# Patient Record
Sex: FEMALE | Race: OTHER | Employment: UNEMPLOYED | URBAN - METROPOLITAN AREA
[De-identification: names, ages, dates, MRNs, and addresses within clinical notes are randomized per-mention and may not be internally consistent; named-entity substitution may affect disease eponyms.]

---

## 2019-05-10 ENCOUNTER — HOSPITAL ENCOUNTER (OUTPATIENT)
Age: 34
Setting detail: SPECIMEN
Discharge: HOME | End: 2019-05-10

## 2019-05-11 LAB
ABSOLUTE EOS #: 0.13 K/UL (ref 0–0.44)
ABSOLUTE IMMATURE GRANULOCYTE: <0.03 K/UL (ref 0–0.3)
ABSOLUTE LYMPH #: 3.38 K/UL (ref 1.1–3.7)
ABSOLUTE MONO #: 0.66 K/UL (ref 0.1–1.2)
ALBUMIN SERPL-MCNC: 4.2 G/DL (ref 3.5–5.2)
ALBUMIN/GLOBULIN RATIO: 1.1 (ref 1–2.5)
ALP BLD-CCNC: 61 U/L (ref 35–104)
ALT SERPL-CCNC: 21 U/L (ref 5–33)
ANION GAP SERPL CALCULATED.3IONS-SCNC: 12 MMOL/L (ref 9–17)
AST SERPL-CCNC: 24 U/L
BASOPHILS # BLD: 1 % (ref 0–2)
BASOPHILS ABSOLUTE: 0.06 K/UL (ref 0–0.2)
BILIRUB SERPL-MCNC: 0.2 MG/DL (ref 0.3–1.2)
BUN BLDV-MCNC: 15 MG/DL (ref 6–20)
BUN/CREAT BLD: ABNORMAL (ref 9–20)
CALCIUM SERPL-MCNC: 9.3 MG/DL (ref 8.6–10.4)
CHLORIDE BLD-SCNC: 100 MMOL/L (ref 98–107)
CHOLESTEROL/HDL RATIO: 4.7
CHOLESTEROL: 178 MG/DL
CO2: 25 MMOL/L (ref 20–31)
CREAT SERPL-MCNC: 0.63 MG/DL (ref 0.5–0.9)
DIFFERENTIAL TYPE: NORMAL
EOSINOPHILS RELATIVE PERCENT: 1 % (ref 1–4)
GFR AFRICAN AMERICAN: >60 ML/MIN
GFR NON-AFRICAN AMERICAN: >60 ML/MIN
GFR SERPL CREATININE-BSD FRML MDRD: ABNORMAL ML/MIN/{1.73_M2}
GFR SERPL CREATININE-BSD FRML MDRD: ABNORMAL ML/MIN/{1.73_M2}
GLUCOSE BLD-MCNC: 88 MG/DL (ref 70–99)
HCT VFR BLD CALC: 41.9 % (ref 36.3–47.1)
HDLC SERPL-MCNC: 38 MG/DL
HEMOGLOBIN: 13.1 G/DL (ref 11.9–15.1)
IMMATURE GRANULOCYTES: 0 %
LDL CHOLESTEROL: 118 MG/DL (ref 0–130)
LYMPHOCYTES # BLD: 31 % (ref 24–43)
MCH RBC QN AUTO: 28.7 PG (ref 25.2–33.5)
MCHC RBC AUTO-ENTMCNC: 31.3 G/DL (ref 28.4–34.8)
MCV RBC AUTO: 91.7 FL (ref 82.6–102.9)
MONOCYTES # BLD: 6 % (ref 3–12)
NRBC AUTOMATED: 0 PER 100 WBC
PDW BLD-RTO: 12.8 % (ref 11.8–14.4)
PLATELET # BLD: 343 K/UL (ref 138–453)
PLATELET ESTIMATE: NORMAL
PMV BLD AUTO: 10.6 FL (ref 8.1–13.5)
POTASSIUM SERPL-SCNC: 4.3 MMOL/L (ref 3.7–5.3)
RBC # BLD: 4.57 M/UL (ref 3.95–5.11)
RBC # BLD: NORMAL 10*6/UL
SEG NEUTROPHILS: 61 % (ref 36–65)
SEGMENTED NEUTROPHILS ABSOLUTE COUNT: 6.56 K/UL (ref 1.5–8.1)
SODIUM BLD-SCNC: 137 MMOL/L (ref 135–144)
TOTAL PROTEIN: 8 G/DL (ref 6.4–8.3)
TRIGL SERPL-MCNC: 111 MG/DL
TSH SERPL DL<=0.05 MIU/L-ACNC: 2.04 MIU/L (ref 0.3–5)
VLDLC SERPL CALC-MCNC: ABNORMAL MG/DL (ref 1–30)
WBC # BLD: 10.8 K/UL (ref 3.5–11.3)
WBC # BLD: NORMAL 10*3/UL

## 2019-05-17 ENCOUNTER — HOSPITAL ENCOUNTER (OUTPATIENT)
Age: 34
Setting detail: SPECIMEN
Discharge: HOME | End: 2019-05-17
Payer: COMMERCIAL

## 2019-05-21 LAB
HPV SAMPLE: NORMAL
HPV, GENOTYPE 16: NOT DETECTED
HPV, GENOTYPE 18: NOT DETECTED
HPV, HIGH RISK OTHER: NOT DETECTED
HPV, INTERPRETATION: NORMAL
SPECIMEN DESCRIPTION: NORMAL

## 2019-05-22 LAB — CYTOLOGY REPORT: NORMAL

## 2019-09-08 ENCOUNTER — HOSPITAL ENCOUNTER (EMERGENCY)
Age: 34
Discharge: HOME | End: 2019-09-08
Attending: EMERGENCY MEDICINE
Payer: COMMERCIAL

## 2019-09-08 VITALS
RESPIRATION RATE: 18 BRPM | OXYGEN SATURATION: 94 % | TEMPERATURE: 103.2 F | SYSTOLIC BLOOD PRESSURE: 119 MMHG | DIASTOLIC BLOOD PRESSURE: 65 MMHG | HEART RATE: 139 BPM

## 2019-09-08 DIAGNOSIS — A09 ACUTE INFECTIVE GASTROENTERITIS: Primary | ICD-10-CM

## 2019-09-08 PROCEDURE — 99203 OFFICE O/P NEW LOW 30 MIN: CPT | Performed by: EMERGENCY MEDICINE

## 2019-09-08 PROCEDURE — 6370000000 HC RX 637 (ALT 250 FOR IP): Performed by: NURSE PRACTITIONER

## 2019-09-08 PROCEDURE — 99204 OFFICE O/P NEW MOD 45 MIN: CPT

## 2019-09-08 RX ORDER — LISINOPRIL 20 MG/1
20 TABLET ORAL 2 TIMES DAILY
COMMUNITY
End: 2020-02-12

## 2019-09-08 RX ORDER — ONDANSETRON 4 MG/1
4 TABLET, ORALLY DISINTEGRATING ORAL 4 TIMES DAILY PRN
Qty: 12 TABLET | Refills: 0 | Status: SHIPPED | OUTPATIENT
Start: 2019-09-08 | End: 2020-02-12 | Stop reason: ALTCHOICE

## 2019-09-08 RX ORDER — LOPERAMIDE HYDROCHLORIDE 2 MG/1
4 CAPSULE ORAL PRN
COMMUNITY
End: 2020-02-12 | Stop reason: ALTCHOICE

## 2019-09-08 RX ORDER — ONDANSETRON 4 MG/1
4 TABLET, ORALLY DISINTEGRATING ORAL ONCE
Status: COMPLETED | OUTPATIENT
Start: 2019-09-08 | End: 2019-09-08

## 2019-09-08 RX ORDER — ACETAMINOPHEN 325 MG/1
650 TABLET ORAL ONCE
Status: COMPLETED | OUTPATIENT
Start: 2019-09-08 | End: 2019-09-08

## 2019-09-08 RX ADMIN — ONDANSETRON 4 MG: 4 TABLET, ORALLY DISINTEGRATING ORAL at 14:33

## 2019-09-08 RX ADMIN — ACETAMINOPHEN 650 MG: 325 TABLET ORAL at 14:48

## 2019-09-08 SDOH — HEALTH STABILITY: MENTAL HEALTH: HOW OFTEN DO YOU HAVE A DRINK CONTAINING ALCOHOL?: NEVER

## 2019-09-08 ASSESSMENT — ENCOUNTER SYMPTOMS
BACK PAIN: 0
ABDOMINAL DISTENTION: 0
ABDOMINAL PAIN: 1
EYE REDNESS: 0
SHORTNESS OF BREATH: 0
COUGH: 0
EYE DISCHARGE: 0
WHEEZING: 0
CONSTIPATION: 0
SINUS PRESSURE: 0
STRIDOR: 0
CHOKING: 0
TROUBLE SWALLOWING: 0
FACIAL SWELLING: 0
DIARRHEA: 1
VOICE CHANGE: 0
BLOOD IN STOOL: 0
SORE THROAT: 0
NAUSEA: 1
VOMITING: 1
EYE PAIN: 0

## 2019-09-08 ASSESSMENT — PAIN DESCRIPTION - ORIENTATION: ORIENTATION: UPPER;MID

## 2019-09-08 ASSESSMENT — PAIN DESCRIPTION - LOCATION: LOCATION: ABDOMEN

## 2019-09-08 ASSESSMENT — PAIN - FUNCTIONAL ASSESSMENT: PAIN_FUNCTIONAL_ASSESSMENT: PREVENTS OR INTERFERES WITH MANY ACTIVE NOT PASSIVE ACTIVITIES

## 2019-09-08 ASSESSMENT — PAIN DESCRIPTION - PAIN TYPE: TYPE: ACUTE PAIN

## 2019-09-08 ASSESSMENT — PAIN DESCRIPTION - PROGRESSION: CLINICAL_PROGRESSION: NOT CHANGED

## 2019-09-08 ASSESSMENT — PAIN SCALES - GENERAL
PAINLEVEL_OUTOF10: 2
PAINLEVEL_OUTOF10: 2

## 2019-09-08 ASSESSMENT — PAIN DESCRIPTION - ONSET: ONSET: SUDDEN

## 2019-09-08 ASSESSMENT — PAIN DESCRIPTION - FREQUENCY: FREQUENCY: INTERMITTENT

## 2019-09-08 ASSESSMENT — PAIN DESCRIPTION - DESCRIPTORS: DESCRIPTORS: DISCOMFORT

## 2019-09-08 NOTE — ED PROVIDER NOTES
erythema or tonsillar abscesses. Eyes: Pupils are equal, round, and reactive to light. Conjunctivae and EOM are normal. Right eye exhibits no discharge. Left eye exhibits no discharge. No scleral icterus. Conjunctiva clear   Neck: Normal range of motion. No JVD present. No thyromegaly present. No meningismus   Cardiovascular: Regular rhythm, S1 normal, S2 normal, normal heart sounds, intact distal pulses and normal pulses. Tachycardia present. Exam reveals no gallop and no friction rub. No murmur heard. Pulmonary/Chest: Effort normal and breath sounds normal. No stridor. No respiratory distress. She has no wheezes. She has no rales. She exhibits no tenderness. No cough, lungs clear   Abdominal: Soft. Bowel sounds are normal. She exhibits no distension and no mass. There is tenderness in the epigastric area. There is no rigidity, no rebound, no guarding and no CVA tenderness. Slight epigastric tenderness. No right lower quadrant tenderness. Bowel sounds present. Musculoskeletal: Normal range of motion. She exhibits no edema or tenderness. Right lower leg: Normal.        Left lower leg: Normal.   Joints normal   Lymphadenopathy:     She has cervical adenopathy. Right cervical: Superficial cervical adenopathy present. No deep cervical and no posterior cervical adenopathy present. Left cervical: Superficial cervical adenopathy present. No deep cervical and no posterior cervical adenopathy present. Neurological: She is alert and oriented to person, place, and time. She has normal reflexes. She displays normal reflexes. No cranial nerve deficit. She exhibits normal muscle tone. Coordination normal.   Appropriate, no focal findings   Skin: Skin is warm and dry. No rash noted. She is not diaphoretic. No erythema. No rash or bruising   Psychiatric: She has a normal mood and affect.  Her behavior is normal. Judgment and thought content normal.   Nursing note and vitals

## 2019-11-18 ENCOUNTER — HOSPITAL ENCOUNTER (EMERGENCY)
Age: 34
Discharge: HOME | End: 2019-11-18
Payer: COMMERCIAL

## 2019-11-18 VITALS
TEMPERATURE: 97.7 F | WEIGHT: 252 LBS | HEIGHT: 64 IN | BODY MASS INDEX: 43.02 KG/M2 | SYSTOLIC BLOOD PRESSURE: 139 MMHG | OXYGEN SATURATION: 100 % | RESPIRATION RATE: 16 BRPM | DIASTOLIC BLOOD PRESSURE: 86 MMHG | HEART RATE: 84 BPM

## 2019-11-18 DIAGNOSIS — G56.01 CARPAL TUNNEL SYNDROME OF RIGHT WRIST: Primary | ICD-10-CM

## 2019-11-18 PROCEDURE — 99213 OFFICE O/P EST LOW 20 MIN: CPT | Performed by: NURSE PRACTITIONER

## 2019-11-18 PROCEDURE — 99212 OFFICE O/P EST SF 10 MIN: CPT

## 2019-11-18 ASSESSMENT — PAIN DESCRIPTION - PROGRESSION: CLINICAL_PROGRESSION: GRADUALLY WORSENING

## 2019-11-18 ASSESSMENT — PAIN DESCRIPTION - LOCATION: LOCATION: HAND

## 2019-11-18 ASSESSMENT — PAIN DESCRIPTION - ONSET: ONSET: GRADUAL

## 2019-11-18 ASSESSMENT — PAIN DESCRIPTION - ORIENTATION: ORIENTATION: RIGHT

## 2019-11-18 ASSESSMENT — PAIN DESCRIPTION - PAIN TYPE: TYPE: ACUTE PAIN

## 2019-11-18 ASSESSMENT — PAIN SCALES - GENERAL: PAINLEVEL_OUTOF10: 5

## 2019-11-18 ASSESSMENT — ENCOUNTER SYMPTOMS
NAUSEA: 0
VOMITING: 0

## 2019-11-18 ASSESSMENT — PAIN - FUNCTIONAL ASSESSMENT: PAIN_FUNCTIONAL_ASSESSMENT: PREVENTS OR INTERFERES SOME ACTIVE ACTIVITIES AND ADLS

## 2019-11-18 ASSESSMENT — PAIN DESCRIPTION - DESCRIPTORS: DESCRIPTORS: ACHING;SORE

## 2019-11-18 ASSESSMENT — PAIN DESCRIPTION - FREQUENCY: FREQUENCY: CONTINUOUS

## 2020-01-29 ENCOUNTER — HOSPITAL ENCOUNTER (EMERGENCY)
Age: 35
Discharge: HOME | End: 2020-01-29
Attending: EMERGENCY MEDICINE
Payer: COMMERCIAL

## 2020-01-29 ENCOUNTER — APPOINTMENT (OUTPATIENT)
Dept: GENERAL RADIOLOGY | Age: 35
End: 2020-01-29
Payer: COMMERCIAL

## 2020-01-29 VITALS
DIASTOLIC BLOOD PRESSURE: 74 MMHG | OXYGEN SATURATION: 97 % | TEMPERATURE: 99.2 F | WEIGHT: 280 LBS | HEART RATE: 86 BPM | HEIGHT: 64 IN | SYSTOLIC BLOOD PRESSURE: 151 MMHG | RESPIRATION RATE: 16 BRPM | BODY MASS INDEX: 47.8 KG/M2

## 2020-01-29 LAB
ANION GAP SERPL CALCULATED.3IONS-SCNC: 12 MEQ/L (ref 8–16)
BASOPHILS # BLD: 0.4 %
BASOPHILS ABSOLUTE: 0 THOU/MM3 (ref 0–0.1)
BUN BLDV-MCNC: 16 MG/DL (ref 7–22)
CALCIUM SERPL-MCNC: 8.9 MG/DL (ref 8.5–10.5)
CHLORIDE BLD-SCNC: 100 MEQ/L (ref 98–111)
CO2: 26 MEQ/L (ref 23–33)
CREAT SERPL-MCNC: 0.8 MG/DL (ref 0.4–1.2)
EKG ATRIAL RATE: 95 BPM
EKG P AXIS: 51 DEGREES
EKG P-R INTERVAL: 136 MS
EKG Q-T INTERVAL: 342 MS
EKG QRS DURATION: 80 MS
EKG QTC CALCULATION (BAZETT): 429 MS
EKG R AXIS: 30 DEGREES
EKG T AXIS: 4 DEGREES
EKG VENTRICULAR RATE: 95 BPM
EOSINOPHIL # BLD: 1 %
EOSINOPHILS ABSOLUTE: 0.1 THOU/MM3 (ref 0–0.4)
ERYTHROCYTE [DISTWIDTH] IN BLOOD BY AUTOMATED COUNT: 12.9 % (ref 11.5–14.5)
ERYTHROCYTE [DISTWIDTH] IN BLOOD BY AUTOMATED COUNT: 41.9 FL (ref 35–45)
GFR SERPL CREATININE-BSD FRML MDRD: 81 ML/MIN/1.73M2
GLUCOSE BLD-MCNC: 149 MG/DL (ref 70–108)
HCT VFR BLD CALC: 41.2 % (ref 37–47)
HEMOGLOBIN: 13.2 GM/DL (ref 12–16)
IMMATURE GRANS (ABS): 0.04 THOU/MM3 (ref 0–0.07)
IMMATURE GRANULOCYTES: 0.3 %
LYMPHOCYTES # BLD: 21.5 %
LYMPHOCYTES ABSOLUTE: 2.6 THOU/MM3 (ref 1–4.8)
MCH RBC QN AUTO: 28.5 PG (ref 26–33)
MCHC RBC AUTO-ENTMCNC: 32 GM/DL (ref 32.2–35.5)
MCV RBC AUTO: 89 FL (ref 81–99)
MONOCYTES # BLD: 3.9 %
MONOCYTES ABSOLUTE: 0.5 THOU/MM3 (ref 0.4–1.3)
NUCLEATED RED BLOOD CELLS: 0 /100 WBC
OSMOLALITY CALCULATION: 279.7 MOSMOL/KG (ref 275–300)
PLATELET # BLD: 316 THOU/MM3 (ref 130–400)
PMV BLD AUTO: 9.7 FL (ref 9.4–12.4)
POTASSIUM REFLEX MAGNESIUM: 3.8 MEQ/L (ref 3.5–5.2)
PREGNANCY, SERUM: NEGATIVE
PRO-BNP: 195.8 PG/ML (ref 0–450)
RBC # BLD: 4.63 MILL/MM3 (ref 4.2–5.4)
SEG NEUTROPHILS: 72.9 %
SEGMENTED NEUTROPHILS ABSOLUTE COUNT: 8.7 THOU/MM3 (ref 1.8–7.7)
SODIUM BLD-SCNC: 138 MEQ/L (ref 135–145)
TROPONIN T: < 0.01 NG/ML
TROPONIN T: < 0.01 NG/ML
WBC # BLD: 11.9 THOU/MM3 (ref 4.8–10.8)

## 2020-01-29 PROCEDURE — 71046 X-RAY EXAM CHEST 2 VIEWS: CPT

## 2020-01-29 PROCEDURE — 6370000000 HC RX 637 (ALT 250 FOR IP): Performed by: EMERGENCY MEDICINE

## 2020-01-29 PROCEDURE — 85025 COMPLETE CBC W/AUTO DIFF WBC: CPT

## 2020-01-29 PROCEDURE — 84703 CHORIONIC GONADOTROPIN ASSAY: CPT

## 2020-01-29 PROCEDURE — 84484 ASSAY OF TROPONIN QUANT: CPT

## 2020-01-29 PROCEDURE — 83880 ASSAY OF NATRIURETIC PEPTIDE: CPT

## 2020-01-29 PROCEDURE — 80048 BASIC METABOLIC PNL TOTAL CA: CPT

## 2020-01-29 PROCEDURE — 93005 ELECTROCARDIOGRAM TRACING: CPT | Performed by: EMERGENCY MEDICINE

## 2020-01-29 PROCEDURE — 36415 COLL VENOUS BLD VENIPUNCTURE: CPT

## 2020-01-29 PROCEDURE — 93010 ELECTROCARDIOGRAM REPORT: CPT | Performed by: NUCLEAR MEDICINE

## 2020-01-29 PROCEDURE — 99285 EMERGENCY DEPT VISIT HI MDM: CPT

## 2020-01-29 RX ORDER — ASPIRIN 81 MG/1
324 TABLET, CHEWABLE ORAL ONCE
Status: DISCONTINUED | OUTPATIENT
Start: 2020-01-29 | End: 2020-01-29 | Stop reason: HOSPADM

## 2020-01-29 RX ORDER — MECLIZINE HYDROCHLORIDE CHEWABLE TABLETS 25 MG/1
25 TABLET, CHEWABLE ORAL ONCE
Status: COMPLETED | OUTPATIENT
Start: 2020-01-29 | End: 2020-01-29

## 2020-01-29 RX ADMIN — MECLIZINE HCL 25 MG: 25 TABLET, CHEWABLE ORAL at 16:40

## 2020-01-29 ASSESSMENT — ENCOUNTER SYMPTOMS
BLOOD IN STOOL: 0
NAUSEA: 0
COUGH: 0
ABDOMINAL PAIN: 0
SINUS PAIN: 0
DIARRHEA: 0
CHEST TIGHTNESS: 1
SINUS PRESSURE: 0
CONSTIPATION: 0
BACK PAIN: 0
SHORTNESS OF BREATH: 0
RECTAL PAIN: 0
EYE PAIN: 0

## 2020-01-29 NOTE — ED PROVIDER NOTES
[facility] [address]        CHIEF COMPLAINT       Chief Complaint   Patient presents with    Chest Pain     History obtained from chart review, the patient and patient's . HISTORY OF PRESENT ILLNESS    HPI  Tommy Begum is a 37 y.o. female who presents to the emergency department for evaluation of dizziness, chest pain, lightheadedness. Dizziness has been intermittent, with spinning sensation, for the last week and a half, not present at this moment. Patient states today while at work walking she started feeling lightheaded, had to sit down, became pale and diaphoretic, with palpitations described fast heartbeats. Symptoms recovered spontaneously and were followed by mild pressure type left-sided chest pain that improved before EMS arrival.  Upon EMS arrival she was given nitroglycerin and was brought into the hospital.  Patient has minimal chest pain at this moment. States that she is supposed to take lisinopril but has not been taking it. The patient has no other acute complaints at this time. REVIEW OF SYSTEMS   Review of Systems   Constitutional: Negative for chills, fever and unexpected weight change. HENT: Negative for congestion, ear pain, nosebleeds, sinus pressure and sinus pain. Eyes: Negative for pain. Respiratory: Positive for chest tightness. Negative for cough and shortness of breath. Cardiovascular: Negative for chest pain. Gastrointestinal: Negative for abdominal pain, blood in stool, constipation, diarrhea, nausea and rectal pain. Endocrine: Negative for polydipsia and polyuria. Genitourinary: Negative for dysuria and flank pain. Musculoskeletal: Negative for arthralgias, back pain, myalgias and neck pain. Skin: Negative for rash. Neurological: Positive for light-headedness (No longer present). Negative for tremors, seizures, weakness and headaches.    All other systems reviewed and are atraumatic. Right Ear: External ear normal.      Left Ear: External ear normal.      Nose: Nose normal.   Eyes:      Conjunctiva/sclera: Conjunctivae normal.      Pupils: Pupils are equal, round, and reactive to light. Neck:      Musculoskeletal: Neck supple. Vascular: No JVD. Cardiovascular:      Rate and Rhythm: Normal rate and regular rhythm. Heart sounds: Normal heart sounds. No murmur. No friction rub. No gallop. Pulmonary:      Effort: Pulmonary effort is normal.      Breath sounds: Normal breath sounds. No stridor. No wheezing or rales. Skin:     General: Skin is warm and dry. Neurological:      Mental Status: She is alert and oriented to person, place, and time. Psychiatric:         Behavior: Behavior normal.             MEDICAL DECISION MAKING   Initial Assessment: Uncontrolled hypertension, noncompliant with medications. Near syncopal episode with chest pain. Skilled on importance of taking her hypertension medication. Plan: IV line, labs, medication, EKG, chest x-ray, observation.         ED RESULTS     Labs Reviewed   CBC WITH AUTO DIFFERENTIAL - Abnormal; Notable for the following components:       Result Value    WBC 11.9 (*)     MCHC 32.0 (*)     Segs Absolute 8.7 (*)     All other components within normal limits   BASIC METABOLIC PANEL W/ REFLEX TO MG FOR LOW K - Abnormal; Notable for the following components:    Glucose 149 (*)     All other components within normal limits   GLOMERULAR FILTRATION RATE, ESTIMATED - Abnormal; Notable for the following components:    Est, Glom Filt Rate 81 (*)     All other components within normal limits   TROPONIN   BRAIN NATRIURETIC PEPTIDE   HCG, SERUM, QUALITATIVE   ANION GAP   OSMOLALITY   TROPONIN         Medications   aspirin chewable tablet 324 mg (324 mg Oral Not Given 1/29/20 1521)   meclizine (ANTIVERT) chewable tablet 25 mg (25 mg Oral Given 1/29/20 1640)         XR CHEST STANDARD (2 VW)   Final Result      No acute cardiopulmonary disease. **This report has been created using voice recognition software. It may contain minor errors which are inherent in voice recognition technology. **      Final report electronically signed by Dr. Robert Parsons on 1/29/2020 3:53 PM              MEDICATION CHANGES     New Prescriptions    No medications on file         FINAL DISPOSITION     Final diagnoses:   Near syncope   Chest pain, unspecified type     Condition: condition: good  Dispo: Transfer care to Dr. Caridad Barnett, pending repeat EKG and repeat troponin for possible discharge with referral to primary care. This transcription was electronically signed. It was dictated by use of voice recognition software and electronically transcribed. The transcription may contain errors not detected in proofreading.         Radha Styles MD  01/29/20 3083

## 2020-01-29 NOTE — ED NOTES
Upon first contact with patient this RN receives bedside shift report from [facility]. Pt resting in bed with family at bedside. Pt denies pain or other complaints at this time. Cardiac monitoring active at this time showing NSR. No signs of distress. Will continue to monitor.      Bryant Dunlap RN  01/29/20 7272

## 2020-01-29 NOTE — ED TRIAGE NOTES
Primarily [place] speaking pt presents to room 35 per [facility] from private residence. EMS was called to residence for palpitations. Pt began experiencing chest pain en route to this facility. Chest pain was relieved by administration of NTG x 2 and four baby ASA. At present time, pt is alert and oriented. Respirations even and unlabored; skin warm & dry. NAD noted.

## 2020-01-29 NOTE — ED PROVIDER NOTES
Pt Name: Caremlita Mcmanus  MRN: [id number]  [place] 1985  Date of evaluation: 1/29/20         Carmelita Mcmanus is a 37 y.o. female who presents with Chest Pain            The patient was seen by me in the emergency room She was seen and manage initially by Dr Leeann Camejo. Patient was signed out to me by Dr. Leeann Camejo as patient had the pending laboratory results. She has been asymptomatic since his been here in the emergency room. Vitals:    Vitals:    01/29/20 1458 01/29/20 1615 01/29/20 1727 01/29/20 1729   BP: (!) 170/81 (!) 155/84 (!) 151/74    Pulse: 91 82 86 86   Resp: 16 21 16    Temp: 99.2 °F (37.3 °C)      TempSrc: Oral      SpO2: 99% 98% 97%    Weight: 280 lb (127 kg)      Height: 5' 4\" (1.626 m)          EMERGENCY DEPARTMENT COURSE:    Medications   aspirin chewable tablet 324 mg (324 mg Oral Not Given 1/29/20 1521)   meclizine (ANTIVERT) chewable tablet 25 mg (25 mg Oral Given 1/29/20 1640)       FINAL IMPRESSION       1. Near syncope    2. Chest pain, unspecified type    3. Palpitations          DISPOSITION/PLAN  PATIENT REFERRED TO:  [address] [address],[address]  [facility]. [address] [facility] [address] [place] [address]  Schedule an appointment as soon as possible for a visit in 2 days      [facility] EMERGENCY DEPT  [address]    As needed    DISCHARGEMEDICATIONS:  New Prescriptions    No medications on file       (Please note that portions of this note were completed with a voice recognition program.  Efforts were [facility] edit the dictations but occasionally words are mis-transcribed.)       01/29/20 6:15 PM      Rose Mary Davis MD     Attending Emergency Medicine Physician       Margo Polk MD  01/29/20 2837

## 2020-01-29 NOTE — LETTER
[facility] [address] [address] EMERGENCY DEPT  [address] [place] [address]  Phone: [phone]             January 29, 2020    Patient: Ja Villa   YOB: 1985   Date of Visit: 1/29/2020       To Whom It May Concern:    Ja Villa was seen and treated in our emergency department on 1/29/2020. She may return to work on 1/31/2020.       Sincerely,             Signature:__________________________________

## 2020-02-12 ENCOUNTER — NURSE ONLY (OUTPATIENT)
Dept: LAB | Age: 35
End: 2020-02-12

## 2020-02-12 ENCOUNTER — OFFICE VISIT (OUTPATIENT)
Dept: FAMILY MEDICINE CLINIC | Age: 35
End: 2020-02-12
Payer: COMMERCIAL

## 2020-02-12 VITALS
HEART RATE: 76 BPM | BODY MASS INDEX: 50.68 KG/M2 | OXYGEN SATURATION: 100 % | WEIGHT: 286 LBS | SYSTOLIC BLOOD PRESSURE: 152 MMHG | DIASTOLIC BLOOD PRESSURE: 88 MMHG | HEIGHT: 63 IN | TEMPERATURE: 97.8 F

## 2020-02-12 PROBLEM — E66.813 CLASS 3 SEVERE OBESITY DUE TO EXCESS CALORIES WITH BODY MASS INDEX (BMI) OF 45.0 TO 49.9 IN ADULT: Status: ACTIVE | Noted: 2020-02-12

## 2020-02-12 PROBLEM — I10 ESSENTIAL HYPERTENSION: Status: ACTIVE | Noted: 2020-02-12

## 2020-02-12 PROBLEM — N63.0 BREAST LUMP: Status: ACTIVE | Noted: 2019-05-03

## 2020-02-12 PROBLEM — E66.01 CLASS 3 SEVERE OBESITY DUE TO EXCESS CALORIES WITH BODY MASS INDEX (BMI) OF 45.0 TO 49.9 IN ADULT (HCC): Status: ACTIVE | Noted: 2020-02-12

## 2020-02-12 PROBLEM — R73.03 PREDIABETES: Status: ACTIVE | Noted: 2020-02-12

## 2020-02-12 LAB
BILIRUBIN URINE: NEGATIVE
BLOOD URINE, POC: NEGATIVE
CHARACTER, URINE: CLEAR
CHOLESTEROL, TOTAL: 202 MG/DL (ref 100–199)
COLOR, URINE: YELLOW
ERYTHROCYTE [DISTWIDTH] IN BLOOD BY AUTOMATED COUNT: 13.1 % (ref 11.5–14.5)
ERYTHROCYTE [DISTWIDTH] IN BLOOD BY AUTOMATED COUNT: 43.2 FL (ref 35–45)
GLUCOSE URINE: NEGATIVE MG/DL
HBA1C MFR BLD: 6.1 % (ref 4.3–5.7)
HCT VFR BLD CALC: 44.7 % (ref 37–47)
HDLC SERPL-MCNC: 42 MG/DL
HEMOGLOBIN: 14.1 GM/DL (ref 12–16)
KETONES, URINE: NEGATIVE
LDL CHOLESTEROL CALCULATED: 127 MG/DL
LEUKOCYTE CLUMPS, URINE: NEGATIVE
MCH RBC QN AUTO: 28.8 PG (ref 26–33)
MCHC RBC AUTO-ENTMCNC: 31.5 GM/DL (ref 32.2–35.5)
MCV RBC AUTO: 91.2 FL (ref 81–99)
NITRITE, URINE: NEGATIVE
PH, URINE: 5 (ref 5–9)
PLATELET # BLD: 325 THOU/MM3 (ref 130–400)
PMV BLD AUTO: 10.2 FL (ref 9.4–12.4)
PROTEIN, URINE: NEGATIVE MG/DL
RBC # BLD: 4.9 MILL/MM3 (ref 4.2–5.4)
SPECIFIC GRAVITY, URINE: >= 1.03 (ref 1–1.03)
TRIGL SERPL-MCNC: 166 MG/DL (ref 0–199)
TSH SERPL DL<=0.05 MIU/L-ACNC: 1.32 UIU/ML (ref 0.4–4.2)
UROBILINOGEN, URINE: 0.2 EU/DL (ref 0–1)
WBC # BLD: 11.1 THOU/MM3 (ref 4.8–10.8)

## 2020-02-12 PROCEDURE — 99203 OFFICE O/P NEW LOW 30 MIN: CPT | Performed by: STUDENT IN AN ORGANIZED HEALTH CARE EDUCATION/TRAINING PROGRAM

## 2020-02-12 PROCEDURE — 83036 HEMOGLOBIN GLYCOSYLATED A1C: CPT | Performed by: STUDENT IN AN ORGANIZED HEALTH CARE EDUCATION/TRAINING PROGRAM

## 2020-02-12 RX ORDER — AMLODIPINE BESYLATE 5 MG/1
5 TABLET ORAL DAILY
Qty: 90 TABLET | Refills: 1 | Status: SHIPPED | OUTPATIENT
Start: 2020-02-12 | End: 2020-07-16

## 2020-02-12 SDOH — ECONOMIC STABILITY: TRANSPORTATION INSECURITY
IN THE PAST 12 MONTHS, HAS THE LACK OF TRANSPORTATION KEPT YOU FROM MEDICAL APPOINTMENTS OR FROM GETTING MEDICATIONS?: NO

## 2020-02-12 SDOH — ECONOMIC STABILITY: FOOD INSECURITY: WITHIN THE PAST 12 MONTHS, THE FOOD YOU BOUGHT JUST DIDN'T LAST AND YOU DIDN'T HAVE MONEY TO GET MORE.: NEVER TRUE

## 2020-02-12 SDOH — ECONOMIC STABILITY: TRANSPORTATION INSECURITY
IN THE PAST 12 MONTHS, HAS LACK OF TRANSPORTATION KEPT YOU FROM MEETINGS, WORK, OR FROM GETTING THINGS NEEDED FOR DAILY LIVING?: NO

## 2020-02-12 SDOH — ECONOMIC STABILITY: FOOD INSECURITY: WITHIN THE PAST 12 MONTHS, YOU WORRIED THAT YOUR FOOD WOULD RUN OUT BEFORE YOU GOT MONEY TO BUY MORE.: NEVER TRUE

## 2020-02-12 SDOH — ECONOMIC STABILITY: INCOME INSECURITY: HOW HARD IS IT FOR YOU TO PAY FOR THE VERY BASICS LIKE FOOD, HOUSING, MEDICAL CARE, AND HEATING?: NOT HARD AT ALL

## 2020-02-12 ASSESSMENT — ENCOUNTER SYMPTOMS
COUGH: 0
ABDOMINAL PAIN: 0
BLOOD IN STOOL: 0
EYE PAIN: 0
BACK PAIN: 1
SHORTNESS OF BREATH: 0
DIARRHEA: 0
TROUBLE SWALLOWING: 0
CONSTIPATION: 0

## 2020-02-12 ASSESSMENT — PATIENT HEALTH QUESTIONNAIRE - PHQ9
SUM OF ALL RESPONSES TO PHQ9 QUESTIONS 1 & 2: 0
2. FEELING DOWN, DEPRESSED OR HOPELESS: 0
SUM OF ALL RESPONSES TO PHQ QUESTIONS 1-9: 0
1. LITTLE INTEREST OR PLEASURE IN DOING THINGS: 0
SUM OF ALL RESPONSES TO PHQ QUESTIONS 1-9: 0

## 2020-02-12 NOTE — PROGRESS NOTES
[address] [facility] [address]  Dept: [phone]  Dept Fax: [phone]  Loc: [phone]      Livia Olvera is a 37 y.o. female who presents todayfor her medical conditions/complaints as noted below. Livia Olvera is c/o of Established New Doctor; ED Follow-up; Blood Pressure Check (has machine at home); Nutrition Counseling (was on keto diet then madfatigue ); and Back Pain (yesterday )      :   Translation line: [id number] used     Anil Wells is a  37  with newly diagnosed HTN. She was recently in the ED for chest pain, palpitations. EKG was WNL. She was sent home with lisinopril 20 mg BID. Reports that her home BP readings remain elevated at 135-145/80-90s. Denies headaches, changes in vision. Desires more kids. Is not on birth control. New onset of low back pain yesterday after spinning around to catch her son. Denies radiation. Pain is sharp and intermittent. Right now it is not painful. Certain positions make it worse. No decrease in range of motion. Denies hematuria and dysuria. Periods q 42 days with 3-4 days of bleeding. She has a 2 yo son. 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 She is doing the ketogenic diet. Started January and has lost 10 lbs. Is not exercising but is active at job. There is no problem list on file for this patient. The patient has No Known Allergies. Medical History  Anil Wells has a past medical history of Hypertension. Past SurgicalHistory  The patient  has a past surgical history that includes  section (). Family History  This patient's family history includes Breast Cancer in her mother; Diabetes in her mother; Kidney Disease in her father. Social History  Anil Wells  reports that she has never smoked.  She has never used smokeless tobacco. She reports that she does not drink alcohol or use drugs.    Medications    Current Outpatient Medications:     MAGNESIUM PO, Take 400 mg by mouth, Disp: , Rfl:     Multiple Vitamin (MULTI-VITAMINS PO), Take by mouth, Disp: , Rfl:     amLODIPine (NORVASC) 5 MG tablet, Take 1 tablet by mouth daily, Disp: 90 tablet, Rfl: 1    Subjective:      Review of Systems   Constitutional: Negative for chills, fatigue and fever. HENT: Negative for congestion, ear pain, postnasal drip and trouble swallowing. Eyes: Negative for pain and visual disturbance. Respiratory: Negative for cough and shortness of breath. Cardiovascular: Negative for chest pain and palpitations. Gastrointestinal: Negative for abdominal pain, blood in stool, constipation and diarrhea. Genitourinary: Negative for dysuria and hematuria. Musculoskeletal: Positive for back pain. Skin: Negative for rash and wound. Neurological: Negative for dizziness and headaches. Psychiatric/Behavioral: The patient is not nervous/anxious. Objective:     Vitals:    02/12/20 1222 02/12/20 1234   BP: (!) 150/78 (!) 152/88   Pulse: 76    Temp: 97.8 °F (36.6 °C)    TempSrc: Oral    SpO2: 100%    Weight: 286 lb (129.7 kg)    Height: 5' 2.99\" (1.6 m)        Physical Exam  Vitals signs and nursing note reviewed. Constitutional:       General: She is not in acute distress. Appearance: She is well-developed. She is obese. She is not ill-appearing or diaphoretic. HENT:      Head: Normocephalic and atraumatic. Right Ear: Tympanic membrane and external ear normal.      Left Ear: Tympanic membrane and external ear normal.      Nose: Nose normal.      Mouth/Throat:      Mouth: Mucous membranes are moist.   Eyes:      General: No scleral icterus. Right eye: No discharge. Left eye: No discharge. Conjunctiva/sclera: Conjunctivae normal.   Neck:      Musculoskeletal: Normal range of motion. Cardiovascular:      Rate and Rhythm: Normal rate and regular rhythm.       Heart sounds: Normal heart sounds. No murmur. Pulmonary:      Effort: Pulmonary effort is normal.      Breath sounds: Normal breath sounds. Abdominal:      Palpations: Abdomen is soft. Musculoskeletal: Normal range of motion. General: Tenderness (tenderness with palpation of Left SI joint and gluteus muscles ) present. No swelling or deformity. Skin:     General: Skin is warm and dry. Findings: No erythema or rash. Neurological:      Mental Status: She is alert and oriented to person, place, and time. Psychiatric:         Behavior: Behavior normal.         Thought Content: Thought content normal.         Judgment: Judgment normal.         Lab Results   Component Value Date    WBC 11.9 (H) 01/29/2020    HGB 13.2 01/29/2020    HCT 41.2 01/29/2020     01/29/2020    CHOL 178 05/10/2019    TRIG 111 05/10/2019    HDL 38 (L) 05/10/2019    ALT 21 05/10/2019    AST 24 05/10/2019     01/29/2020    K 3.8 01/29/2020     01/29/2020    CREATININE 0.8 01/29/2020    BUN 16 01/29/2020    CO2 26 01/29/2020    TSH 2.04 05/10/2019    LABA1C 6.1 (H) 02/12/2020       Grace Bliss:   1. Essential hypertension  - Continue to monitor BP at home and write down numbers  - Switch from lisinopril to norvasc due to poor control and risk of teratogenicity  - Counseled her to take 2 if BP >150/90   - amLODIPine (NORVASC) 5 MG tablet; Take 1 tablet by mouth daily  Dispense: 90 tablet; Refill: 1  - Urinalysis; Future  - Lipid Panel    2. Class 3 severe obesity due to excess calories with body mass index (BMI) of 45.0 to 49.9 in adult, unspecified whether serious comorbidity present ([address]  - Encouraged her to continue keto diet, and maybe add intermittent fasting (she stated she feels dizzy if she doesn't eat and it resolves with food)   - POCT glycosylated hemoglobin (Hb A1C) - 6.1 today   - TSH With Reflex Ft4; Future   - Lipid Panel    3. Encounter for HIV (human immunodeficiency virus) test  - HIV Screen; Future    4.  Other elevated white blood cell (WBC) count  Leukocytosis in ED two weeks ago without signs of infxn; will repeat to make sure resolved   - CBC; Future    5. Low back pain  - Exercises, heat/ice, ibuprofen/tylenol for pain relief  - expect to resolve <6 weeks     6. Prediabetes  a1c today 6.1; will discuss more in depth at follow up visit       Return in about 2 weeks (around 2/26/2020) for HTN followup . Orders Placed   Orders Placed This Encounter   Procedures    CBC     Standing Status:   Future     Standing Expiration Date:   2/12/2021    HIV Screen     Standing Status:   Future     Standing Expiration Date:   2/12/2021    TSH With Reflex Ft4     Standing Status:   Future     Standing Expiration Date:   2/12/2021    Urinalysis     Standing Status:   Future     Standing Expiration Date:   2/12/2021    Lipid Panel     Order Specific Question:   Is Patient Fasting?/# of Hours     Answer:   no    POCT glycosylated hemoglobin (Hb A1C)    POCT Urinalysis No Micro (Auto)       Prescriptions given/sent  Orders Placed This Encounter   Medications    amLODIPine (NORVASC) 5 MG tablet     Sig: Take 1 tablet by mouth daily     Dispense:  90 tablet     Refill:  1       Patient given educational materials - see patient instructions. Discussed use, benefit, and side effects of prescribed medications. All patientquestions answered. Pt voiced understanding. Reviewed health maintenance.               Electronically signed by Saji Woodruff DO on 2/12/2020 at 1:30 PM

## 2020-02-12 NOTE — PATIENT INSTRUCTIONS
es 90 o superior, o ambas cosas.     · Kacie que podría estar teniendo efectos secundarios de los medicamentos para la presión arterial.     · Santos presión arterial suele ser normal, yarelis se eleva por encima de lo normal en al menos 2 ocasiones. ¿Dónde puede encontrar más información en inglés? Earlene Rideau a [email or web address] e ingrese a santos cuenta de MyChart. Irl Ripple en el Marjo Lowe \"Search Health Information\" para más información (en inglés) sobre \"Presión arterial cindy: Instrucciones de cuidado - [ High Blood Pressure: Care Instructions ]. \"     Si no tiene andry cuenta, dotty elder en el enlace \"Sign Up Now\". Revisado: 9 abril, 2019  Versión del contenido: 12.3  © 9099-0874 [employer]. Las instrucciones de cuidado fueron adaptadas bajo licencia por [facility] ([facility]). Si usted tiene [place] afección médica o sobre estas instrucciones, siempre pregunte a santos profesional de allyssa. Car [place], [employer] niega toda garantía o responsabilidad por santos uso de esta información.

## 2020-02-12 NOTE — PROGRESS NOTES
Health Maintenance Due   Topic Date Due    Varicella vaccine (1 of 2 - 2-dose childhood series) 10/27/1984    DTaP/Tdap/Td vaccine (1 - Tdap) 10/27/1994    HIV screen  10/27/1998    Flu vaccine (1) 09/01/2019

## 2020-02-15 LAB — HIV-2 AB: NEGATIVE

## 2020-07-16 ENCOUNTER — OFFICE VISIT (OUTPATIENT)
Dept: FAMILY MEDICINE CLINIC | Age: 35
End: 2020-07-16
Payer: COMMERCIAL

## 2020-07-16 VITALS
BODY MASS INDEX: 51.91 KG/M2 | DIASTOLIC BLOOD PRESSURE: 82 MMHG | HEIGHT: 63 IN | OXYGEN SATURATION: 98 % | HEART RATE: 82 BPM | TEMPERATURE: 98.2 F | WEIGHT: 293 LBS | RESPIRATION RATE: 20 BRPM | SYSTOLIC BLOOD PRESSURE: 130 MMHG

## 2020-07-16 LAB — HBA1C MFR BLD: 6.4 % (ref 4.3–5.7)

## 2020-07-16 PROCEDURE — 99214 OFFICE O/P EST MOD 30 MIN: CPT | Performed by: STUDENT IN AN ORGANIZED HEALTH CARE EDUCATION/TRAINING PROGRAM

## 2020-07-16 PROCEDURE — 90471 IMMUNIZATION ADMIN: CPT | Performed by: STUDENT IN AN ORGANIZED HEALTH CARE EDUCATION/TRAINING PROGRAM

## 2020-07-16 PROCEDURE — 83036 HEMOGLOBIN GLYCOSYLATED A1C: CPT | Performed by: STUDENT IN AN ORGANIZED HEALTH CARE EDUCATION/TRAINING PROGRAM

## 2020-07-16 PROCEDURE — 90715 TDAP VACCINE 7 YRS/> IM: CPT | Performed by: STUDENT IN AN ORGANIZED HEALTH CARE EDUCATION/TRAINING PROGRAM

## 2020-07-16 RX ORDER — AMLODIPINE BESYLATE 10 MG/1
10 TABLET ORAL DAILY
Qty: 30 TABLET | Refills: 0 | Status: SHIPPED | OUTPATIENT
Start: 2020-07-16 | End: 2020-09-08 | Stop reason: SDUPTHER

## 2020-07-16 RX ORDER — NORGESTIMATE AND ETHINYL ESTRADIOL 0.25-0.035
1 KIT ORAL DAILY
Qty: 1 PACKET | Refills: 3 | Status: SHIPPED | OUTPATIENT
Start: 2020-07-16 | End: 2020-12-04 | Stop reason: ALTCHOICE

## 2020-07-16 ASSESSMENT — ENCOUNTER SYMPTOMS
BLOOD IN STOOL: 0
CONSTIPATION: 0
EYE PAIN: 0
COUGH: 0
TROUBLE SWALLOWING: 0
ABDOMINAL PAIN: 0
SHORTNESS OF BREATH: 0
DIARRHEA: 0

## 2020-07-16 NOTE — PROGRESS NOTES
After obtaining consent, and per orders of Dr. Lamas Doctor, injection of Tdap (Boostrix) was given IM in Left deltoid by Christopher Grajeda. Patient tolerated well and was instructed to report any adverse reaction to me immediately. Patient left office with no apparent reaction.     Immunizations Administered     Name Date Dose Route    Tdap (Boostrix, Adacel) 7/16/2020 0.5 mL Intramuscular    Site: Deltoid- Left    Lot:     NDC: 53518-251-79

## 2020-07-16 NOTE — PATIENT INSTRUCTIONS
Thank you   1. Thank you for trusting us with your healthcare needs. You may receive a survey regarding today's visit. It would help us out if you would take a few moments to provide your feedback. We value your input. 2. Please bring in ALL medications BOTTLES, including inhalers, herbal supplements, over the counter, prescribed & non-prescribed medicine. The office would like actual medication bottles and a list.   3. Please note our OFFICE POLICIES:   a. Prior to getting your labs drawn, please check with your insurance company for benefits and eligibility of lab services. Often, insurance companies cover certain tests for preventative visits only. It is patient's responsibility to see what is covered. b. We are unable to change a diagnosis after the test has been performed. c. Lab orders will not be re-printed. Please hold onto your original lab orders and take them to your lab to be completed. d. If you no show your scheduled appointment three times, you will be dismissed from this practice. e. Rosezella To must be completed 24 hours prior to your schedule appointment. 4. If the list below has been completed, PLEASE FAX RECORDS TO OUR OFFICE @ [phone]. Once the records have been received we will update your records at our office:  Health Maintenance Due   Topic Date Due    Varicella vaccine (1 of 2 - 2-dose childhood series) 10/27/1984    DTaP/Tdap/Td vaccine (1 - Tdap) 10/27/2002     Patient Education        Dieta DASH: Instrucciones de cuidado  DASH Diet: Care Instructions  Instrucciones de cuidado    La dieta DASH es un plan de alimentación que puede ayudar a bajar la presión arterial. DASH es la sigla en inglés de \"Dietary Approaches to Stop Hypertension\" (medidas dietéticas para disminuir la hipertensión). Hipertensión significa presión arterial cindy. La dieta DASH se concentra en el consumo de alimentos con alto contenido de calcio, potasio y [place].  Estos nutrientes pueden disminuir la presión arterial. Los alimentos con el mayor contenido de [place] nutrientes son las frutas, las verduras, los productos lácteos de bajo contenido de [place], las nueces, las semillas y las legumbres. Beth torey suplementos de calcio, potasio y magnesio, en lugar de comer alimentos ricos en estos nutrientes, no tiene el mismo efecto. La dieta DASH también incluye granos integrales, pescado y aves. La dieta DASH es annabel de los cambios de estilo de luisito que quizá le recomiende santos médico para reducir la presión arterial cindy. Es posible que santos médico también quiera que usted reduzca la cantidad de sodio en santos Emogene Duncans. Reducir el sodio mientras sigue la dieta DASH puede bajar la presión arterial incluso más que únicamente con la dieta DASH. La atención de seguimiento es andry parte clave de santos tratamiento y seguridad. Asegúrese de hacer y acudir a todas las citas, y llame a santos médico si está teniendo problemas. También es andry buena idea saber los resultados de amor exámenes y mantener andry lista de los medicamentos que roselia. ¿Cómo puede cuidarse en el hogar? Cómo seguir la dieta DASH  · Coma entre 4 y 5 porciones de fruta al día. Andry porción incluye 1 fruta de [place], ½ taza de fruta enlatada o cortada en trozos, 1/4 taza de fruta seca o 4 onzas (½ taza) de jugo de frutas. Elija fruta con más frecuencia que jugo. · Consuma entre 4 y 11 porciones de verduras al día. Andry porción incluye 1 taza de [place] o de verduras de hojas crudas, ½ taza de verduras cocidas o cortadas en trozos, o 4 onzas (½ taza) de jugo de verduras. Elija comer verduras con más frecuencia que torey santos jugo. · Consuma entre 2 y 3 porciones de lácteos semidescremados y descremados al día. Andry porción incluye 8 onzas de [place], 1 taza de yogur o 1½ onzas de [place]. · Coma entre 6 y 6 porciones de granos todos los [address] Andry porción incluye 1 rebanada de pan, 1 onza de cereal seco, o ½ taza de arroz, pasta o cereal cocido.  Trate de elegir productos de grano integral con la mayor frecuencia posible. · Limite la cantidad de [place]), aves y pescado a 2 porciones al día. Aleene Payment porción son 3 onzas, lo que es aproximadamente el tamaño de un adalberto de naipes. · Coma entre 4 y 5 porciones de nueces, semillas y legumbres (frijoles [habichuelas], lentejas y arvejas [chícharos] secos y cocidos) a la semana. Andry porción incluye 1/3 taza de nueces, 2 cucharadas de semillas o ½ taza de frijoles o arvejas cocidos. · [address] y aceites a 2 o 3 porciones al día. Andry porción es 1 cucharadita de aceite vegetal o 2 cucharadas de aderezo para ensaladas. · Limite los dulces y el azúcar adicional a 5 porciones o menos por semana. Aleene Payment porción es 1 cucharada de Fernie o [place], ½ taza de sorbete o 1 taza de limonada. · Consuma menos de 2,300 miligramos (mg) de sodio al día. Si limita santos consumo de sodio a 1,500 mg al día, puede reducir santos presión arterial aún más. Consejos para tener éxito  · Inicie con cambios pequeños. No intente hacer cambios radicales en santos dieta de manera repentina. Podría sentir que extraña amor comidas favoritas y, por lo [place] morales, venice andry mayor probabilidad de que no cumpla el plan. Guillermo Ventura y Gerald. Ceil Jester que esos cambios se conviertan en un hábito, incorpore algunos [employer]. · Pruebe algo de lo siguiente:  ? Fíjese el objetivo de comer andry porción de fruta o de verduras en todas las comidas y los refrigerios. [place] facilitará alcanzar la cantidad diaria recomendada de frutas y verduras. ? Pruebe comer yogur cubierto con frutas frescas y nueces ace refrigerio o ace postre saludable. ? Agregue speedy, tomate, pepino y cebolla a amor sándwiches. ? Combine andry masa de pizza precocida con queso mozzarella de bajo contenido de grasa (\"low-fat\") y cúbralo con abundantes verduras. Intente utilizar tomates, calabaza, espinaca, brócoli, zanahorias, coliflor y cebollas. ?  Opte por comer andry variedad de verduras cortadas en trozos con un aderezo de bajo contenido de grasa ace refrigerio, en lugar de \"chips\" (tipo dash fritas) y un \"dip\" (producto untable). ? [place]oree semillas de girasol o almendras picadas [employer]. O intente agregar nueces o almendras picadas a las verduras cocidas. ? Pruebe algunas comidas vegetarianas con frijoles y arvejas. Maebelle Adelita o frijoles rojos a las ensaladas. Prepare burritos y tacos con puré de frijoles pintos o negros. ¿Dónde puede encontrar más información en inglés? Tanya Cheung a [email or web address] e ingrese a santos cuenta de MyChart. Giorgio Farah N235 en el [place] \"Search Health Information\" para más información (en inglés) sobre \"Dieta DASH: Instrucciones de cuidado. \"     Si no tiene andry cuenta, dotty elder en el enlace \"Sign Up Now\". Revisado: 16 ade, 1015               [facility] del contenido: 12.5  © 6994-5435 Healthwise, Incorporated. Las instrucciones de cuidado fueron adaptadas bajo licencia por [facility] ([facility]). Si usted tiene Williamson Harper afección médica o sobre estas instrucciones, siempre pregunte a santos profesional de allyssa. Healthwise, Incorporated niega toda garantía o responsabilidad por santos uso de esta información. Patient Education        amlodipine  Darin:  Lindsay Walker  ¿Cuál es la información más importante que henri saber sobre amlodipine? Siga las instrucciones en la etiqueta y el paquete de la [place]. Dígale a cada annabel de amor proveedores de allyssa acerca de todas amor condiciones médicas, alergias y todas las medicinas que usted esté [place]. ¿Qué es amlodipine? Amlodipine es un bloqueante del canal de calcio que dilata (ensancha) los vasos sanguíneos y [address] el flujo de Fredy kebede. Amlodipine se [place] para el tratamiento el dolor de pecho (angina) y otras condiciones causadas por enfermedad de las arterias coronarias.   Amlodipine también se [place] para el tratamiento de la presión arterial elevada (hipertensión) en adultos y [employer] de al menos 6 años de edad. La disminución de la presión arterial puede disminuir el riesgo de un accidente cerebrovascular o ataque al corazón. Amlodipine puede también usarse para fines no mencionados en esta guía del medicamento. ¿Qué debería discutir con [employer] del cuidado de la allyssa antes de torey amlodipine? Usted no debe usar amlodipine si es alérgico a éste. Para asegurarse que amlodipine es seguro para usted, dígale a santos médico si usted tiene:  · enfermedad del hígado; o  · un problema de la válvula del corazón llamado estenosis aórtica. Dígale a santos médico si usted está embarazada o planea quedar embarazada. No se conoce si amlodipine causará daño al bebé dimitrios. Sin embargo, tener presión arterial elevada ramesh el embarazo puede causar complicaciones ace diabetes o eclampsia (presión arterial peligrosamente cindy que puede provocar problemas médicos en la madre y el bebé). El beneficio de prevenir la hipertensión puede ser mayor que cualquier riesgo para el bebé. Dígale a santos médico si usted está amamantando. ¿Cómo henri torey amlodipine? Siga todas las instrucciones en la etiqueta de santos prescripción y jacqueline todas las guías del medicamento o las hojas de instrucción. Nubia Erichsen santos médico en ocasiones cambie santos dosis. Use la medicina exactamente ace indicado. Usted puede torey amlodipine con o sin comida. [employer] a la misma hora cada día. Agite la suspensión oral (líquida) antes de [employer] dosis. Use la jeringa de medición que viene con santos medicina, o use un dispositivo para la medición de dosis (no andry cuchara casera). Santos presión arterial necesitará ser evaluada con frecuencia. Santos dolor del pecho puede empeorar cuando empiece a torey amlodipine o cuando aumente santos dosis. Llame a santos médico si santso dolor del pecho es samra o persiste. Si usted está recibiendo tratamiento para la presión arterial cindy, siga usando amlodipine aunque se sienta joon.  La presión arterial cindy frecuentemente no tiene síntomas. Usted betzy vez necesite torey medicina para la presión arterial por el virgin[place] de santos luisito. Santos hipertensión o condición del corazón puede ser tratada con andry combinación de drogas. Use todos los medicamentos ace indicado y jacqueline todas las instrucciones del medicamento que reciba. No cambie la dosis o deje de torey cualquiera de amor medicamentos sin el consejo de santos médico. [place] es especialmente importante si usted también roselia nitroglycerin. Amlodipine es solamente parte de un programa completo de un tratamiento que también puede incluir dieta, ejercicio, control del [place], y [place] medicinas. Siga con cuidado santos rutina de dieta, medicinas, y ejercicio. Guarde a temperatura ambiental lejos de la humedad, calor, y silverio. ¿Qué sucede si me yesica andry dosis? [place] la medicina tan pronto pueda, yarelis sáltese la dosis que dejó de torey si tiene más de 12 horas de retraso para la dosis. No tome dos dosis a la vez. Chappell Reels en andry sobredosis? Busque atención médica de emergencia o llame a la línea de Poison Help al [phone]. Los síntomas de sobredosis pueden incluir latidos cardíacos rápidos, enrojecimiento o calor en amor brazos o piernas, o desmayo. ¿Qué henri evitar mientras luna amlodipine? Evite levantarse muy rápido de la posición de sentado o NOKIA, ya que puede sentirse [place]. ¿Cuáles son los efectos secundarios posibles de amlodipine? Busque atención médica de emergencia si usted tiene síntomas de [facility] reacción alérgica: ronchas; dificultad para respirar; hinchazón de santos sukh, labios, lengua, o garganta. En casos raros, cuando usted empieza a torey amlodipine, santos angina puede empeorar o puede tener un ataque al corazón. Busque ayuda médica o llame de inmediato a santos médico si usted tiene síntomas ace: dolor o presión en el pecho, dolor que se extiende a santos mandíbula o santos hombro, náusea, sudoración.   Llame a santos médico de inmediato si usted tiene:  · latidos cardíacos renny o aleteo cardíaco en santos pecho;  · dolor de pecho que empeora;  · hinchazón en amor pies o tobillos;  · somnolencia severa; o  · sensación de desvanecimiento, ace que se va a desmayar. Efectos secundarios comunes pueden incluir:  · mareo;  · sensación de cansancio;  · dolor de estómago, náusea; o  · enrojecimiento (calor, rojez o sensación de hormigueo). Esta lista no menciona todos los efectos secundarios y puede ser que ocurran otros. Llame a santos médico para consejos médicos relacionados a efectos secundarios. Usted puede reportar efectos secundarios llamando al FDA al 1-800-FDA-108. ¿Qué otras drogas afectarán a amlodipine? Dígale a santos médico todas amor otras medicinas, especialmente:  · nitroglycerin;  · simvastatin (Zocor, Simcor, Vytorin); o  · cualquier otras medicinas para el corazón o la presión arterial.  Esta lista no está completa. Otras drogas pueden afectar a amlodipine, incluyendo medicinas que se obtienen con o sin receta, vitaminas, y productos herbarios. No todas las interacciones posibles se enumeran aquí. ¿Dónde puedo obtener más información? Santos farmacéutico le puede rik más información acerca de amlodipine. Recuerde, [place] y todas las otras medicinas fuera del alcance de los niños, no comparta nunca amor medicinas con otros, y use Jumpido solo para la condición por la que fue recetada. Se lazo hecho todo lo posible para que la información que proviene de [place] sea precisa, actual, y Herminio Batres no se hace garantía de betzy. La información sobre el medicamento incluida aquí puede tener nuevas recomendaciones.  La información preparada por [employer] se ha creado para uso del profesional de la allyssa y para el consumidor en los [place] y por lo cual Multum no certifica que el uso fuera de los [place] sea apropiado, a menos que se mencione específicamente lo cual. La información de [employer] sobre drogas no sanciona drogas, ni diagnóstica al paciente o recomienda terapia. La información de [employer] sobre drogas sirve ace andry magdalena de información diseñada para la ayuda del profesional de la allyssa licenciado en el cuidado de amor pacientes y/o para servir al consumidor que reciba shelly servicio ace un suplemento a, y no ace sustituto de la competencia, experiencia, conocimiento y opinión del profesional de la allyssa. La ausencia en éste de andry advertencia para andry droga o combinación de drogas no debe, de ninguna forma, interpretarse cae que la droga o la combinación de drogas ayaz seguras, [place], o apropiadas para cualquier paciente. Multum no se responsabiliza por ningún aspecto del cuidado médico que reciba con la ayuda de la información que proviene de [place] khanh. La información incluida aquí no se ha creado con la intención de cubrir todos los usos posibles, instrucciones, precauciones, advertencias, interacciones con otras drogas, reacciones alérgicas, o efectos secundarios. Si usted tiene alguna pregunta acerca de las drogas que está tomando, consulte con santos médico, [place], o farmacéutico.  Copyright 4933-2173 [employer], Inc. Version: 15.01. Revision date: 10/28/2019. Las instrucciones de cuidado fueron adaptadas bajo licencia por [facility] ([facility]). Si usted tiene [place] afección médica o sobre estas instrucciones, siempre pregunte a santos profesional de allyssa. [facility], Incorporated niega toda garantía o responsabilidad por santos uso de esta información.

## 2020-07-16 NOTE — PROGRESS NOTES
[address] [facility] Olimpia LOVELL [address] [facility] [address]  Dept: [phone]  Dept Fax: [phone]  Loc: [phone]      Majel Sever is a 37 y.o. female who presents todayfor Dizziness (started yesterday also having Right side neck spasms); Blurred Vision (off and on ); and Discuss Medications (would like to discuss vitamins)      : John Gamboa is here for return visit for follow-up on hypertension, dizziness with bending down, muscle pain in right side of neck, and irregular periods. She has been taking the amlodipine 5 mg daily. States that when checking her blood pressure it runs between 976T to 703 systolic. Denies any peripheral edema or other adverse effects. States for the last 7 to 8 months she has been having dizziness when bending down. No changes in vision, hearing, numbness tingling or pain in extremities. Denies chest pain shortness of breath. States that she increased her intake of water yesterday and her dizziness improved. States that she has very mild morning headaches. Denies night time apneic episodes. She does note that she has amblyopia of her right eye and has not seen an eye doctor since [place] [facility].    States that work has been stressful because her employer has been laying off other staff members which has increased her workload. She states that she is interested in looking for another job. Last menstrual period week of July. States that she did not have a period in June. Before that her period was every 28 to 40 days. Is interested in starting birth control pills for regulation of menstrual cycle. Denies being on birth control in the past.  Denies chance of pregnancy. patient has No Known Allergies. Past MedicalHistory  John Gamboa  has a past medical history of Hypertension.     Past Surgical History  The patient  has a past surgical history that includes diaphoretic. HENT:      Head: Normocephalic and atraumatic. Right Ear: Tympanic membrane, ear canal and external ear normal.      Left Ear: Tympanic membrane, ear canal and external ear normal.      Nose: Nose normal.   Eyes:      General: No scleral icterus. Right eye: No discharge. Left eye: No discharge. Conjunctiva/sclera: Conjunctivae normal.   Neck:      Musculoskeletal: Normal range of motion. Cardiovascular:      Rate and Rhythm: Normal rate and regular rhythm. Heart sounds: Normal heart sounds. No murmur. Pulmonary:      Effort: Pulmonary effort is normal.      Breath sounds: Normal breath sounds. Abdominal:      Palpations: Abdomen is soft. Tenderness: There is no abdominal tenderness. Musculoskeletal: Normal range of motion. Right lower leg: No edema. Left lower leg: No edema. Skin:     General: Skin is warm and dry. Findings: No erythema or rash. Neurological:      General: No focal deficit present. Mental Status: She is alert and oriented to person, place, and time. Cranial Nerves: No cranial nerve deficit. Sensory: No sensory deficit. Motor: No weakness. Psychiatric:         Mood and Affect: Mood normal.         Behavior: Behavior normal.         Thought Content: Thought content normal.         Judgment: Judgment normal.         Assessment/Plan:   1. Prediabetes  Recheck A1c. Today 6.4%, last A1c 6.1%  Will discuss in further detail at visit in 1 month regarding lifestyle and diet modifications. - POCT glycosylated hemoglobin (Hb A1C)    2. Need for Tdap vaccination  Tdap today  - Tdap (age 10y-63y) IM (Adacel)    3. Essential hypertension  Blood pressure improved since last visit. Increase to 10 mg daily. Continue monitoring at home. Counseled patient on peripheral edema side effects to watch out for. - amLODIPine (NORVASC) 10 MG tablet; Take 1 tablet by mouth daily  Dispense: 30 tablet; Refill: 0    4.

## 2020-07-16 NOTE — LETTER
Maritza Bear was seen and treated in our clinic on 7/16/2020. She may return to work on 7/17/2020. If you have any questions or concerns, please don't hesitate to call.     Sincerely,   Nathen Drilling, DO

## 2020-07-16 NOTE — PROGRESS NOTES
S: 37 y.o. female with   Chief Complaint   Patient presents with    Dizziness     started yesterday also having Right side neck spasms    Blurred Vision     off and on     Discuss Medications     would like to discuss vitamins       HPI: dizziness:  6-7 months of symptoms, worse a couple days ago. Gets better when she drinks more water. Worse when she bends down to pick something off the floor and then stands back up. No dizziness with change of position sitting then lying down. No vision or hearing changes. No numbness or weakness. Mild morning headaches. No apnea at night. Periods every 28-46 days. Wants BCPs. Never been on them. LMP was first week of July. HTN:  Amlodipine 5mg, 130-140 SBP on average at home. No side effects. BP Readings from Last 3 Encounters:   07/16/20 130/82   02/12/20 (!) 152/88   01/29/20 (!) 151/74     Wt Readings from Last 3 Encounters:   07/16/20 295 lb 12.8 oz (134.2 kg)   02/12/20 286 lb (129.7 kg)   01/29/20 280 lb (127 kg)           O: VS:  height is 5' 3\" (1.6 m) and weight is 295 lb 12.8 oz (134.2 kg). Her temporal temperature is 98.2 °F (36.8 °C). Her blood pressure is 130/82 and her pulse is 82. Her respiration is 20 and oxygen saturation is 98%. AAO/NAD, appropriate affect for mood  CV:  RRR, no murmur  Resp: CTAB       Diagnosis Orders   1. Prediabetes  POCT glycosylated hemoglobin (Hb A1C)   2. Need for Tdap vaccination  Tdap (age 10y-63y) IM (Adacel)   3. Essential hypertension  amLODIPine (NORVASC) 10 MG tablet   4. Irregular periods  norgestimate-ethinyl estradiol (ORTHO-CYCLEN, 28,) 0.25-35 MG-MCG per tablet       Plan:  TDAP today. Recheck a1c. sprintec prescribed. Increase amlodipine to 10mg. Increase water intake. F/u 1 month.         Health Maintenance Due   Topic Date Due    Varicella vaccine (1 of 2 - 2-dose childhood series) 10/27/1984    DTaP/Tdap/Td vaccine (1 - Tdap) 10/27/2002         Attending Physician Statement  I have discussed the case, including pertinent history and exam findings with the resident. I also have seen the patient and performed key portions of the examination. I agree with the documented assessment and plan as documented by the resident.   GE modifier added to this encounter      Otilio Mancia DO 7/16/2020 3:52 PM

## 2020-09-04 NOTE — TELEPHONE ENCOUNTER
Dahlia Blunt called requesting a refill on the following medications:  Requested Prescriptions     Pending Prescriptions Disp Refills    amLODIPine (NORVASC) 10 MG tablet 30 tablet 0     Sig: Take 1 tablet by mouth daily     Pharmacy verified:  .sneha    [facility]    Date of last visit: 7/16/20  Date of next visit (if applicable): Visit date not found

## 2020-09-08 RX ORDER — AMLODIPINE BESYLATE 10 MG/1
10 TABLET ORAL DAILY
Qty: 30 TABLET | Refills: 0 | Status: SHIPPED | OUTPATIENT
Start: 2020-09-08 | End: 2020-10-08 | Stop reason: SDUPTHER

## 2020-10-08 RX ORDER — AMLODIPINE BESYLATE 10 MG/1
10 TABLET ORAL DAILY
Qty: 30 TABLET | Refills: 0 | Status: SHIPPED | OUTPATIENT
Start: 2020-10-08 | End: 2020-11-09 | Stop reason: SDUPTHER

## 2020-10-08 NOTE — TELEPHONE ENCOUNTER
Please schedule appointment with me (or any yellow team member) for 6 months from last appointment day.

## 2020-10-08 NOTE — TELEPHONE ENCOUNTER
Tapan Ross called requesting a refill on the following medications:  Requested Prescriptions     Pending Prescriptions Disp Refills    amLODIPine (NORVASC) 10 MG tablet 30 tablet 0     Sig: Take 1 tablet by mouth daily     Pharmacy verified:  .sneha    [address] [facility].    Date of last visit: 7/16/20  Date of next visit (if applicable): Visit date not found

## 2020-11-06 NOTE — TELEPHONE ENCOUNTER
Jael Spear called requesting a refill on the following medications:  Requested Prescriptions     Pending Prescriptions Disp Refills    amLODIPine (NORVASC) 10 MG tablet 30 tablet 0     Sig: Take 1 tablet by mouth daily     [facility]      Date of last visit:   Date of next visit (if applicable): 95/2/7022

## 2020-11-09 RX ORDER — AMLODIPINE BESYLATE 10 MG/1
10 TABLET ORAL DAILY
Qty: 30 TABLET | Refills: 3 | Status: SHIPPED | OUTPATIENT
Start: 2020-11-09 | End: 2020-12-04 | Stop reason: SDUPTHER

## 2020-12-04 ENCOUNTER — OFFICE VISIT (OUTPATIENT)
Dept: FAMILY MEDICINE CLINIC | Age: 35
End: 2020-12-04
Payer: COMMERCIAL

## 2020-12-04 VITALS
OXYGEN SATURATION: 98 % | BODY MASS INDEX: 51.91 KG/M2 | TEMPERATURE: 97.7 F | RESPIRATION RATE: 12 BRPM | HEART RATE: 91 BPM | SYSTOLIC BLOOD PRESSURE: 130 MMHG | DIASTOLIC BLOOD PRESSURE: 84 MMHG | HEIGHT: 63 IN | WEIGHT: 293 LBS

## 2020-12-04 LAB — HBA1C MFR BLD: 6.3 % (ref 4.3–5.7)

## 2020-12-04 PROCEDURE — 99214 OFFICE O/P EST MOD 30 MIN: CPT | Performed by: STUDENT IN AN ORGANIZED HEALTH CARE EDUCATION/TRAINING PROGRAM

## 2020-12-04 RX ORDER — AMLODIPINE BESYLATE 10 MG/1
10 TABLET ORAL DAILY
Qty: 30 TABLET | Refills: 3 | Status: SHIPPED | OUTPATIENT
Start: 2020-12-04 | End: 2021-05-11 | Stop reason: SDUPTHER

## 2020-12-04 RX ORDER — METFORMIN HYDROCHLORIDE 500 MG/1
500 TABLET, EXTENDED RELEASE ORAL
Qty: 90 TABLET | Refills: 1 | Status: SHIPPED | OUTPATIENT
Start: 2020-12-04 | End: 2021-10-14 | Stop reason: SDUPTHER

## 2020-12-04 RX ORDER — HYDROCHLOROTHIAZIDE 12.5 MG/1
12.5 CAPSULE, GELATIN COATED ORAL EVERY MORNING
Qty: 30 CAPSULE | Refills: 0 | Status: CANCELLED | OUTPATIENT
Start: 2020-12-04

## 2020-12-04 ASSESSMENT — ENCOUNTER SYMPTOMS
DIARRHEA: 0
TROUBLE SWALLOWING: 0
BLOOD IN STOOL: 0
CONSTIPATION: 0
SHORTNESS OF BREATH: 1
EYE PAIN: 0
COUGH: 0
ABDOMINAL PAIN: 0

## 2020-12-04 NOTE — PROGRESS NOTES
Medicine - Dietitian    metFORMIN (GLUCOPHAGE-XR) 500 MG extended release tablet   4. Need for influenza vaccination     5. Essential hypertension  amLODIPine (NORVASC) 10 MG tablet       Plan  Will start the glucophage 500mg daily    Will stay on the 10mg of the norvasc    Will see dietician here    Will see you back in 3 months - labs before that visit     Return in about 3 months (around 3/4/2021). Orders Placed:  Orders Placed This Encounter   Procedures    TSH    T4, Free    Lipid Panel    CBC Auto Differential    Comprehensive Metabolic Panel   [facility] - Dietitian    POCT glycosylated hemoglobin (Hb A1C)     Medications Prescribed:  Orders Placed This Encounter   Medications    metFORMIN (GLUCOPHAGE-XR) 500 MG extended release tablet     Sig: Take 1 tablet by mouth daily (with breakfast)     Dispense:  90 tablet     Refill:  1    amLODIPine (NORVASC) 10 MG tablet     Sig: Take 1 tablet by mouth daily     Dispense:  30 tablet     Refill:  3       No future appointments. Health Maintenance Due   Topic Date Due    Varicella vaccine (1 of 2 - 2-dose childhood series) 10/27/1984    Flu vaccine (1) 09/01/2020         Attending Physician Statement  I have discussed the case, including pertinent history and exam findings with the resident. I also have seen the patient and performed key portions of the examination. I agree with the documented assessment and plan as documented by the resident.   GE modifier added to this encounter      Mikeal Heimlich, DO 12/4/2020 8:44 AM

## 2020-12-04 NOTE — LETTER
[address] [place] [address],[address] [facility] [address]  [facility] [address]  Phone: [phone]  Fax: [phone], DO        December 4, 2020     Patient: Neva Wade   YOB: 1985   Date of Visit: 12/4/2020       To Whom it May Concern:    Neva Wade was seen in my clinic on 12/4/2020. She may return to work on 12/4/2020. If you have any questions or concerns, please don't hesitate to call.     Sincerely,         Maria Teresa Hoover, DO

## 2020-12-04 NOTE — PROGRESS NOTES
After pharmacist chart review, the following recommendations are made:    -Per the ADA guidelines, metformin therapy for prevention of T2DM should be   considered in those with prediabetes, especially for those with BMI > 35 kg/m2, those aged >60 yrs, and women with prior   gestational diabetes mellitus    Consider metformin 500-850mg daily.     Khurram Shin  PGY-2 Ambulatory Care Resident  [address]     o: [phone]    CLINICAL PHARMACY CONSULT: MED RECONCILIATION/REVIEW ADDENDUM    For Pharmacy Admin Tracking Only    PHSO: Yes  Total # of Interventions Recommended: 1  - New Order #: 1 New Medication Order Reason(s): Needs Additional Medication Therapy  Total Interventions Accepted: 1  Time Spent (min): 15

## 2020-12-04 NOTE — PATIENT INSTRUCTIONS
Patient Education      Patient Education        Aprenda acerca de las pautas alimentarias para diabetes  Learning About Diabetes Food Guidelines  Instrucciones de cuidado    La planificación de las comidas es importante para el manejo de la diabetes. Ayuda a mantener el azúcar en la richard en el nivel ideal (que usted fija con santos médico). Usted no tiene que comer alimentos especiales. Puede comer lo mismo que santos jhonathan, incluso dulces de vez en cuando. Beth debe prestar atención a la cantidad y la frecuencia con que come ciertos alimentos. Josh vez desee colaborar con un dietista o educador de diabetes certificado (CDE, por amor siglas en inglés) para que le ayuden a planificar las comidas y los refrigerios. Un dietista o CDE también puede ayudarle a bajar de peso si herrera es annabel de amor objetivos. ¿Qué debería saber acerca de ingerir carbohidratos? Manejar la cantidad de carbohidratos que ingiere es andry parte importante de la alimentación saludable cuando tiene diabetes. Los carbohidratos se encuentran en muchos alimentos. · Sepa qué alimentos contienen carbohidratos. Y aprenda qué cantidad de carbohidratos contienen los diferentes alimentos. ? El pan, los cereales, la pasta y el arroz tienen aproximadamente 15 gramos de carbohidratos por porción. Andry porción equivale a 1 rebanada de pan (1 onza o 28 g), ½ taza de cereal cocido o 1/3 de taza de pasta o arroz cocidos. ? Las frutas tienen 15 gramos de carbohidratos por porción. Genia Coma porción es 1 fruta fresca pequeña, ace andry Corpus linda o andry naranja; ½ banana (plátano); ½ taza de fruta cocida o enlatada; ½ taza de jugo de fruta; 1 taza de melón o frambuesas; o 2 cucharadas de frutas secas. ? Alphonsa Vy y el yogur sin azúcar agregado tienen 15 gramos de carbohidratos por porción. Genia Coma porción es 1 taza de [place] o 2/3 taza de yogur sin azúcar agregado. ? Las verduras con almidón tienen 15 gramos de carbohidratos por porción.  Genia Coma porción es ½ taza de puré de papa o camote (batata, boniato); 1 taza de calabacín; ½ papa horneada pequeña; ½ taza de frijoles cocidos; o ½ taza de maíz (elote) o arvejas (chícharos) cocidos. · Aprenda cuántos carbohidratos debe consumir cada día y en cada comida. Un dietista o CDE le puede enseñar cómo llevar la cuenta de los carbohidratos que consume. A esto se le llama recuento de carbohidratos. · Si no está seguro de cómo [place] gramos de carbohidratos, utilice el Método del [place] para planificar las comidas. Es andry Maggie Breed y rápida de asegurarse de que consuma comidas equilibradas. También le ayuda a distribuir los carbohidratos ramesh el día. ? Divida el plato por tipo de alimento. Llene medio plato con verduras sin almidón, ponga carne u otras proteínas en andry cuarta parte del plato y granos o verduras con almidón en el último cuarto del plato. A esto puede agregarle un pequeño pedazo de fruta y 3 taza de [place] o yogur, según la cantidad de carbohidratos que deba consumir en andry comida. · Trate de comer aproximadamente la misma cantidad de carbohidratos en cada comida. No \"reserve\" santos cantidad diaria de carbohidratos para consumirlos en andry nitin comida. · Las proteínas contienen muy pocos o nada de carbohidratos por porción. Los ejemplos de proteínas incluyen carne de res, Lizbeth heights, [place], [place], [place], tofu, Jett-barre, requesón (\"cottage cheese\") y la mantequilla de cacahuate [place]). Andry porción de carne son 3 onzas (85 g), lo cual es aproximadamente del tamaño de andry baraja de naipes. Los ejemplos de porciones de sustitutos de la carne (equivalente a 1 onza o 28 g de carne) son 1/4 de taza de requesón, 1 huevo, 1 cucharada de [place] de cacahuate y ½ taza de tofu. ¿Cómo puede comer fuera y aún así comer de modo saludable? · Aprenda a calcular los tamaños de las porciones de alimentos que contienen carbohidratos. Si mide la comida en casa, será más fácil calcular la cantidad en andry porción de comida de restaurante.   · Si el platillo que pide contiene demasiados carbohidratos (ace dash, maíz o frijoles al horno), pida un alimento bajo en carbohidratos en mackey lugar. Pida andry ensalada o verduras. · Si [place] insulina, revise mackey azúcar en la richard antes y después de comer fuera para ayudarle a planear cuánto comer en el futuro. · Si usted come más carbohidratos de lo planeado en andry comida, dé un paseo o dotty otro tipo de ejercicio. Burna ayudará a reducir el azúcar en la richard. ¿Qué más debería saber? · Limite las grasas saturadas, ace la grasa de la carne y productos lácteos. Esta es andry opción saludable, porque las personas que tienen diabetes tienen un mayor riesgo de enfermedades del corazón. Así que elija urbano magros de carne y productos lácteos descremados o semidescremados. Utilice aceite de soriano o de canola en lugar de mantequilla o manteca al cocinar. · No se salte comidas. Mackey nivel de azúcar en la richard puede bajar demasiado si usted se salta comidas y roselia insulina o ciertos medicamentos para la diabetes. · Consulte con mackey médico antes de beber alcohol. El alcohol puede hacer que mackey azúcar en la richard baje demasiado. El alcohol también puede causar andry reacción adversa si usted roselia ciertos medicamentos para la diabetes. La atención de seguimiento es andry parte clave de mackey tratamiento y seguridad. Asegúrese de hacer y acudir a todas las citas, y llame a mackey médico si está teniendo problemas. También es andry buena idea saber los resultados de amor exámenes y mantener andry lista de los medicamentos que roselia. ¿Dónde puede encontrar más información en inglés? Vallorie June a [email or web address] e ingrese a mackey cuenta de MyCariella. Cale Evette G247 en el Jyoti Taylor \"Search Health Information\" para más información (en inglés) sobre \"Aprenda acerca de las pautas alimentarias para diabetes. \"     Si no tiene andry cuenta, dotty elder en el enlace \"Sign Up Now\".   Revisado: 20 ade, 2019               Versión del contenido: 12.6  © 9068-9485 [employer]. Las instrucciones de cuidado fueron adaptadas bajo licencia por [facility] ([facility]). Si usted tiene [place] afección médica o sobre estas instrucciones, siempre pregunte a santos profesional de allyssa. Healthwise, Incorporated niega toda garantía o responsabilidad por santos uso de esta información. Aprenda sobre la cirugía bariátrica  Learning About Bariatric Surgery  ¿Qué es la cirugía bariátrica? La cirugía bariátrica es andry operación que le ayuda a bajar de [place]. Terra tipo de [facility] se [place] únicamente para personas con mucho sobrepeso y que no wilson podido adelgazar con Marcellus Patel y ejercicio. Esta cirugía reduce el tamaño del estómago. Algunos tipos de [place] la conexión entre santos estómago e intestinos. ¿Cómo se hace la cirugía bariátrica? La cirugía bariátrica puede ser \"abierta\" o \"laparoscópica\". La cirugía abierta se hace por medio de un gran julia (incisión) en el abdomen. La cirugía laparoscópica se hace a través de varias incisiones pequeñas. El médico coloca un tubo iluminado, llamado laparoscopio, y otros instrumentos quirúrgicos a través de pequeños urbano en santos abdomen. El médico puede darrel amor órganos con el laparoscopio. Existen diferentes tipos de [facility] bariátrica. Cirugía de manga gástrica  La cirugía suele hacerse a través de varias incisiones pequeñas en el abdomen. El médico elimina más de la mitad de santos estómago. [place] andry manga delgada, o tubo, que es aproximadamente del tamaño de andry banana. Debido a que se ha extraído parte de santos estómago, no puede revertirse. Cirugía de derivación gástrica en Y de Niki  La cirugía en Y de Niki cambia la conexión entre el estómago y los intestinos. El médico separa andry parte del estómago del [place] del [place]. [place] crea andry pequeña bolsa. La nueva bolsa retendrá los alimentos que coma. El médico conecta la bolsa del estómago con la parte media del intestino polo.   Abbott Sicard gástrica  La cirugía suele hacerse a través de varias incisiones pequeñas en el abdomen. El médico envuelve la parte superior del estómago con [place]. [place] crea andry bolsa pequeña. El pequeño tamaño de la bolsa significa que se sentirá lleno después de comer tan solo andry pequeña cantidad de comida. El médico puede inflar o desinflar la barnes para ajustar mackey [place]. [place] permite que el médico ajuste la velocidad con la que los alimentos pasan de la bolsa [place]. No cambia la conexión [employer] y los intestinos. ¿Qué puede esperar después de la cirugía? Es posible que deba permanecer en el hospital ramesh annabel o más humberto después de Dawna Stone. La duración de mackey estancia dependerá del tipo de cirugía que le daren. La mayoría de las personas [employer] 2 y 4 semanas antes de poder volver a mackey rutina habitual.  Ramesh las primeras 2 a 6 semanas después de la [place], es probable que deba seguir andry Paloma Silence. Poco a poco, podrá comer más alimentos sólidos. Mackey médico podría aconsejarle que colabore con un dietista. De shelly modo, estará seguro de obtener suficientes proteínas, vitaminas y minerales a medida que va adelgazando. Aun con andry dieta saludable, es posible que necesite torey suplementos minerales y vitamínicos. Después de la [place] f[place], no podrá comer mucho de [employer]. Se sentirá lleno rápidamente. Trate de no comer demasiado de andry vez o de no comer alimentos con alto contenido de grasa o azúcar. Si lo hace, podría vomitar, tener dolor de [place] o [address]. Probablemente baje de peso muy rápidamente en los primeros meses después de la Saint fawad. A medida que pase el tiempo, la pérdida de peso será más lenta. Tendrá citas periódicas con el médico para revisar mackey progreso. Piense en la cirugía bariátrica ace andry herramienta para ayudarle a bajar de [place]. No es andry solución instantánea. Ilene Lone seguir andry dieta saludable y hacer ejercicio en forma regular.  [place] le ayudará a alcanzar el peso deseado y a evitar volver a aumentar el peso que perdió. La atención de seguimiento es andry parte clave de santos tratamiento y seguridad. Asegúrese de hacer y acudir a todas las citas, y llame a santos médico si está teniendo problemas. También es andry buena idea saber los resultados de amor exámenes y mantener andry lista de los medicamentos que roselia. ¿Dónde puede encontrar más información en inglés? Valery Tan a [email or web address] e ingrese a santos cuenta de MyChart. Ebenezer Gant W201 en el cuadro \"Search Health Information\" para más información (en inglés) sobre \"Aprenda sobre la [facility] bariátrica. \"     Si no tiene andry cuenta, dotty elder en el enlace \"Sign Up Now\". Revisado: 11 ade, 7894               IEGCWFP del contenido: 12.6  © 3082-1447 Healthwise, Incorporated. Las instrucciones de cuidado fueron adaptadas bajo licencia por [facility] ([facility]). Si usted tiene [place] afección médica o sobre estas instrucciones, siempre pregunte a santos profesional de allyssa. Healthwise, Incorporated niega toda garantía o responsabilidad por santos uso de esta información. Thank you   1. Thank you for trusting us with your healthcare needs. You may receive a survey regarding today's visit. It would help us out if you would take a few moments to provide your feedback. We value your input. 2. Please bring in ALL medications BOTTLES, including inhalers, herbal supplements, over the counter, prescribed & non-prescribed medicine. The office would like actual medication bottles and a list.   3. Please note our OFFICE POLICIES:   a. Prior to getting your labs drawn, please check with your insurance company for benefits and eligibility of lab services. Often, insurance companies cover certain tests for preventative visits only. It is patient's responsibility to see what is covered. b. We are unable to change a diagnosis after the test has been performed.    c. Lab orders will not be re-printed. Please hold onto your original lab orders and take them to your lab to be completed. d. If you no show your scheduled appointment three times, you will be dismissed from this practice. e. Ruth Bull must be completed 24 hours prior to your schedule appointment. 4. If the list below has been completed, PLEASE FAX RECORDS TO OUR OFFICE @ [phone].  Once the records have been received we will update your records at our office:  Health Maintenance Due   Topic Date Due    Varicella vaccine (1 of 2 - 2-dose childhood series) 10/27/1984    Flu vaccine (1) 09/01/2020

## 2021-03-04 ENCOUNTER — TELEPHONE (OUTPATIENT)
Dept: FAMILY MEDICINE CLINIC | Age: 36
End: 2021-03-04

## 2021-03-04 ENCOUNTER — OFFICE VISIT (OUTPATIENT)
Dept: FAMILY MEDICINE CLINIC | Age: 36
End: 2021-03-04
Payer: COMMERCIAL

## 2021-03-04 ENCOUNTER — NURSE ONLY (OUTPATIENT)
Dept: LAB | Age: 36
End: 2021-03-04

## 2021-03-04 VITALS
HEART RATE: 91 BPM | SYSTOLIC BLOOD PRESSURE: 138 MMHG | BODY MASS INDEX: 51.91 KG/M2 | DIASTOLIC BLOOD PRESSURE: 78 MMHG | HEIGHT: 63 IN | WEIGHT: 293 LBS | OXYGEN SATURATION: 99 % | TEMPERATURE: 97.1 F | RESPIRATION RATE: 16 BRPM

## 2021-03-04 DIAGNOSIS — Z80.3 FAMILY HISTORY OF BREAST CANCER IN MOTHER: ICD-10-CM

## 2021-03-04 DIAGNOSIS — N63.0 BREAST LUMP: Primary | ICD-10-CM

## 2021-03-04 DIAGNOSIS — E66.01 MORBID OBESITY (HCC): ICD-10-CM

## 2021-03-04 DIAGNOSIS — N63.0 BREAST LUMP IN LOWER INNER QUADRANT: Primary | ICD-10-CM

## 2021-03-04 DIAGNOSIS — Z00.00 ENCOUNTER FOR WELLNESS EXAMINATION IN ADULT: ICD-10-CM

## 2021-03-04 DIAGNOSIS — K21.9 GASTROESOPHAGEAL REFLUX DISEASE, UNSPECIFIED WHETHER ESOPHAGITIS PRESENT: ICD-10-CM

## 2021-03-04 LAB
ALBUMIN SERPL-MCNC: 4.3 G/DL (ref 3.5–5.1)
ALP BLD-CCNC: 68 U/L (ref 38–126)
ALT SERPL-CCNC: 27 U/L (ref 11–66)
ANION GAP SERPL CALCULATED.3IONS-SCNC: 11 MEQ/L (ref 8–16)
AST SERPL-CCNC: 22 U/L (ref 5–40)
BASOPHILS # BLD: 0.6 %
BASOPHILS ABSOLUTE: 0.1 THOU/MM3 (ref 0–0.1)
BILIRUB SERPL-MCNC: 0.3 MG/DL (ref 0.3–1.2)
BUN BLDV-MCNC: 15 MG/DL (ref 7–22)
CALCIUM SERPL-MCNC: 9.5 MG/DL (ref 8.5–10.5)
CHLORIDE BLD-SCNC: 101 MEQ/L (ref 98–111)
CHOLESTEROL, TOTAL: 194 MG/DL (ref 100–199)
CO2: 26 MEQ/L (ref 23–33)
CREAT SERPL-MCNC: 0.6 MG/DL (ref 0.4–1.2)
EOSINOPHIL # BLD: 1.3 %
EOSINOPHILS ABSOLUTE: 0.1 THOU/MM3 (ref 0–0.4)
ERYTHROCYTE [DISTWIDTH] IN BLOOD BY AUTOMATED COUNT: 13.1 % (ref 11.5–14.5)
ERYTHROCYTE [DISTWIDTH] IN BLOOD BY AUTOMATED COUNT: 42.7 FL (ref 35–45)
GFR SERPL CREATININE-BSD FRML MDRD: > 90 ML/MIN/1.73M2
GLUCOSE BLD-MCNC: 114 MG/DL (ref 70–108)
HCT VFR BLD CALC: 45.2 % (ref 37–47)
HDLC SERPL-MCNC: 44 MG/DL
HEMOGLOBIN: 14.1 GM/DL (ref 12–16)
IMMATURE GRANS (ABS): 0.03 THOU/MM3 (ref 0–0.07)
IMMATURE GRANULOCYTES: 0.3 %
LDL CHOLESTEROL CALCULATED: 129 MG/DL
LYMPHOCYTES # BLD: 25.1 %
LYMPHOCYTES ABSOLUTE: 2.4 THOU/MM3 (ref 1–4.8)
MCH RBC QN AUTO: 27.8 PG (ref 26–33)
MCHC RBC AUTO-ENTMCNC: 31.2 GM/DL (ref 32.2–35.5)
MCV RBC AUTO: 89.2 FL (ref 81–99)
MONOCYTES # BLD: 3.7 %
MONOCYTES ABSOLUTE: 0.4 THOU/MM3 (ref 0.4–1.3)
NUCLEATED RED BLOOD CELLS: 0 /100 WBC
PLATELET # BLD: 327 THOU/MM3 (ref 130–400)
PMV BLD AUTO: 10.3 FL (ref 9.4–12.4)
POTASSIUM SERPL-SCNC: 3.9 MEQ/L (ref 3.5–5.2)
RBC # BLD: 5.07 MILL/MM3 (ref 4.2–5.4)
SEG NEUTROPHILS: 69 %
SEGMENTED NEUTROPHILS ABSOLUTE COUNT: 6.6 THOU/MM3 (ref 1.8–7.7)
SODIUM BLD-SCNC: 138 MEQ/L (ref 135–145)
T4 FREE: 1.31 NG/DL (ref 0.93–1.76)
TOTAL PROTEIN: 8.5 G/DL (ref 6.1–8)
TRIGL SERPL-MCNC: 107 MG/DL (ref 0–199)
TSH SERPL DL<=0.05 MIU/L-ACNC: 1.73 UIU/ML (ref 0.4–4.2)
WBC # BLD: 9.5 THOU/MM3 (ref 4.8–10.8)

## 2021-03-04 PROCEDURE — 99213 OFFICE O/P EST LOW 20 MIN: CPT | Performed by: STUDENT IN AN ORGANIZED HEALTH CARE EDUCATION/TRAINING PROGRAM

## 2021-03-04 RX ORDER — FAMOTIDINE 20 MG/1
20 TABLET, FILM COATED ORAL 2 TIMES DAILY PRN
Qty: 180 TABLET | Refills: 1 | Status: CANCELLED | OUTPATIENT
Start: 2021-03-04

## 2021-03-04 RX ORDER — FAMOTIDINE 20 MG/1
20 TABLET, FILM COATED ORAL 2 TIMES DAILY
Qty: 60 TABLET | Refills: 3 | Status: SHIPPED | OUTPATIENT
Start: 2021-03-04 | End: 2021-10-25

## 2021-03-04 ASSESSMENT — ENCOUNTER SYMPTOMS
EYE PAIN: 0
BLOOD IN STOOL: 0
DIARRHEA: 0
TROUBLE SWALLOWING: 0
COUGH: 0
SHORTNESS OF BREATH: 0
CONSTIPATION: 0
ABDOMINAL PAIN: 0

## 2021-03-04 ASSESSMENT — PATIENT HEALTH QUESTIONNAIRE - PHQ9
2. FEELING DOWN, DEPRESSED OR HOPELESS: 0
SUM OF ALL RESPONSES TO PHQ QUESTIONS 1-9: 0
SUM OF ALL RESPONSES TO PHQ9 QUESTIONS 1 & 2: 0
SUM OF ALL RESPONSES TO PHQ QUESTIONS 1-9: 0

## 2021-03-04 NOTE — PROGRESS NOTES
Shagufta Chen is a 38 y.o. female who presents today for:  Chief Complaint   Patient presents with    Breast Mass     Pt presents c/o a lump in her L breast. She noticed it yesterday. She denies pain, but states she feels like it pokes. There is no d/c or clor changes.  Other     Pt would like a referral to get a pap smear done.  Gastroesophageal Reflux     Pt is c/o an issue with GERD. She was on a med for this in [facility], but does not remember the name. HPI:   Left breast \"sac\", noted first yesterday. No nipple discharge. Last mammogram in 2018, not on record. Family hx of breast cancer in mother, dx at 50. Denies unintentional weight loss, fevers, night sweats, lymphadenopathy. BP elevated but improved on recheck. Taking amlodipine. Reports irregular episodes of acid reflux. Has a history of hiatal hernia diagnosed in [address] without any improvement. Taking pepcid OTC with improvement, Reports that pain is not that bad. Occurring throughout the day after meals. Takes Pepcid with improvement. Requesting a prescription. Objective:     Vitals:    03/04/21 1044 03/04/21 1143   BP: (!) 156/96 138/78   Site:  Left Upper Arm   Pulse: 91    Resp: 16    Temp: 97.1 °F (36.2 °C)    SpO2: 99%    Weight: 299 lb 9.6 oz (135.9 kg)    Height: 5' 3\" (1.6 m)      Repeat /78    Review of Systems   Constitutional: Negative for chills, fatigue and fever. HENT: Negative for congestion, ear pain, postnasal drip and trouble swallowing. Eyes: Negative for pain and visual disturbance. Respiratory: Negative for cough and shortness of breath. Cardiovascular: Negative for chest pain and palpitations. Gastrointestinal: Negative for abdominal pain, blood in stool, constipation and diarrhea. Genitourinary: Negative for dysuria and hematuria. Skin: Negative for rash and wound. Neurological: Negative for dizziness and headaches. Psychiatric/Behavioral: The patient is not nervous/anxious. Physical Exam  Vitals signs and nursing note reviewed. Constitutional:       General: She is not in acute distress. Appearance: She is well-developed. She is obese. She is not diaphoretic. HENT:      Head: Normocephalic and atraumatic. Right Ear: External ear normal.      Left Ear: External ear normal.      Nose: Nose normal.   Eyes:      General: No scleral icterus. Right eye: No discharge. Left eye: No discharge. Conjunctiva/sclera: Conjunctivae normal.   Neck:      Musculoskeletal: Normal range of motion. Cardiovascular:      Rate and Rhythm: Normal rate and regular rhythm. Heart sounds: Normal heart sounds. No murmur. Pulmonary:      Effort: Pulmonary effort is normal.   Chest:       Abdominal:      Palpations: Abdomen is soft. Tenderness: There is no abdominal tenderness. Skin:     General: Skin is warm and dry. Findings: No erythema or rash. Neurological:      Mental Status: She is alert and oriented to person, place, and time. Psychiatric:         Behavior: Behavior normal.         Thought Content: Thought content normal.         Judgment: Judgment normal.         Assessment / Plan:   Herminia Connolly was seen today for breast mass, other and gastroesophageal reflux. Diagnoses and all orders for this visit:    Breast lump in lower inner quadrant  -     Cancel: US BREAST COMPLETE LEFT; Future  -     AUREA DIGITAL SCREEN W OR WO CAD BILATERAL; Future  Mammogram for family history of breast cancer and breast changes per patient. On exam changes in breast felt consistent bilaterally. Gastroesophageal reflux disease, unspecified whether esophagitis present  -     famotidine (PEPCID) 20 MG tablet; Take 1 tablet by mouth 2 times daily  Most likely due to morbid obesity  Will address more in depth at next visit.      Family history of breast cancer in mother -     AUREA DIGITAL SCREEN W OR WO CAD BILATERAL; Future        Pap at next visit       Social Needs   Food insecurity    Worry: Never true    Inability: Never true        Return in about 3 weeks (around 3/25/2021) for F/u on US, labs, and Pap . Medications Prescribed:  Orders Placed This Encounter   Medications    famotidine (PEPCID) 20 MG tablet     Sig: Take 1 tablet by mouth 2 times daily     Dispense:  60 tablet     Refill:  3       Future Appointments   Date Time Provider Odin Garcia   3/25/2021  9:00 AM Bridget Cardoso  [address]       Patient given educational materials - see patient instructions. Discussed use, benefit, and sideeffects of prescribed medications. All patient questions answered. Pt voiced understanding. Reviewed health maintenance. Instructed to continue current medications, diet and exercise. Patient agreed with treatment plan. Follow up as directed.      Electronically signed by Hallie Helms DO on 3/4/2021 at 11:45 AM

## 2021-03-05 ENCOUNTER — HOSPITAL ENCOUNTER (OUTPATIENT)
Dept: WOMENS IMAGING | Age: 36
Discharge: HOME | End: 2021-03-05
Payer: COMMERCIAL

## 2021-03-05 DIAGNOSIS — N63.0 BREAST LUMP: ICD-10-CM

## 2021-03-05 DIAGNOSIS — Z80.3 FAMILY HISTORY OF BREAST CANCER IN MOTHER: ICD-10-CM

## 2021-03-05 DIAGNOSIS — R73.9 HYPERGLYCEMIA: Primary | ICD-10-CM

## 2021-03-05 DIAGNOSIS — N63.0 BREAST MASS: ICD-10-CM

## 2021-03-05 DIAGNOSIS — R73.03 PREDIABETES: ICD-10-CM

## 2021-03-05 LAB
AVERAGE GLUCOSE: 129 MG/DL (ref 70–126)
HBA1C MFR BLD: 6.3 % (ref 4.4–6.4)

## 2021-03-05 PROCEDURE — G0279 TOMOSYNTHESIS, MAMMO: HCPCS

## 2021-03-05 PROCEDURE — 76642 ULTRASOUND BREAST LIMITED: CPT

## 2021-03-24 PROBLEM — N63.0 BREAST LUMP: Status: RESOLVED | Noted: 2019-05-03 | Resolved: 2021-03-24

## 2021-04-26 ENCOUNTER — OFFICE VISIT (OUTPATIENT)
Dept: FAMILY MEDICINE CLINIC | Age: 36
End: 2021-04-26
Payer: COMMERCIAL

## 2021-04-26 VITALS
HEART RATE: 88 BPM | BODY MASS INDEX: 51.9 KG/M2 | RESPIRATION RATE: 18 BRPM | SYSTOLIC BLOOD PRESSURE: 150 MMHG | WEIGHT: 293 LBS | OXYGEN SATURATION: 97 % | DIASTOLIC BLOOD PRESSURE: 84 MMHG

## 2021-04-26 DIAGNOSIS — R10.30 LOWER ABDOMINAL PAIN: Primary | ICD-10-CM

## 2021-04-26 DIAGNOSIS — I10 ESSENTIAL HYPERTENSION: ICD-10-CM

## 2021-04-26 DIAGNOSIS — T14.8XXA MUSCLE STRAIN: ICD-10-CM

## 2021-04-26 LAB
BILIRUBIN, POC: NEGATIVE
BLOOD URINE, POC: NEGATIVE
CLARITY, POC: NORMAL
COLOR, POC: NORMAL
GLUCOSE URINE, POC: NEGATIVE
HCG, URINE, POC: NEGATIVE
KETONES, POC: NEGATIVE
LEUKOCYTE EST, POC: NEGATIVE
Lab: NORMAL
NEGATIVE QC PASS/FAIL: NORMAL
NITRITE, POC: NEGATIVE
PH, POC: 6
POSITIVE QC PASS/FAIL: NORMAL
PROTEIN, POC: NEGATIVE
SPECIFIC GRAVITY, POC: 1.02
UROBILINOGEN, POC: 0.2

## 2021-04-26 PROCEDURE — 81003 URINALYSIS AUTO W/O SCOPE: CPT | Performed by: NURSE PRACTITIONER

## 2021-04-26 PROCEDURE — 81025 URINE PREGNANCY TEST: CPT | Performed by: NURSE PRACTITIONER

## 2021-04-26 PROCEDURE — 99203 OFFICE O/P NEW LOW 30 MIN: CPT | Performed by: NURSE PRACTITIONER

## 2021-04-26 RX ORDER — NAPROXEN 500 MG/1
500 TABLET ORAL 2 TIMES DAILY WITH MEALS
Qty: 60 TABLET | Refills: 0 | Status: SHIPPED | OUTPATIENT
Start: 2021-04-26 | End: 2021-07-19 | Stop reason: ALTCHOICE

## 2021-04-26 RX ORDER — ACETAMINOPHEN 500 MG
500 TABLET ORAL EVERY 6 HOURS PRN
COMMUNITY
End: 2021-07-19 | Stop reason: ALTCHOICE

## 2021-04-26 RX ORDER — IBUPROFEN 200 MG
400 TABLET ORAL EVERY 6 HOURS PRN
COMMUNITY
End: 2021-10-25

## 2021-04-26 RX ORDER — TIZANIDINE 2 MG/1
2 TABLET ORAL 3 TIMES DAILY PRN
Qty: 30 TABLET | Refills: 0 | Status: SHIPPED | OUTPATIENT
Start: 2021-04-26 | End: 2021-07-19 | Stop reason: ALTCHOICE

## 2021-04-26 NOTE — PROGRESS NOTES
Boy Ashley (:  1985) is a 38 y.o. female,Established patient, here for evaluation of the following chief complaint(s):  Lower Back Pain (lower left side ), Back Pain (right upper side- when takes a deep breath ), and Abdominal Pain (lower-)      ASSESSMENT/PLAN:  1. Lower abdominal pain  -     POCT Urinalysis No Micro (Auto)  -     POC Pregnancy Urine Qual  2. Essential hypertension  3. Muscle strain  -     tiZANidine (ZANAFLEX) 2 MG tablet; Take 1 tablet by mouth 3 times daily as needed (muscle strain), Disp-30 tablet, R-0Normal  -     naproxen (NAPROSYN) 500 MG tablet; Take 1 tablet by mouth 2 times daily (with meals), Disp-60 tablet, R-0Normal  Pt non toxic appearing and in no acute distress. Patient requested a pregnancy test, irregular menses, negative. Exam unremarkable, despite HTN, pt states she has not taken her b/p medications yet. encouraged to do so  Abdominal pain consistent with muscle strain. See treatment plan above. Return in about 1 week (around 5/3/2021), or if symptoms worsen or fail to improve. SUBJECTIVE/OBJECTIVE:  Patient with complaints of lower back pain. Advil works, tylenol does not. Got second Covid vaccine last week Wednesday. Increased Belching. Saturday. Diarrhea this am.     Last period was . .. is irregular. . Denies any n/t           Review of Systems   Constitutional: Negative for chills, fatigue and fever. Respiratory: Negative for cough, choking and shortness of breath. Gastrointestinal: Positive for abdominal pain (lower). Musculoskeletal: Positive for back pain. Negative for neck pain and neck stiffness. Skin: Negative for color change, pallor and rash. Neurological: Negative for weakness, light-headedness, numbness and headaches. Physical Exam  Constitutional:       Appearance: Normal appearance. HENT:      Head: Normocephalic and atraumatic.       Right Ear: Ear canal and external ear normal.      Left Ear: Ear canal and external ear normal.      Nose: Nose normal. No congestion or rhinorrhea. Mouth/Throat:      Mouth: Mucous membranes are moist.      Pharynx: Oropharynx is clear. Eyes:      General:         Right eye: No discharge. Left eye: No discharge. Conjunctiva/sclera: Conjunctivae normal.   Neck:      Musculoskeletal: Normal range of motion and neck supple. Neurological:      Mental Status: She is alert. An electronic signature was used to authenticate this note.     --Sophie Clemente, CHARLIE - CNP

## 2021-04-26 NOTE — PATIENT INSTRUCTIONS
Patient Education        Muscle Strain: Care Instructions  Your Care Instructions     A muscle strain happens when you overstretch, or pull, a muscle. It can happen when you exercise or lift something or when you have an accident. Rest and other home care can help the muscle heal.  Follow-up care is a key part of your treatment and safety. Be sure to make and go to all appointments, and call your doctor if you are having problems. It's also a good idea to know your test results and keep a list of the medicines you take. How can you care for yourself at home? · Rest the strained muscle. Do not put weight on it for a day or two. If your doctor advises you to, use crutches or a sling to rest a sore limb. · Put ice or a cold pack on the sore muscle for 10 to 20 minutes at a time to stop swelling. Put a thin cloth between the ice pack and your skin. · Prop up the sore arm or leg on a pillow when you ice it or anytime you sit or lie down during the next 3 days. Try to keep it above the level of your heart. This will help reduce swelling. · Take pain medicines exactly as directed. ? If the doctor gave you a prescription medicine for pain, take it as prescribed. ? If you are not taking a prescription pain medicine, ask your doctor if you can take an over-the-counter medicine. · Do not do anything that makes the pain worse. Return to exercise gradually as you feel better. When should you call for help? Call your doctor now or seek immediate medical care if:    · You have new severe pain.     · Your injured limb is cool or pale or changes color.     · You have tingling, weakness, or numbness in your injured limb.     · You cannot move the injured area. Watch closely for changes in your health, and be sure to contact your doctor if:    · You cannot put weight on a joint, or it feels unsteady when you walk.     · Pain and swelling get worse or do not start to get better after 2 days of home treatment.    Where can

## 2021-05-03 ASSESSMENT — ENCOUNTER SYMPTOMS
SHORTNESS OF BREATH: 0
ABDOMINAL PAIN: 1
CHOKING: 0
COUGH: 0
BACK PAIN: 1
COLOR CHANGE: 0

## 2021-05-07 NOTE — PROGRESS NOTES
DIGITAL SCREENING MAMMOGRAM WITH CAD, 5/7/2021



CLINICAL INFORMATION / INDICATION: Routine screening mammography. SCREENING MAMMOGRAM



TECHNIQUE:  Digital bilateral 2D mammography was obtained in the craniocaudal and mediolateral obliqu
e projections. This examination was interpreted with the benefit of Computer-Aided Detection analysis
.



COMPARISON: None



FINDINGS: 



Breast Density: There are scattered areas of fibroglandular density.



No dominant mass, suspicious calcifications, or architectural distortion in either breast. 





 

IMPRESSION: No mammographic evidence of malignancy.



Follow up recommendation: Routine yearly



BI-RADS Category 1:  Negative.





-------------------------------------------------------------------------------------------

A "normal" or negative report should not discourage follow up or biopsy of a clinically significant f
inding.



A written summary of these findings will be mailed to the patient. The patient will be entered into a
 mammography reporting system which will generate a reminder letter for the patient's next appointmen
t at the appropriate interval.



The American College of Radiology recommends yearly mammograms starting at age 40 and continuing as l
ayan as a woman is in good health.  Breast MRI is recommended for women with an approximate 20-25% or 
greater lifetime risk of breast cancer, including women with a strong family history of breast or ova
joaquín cancer or who have been treated for Hodgkin's disease.



Signer Name: Jonah Garcia MD 

Signed: 5/7/2021 8:42 AM

Workstation Name: [employer] (MULTI-VITAMINS PO), Take by mouth daily , Disp: , Rfl:     :     Review of Systems   Constitutional: Negative for chills, fatigue and fever. HENT: Negative for congestion, ear pain, postnasal drip and trouble swallowing. Eyes: Negative for pain and visual disturbance. Respiratory: Positive for shortness of breath (with exertion ). Negative for cough. Cardiovascular: Negative for chest pain and palpitations. Gastrointestinal: Negative for abdominal pain, blood in stool, constipation and diarrhea. Genitourinary: Negative for dysuria and hematuria. Skin: Negative for rash and wound. Neurological: Negative for dizziness and headaches. Psychiatric/Behavioral: The patient is not nervous/anxious. :     Vitals:    12/04/20 0819 12/04/20 0836   BP: (!) 146/80 130/84   Site: Right Upper Arm Left Upper Arm   Position: Sitting    Cuff Size: Large Adult    Pulse: 91    Resp: 12    Temp: 97.7 °F (36.5 °C)    TempSrc: Temporal    SpO2: 98%    Weight: (!) 301 lb 9.6 oz (136.8 kg)    Height: 5' 2.99\" (1.6 m)        Physical Exam  Vitals signs and nursing note reviewed. Constitutional:       General: She is not in acute distress. Appearance: She is well-developed. She is obese. She is not ill-appearing or diaphoretic. HENT:      Head: Normocephalic and atraumatic. Right Ear: External ear normal.      Left Ear: External ear normal.      Nose: Nose normal.   Eyes:      General: No scleral icterus. Right eye: No discharge. Left eye: No discharge. Conjunctiva/sclera: Conjunctivae normal.   Neck:      Musculoskeletal: Normal range of motion. Cardiovascular:      Rate and Rhythm: Normal rate and regular rhythm. Heart sounds: Normal heart sounds. No murmur. Pulmonary:      Effort: Pulmonary effort is normal.      Breath sounds: Normal breath sounds. Skin:     General: Skin is warm and dry. Findings: No erythema or rash.    Neurological:      Mental Status: She is alert and oriented to person, place, and time. Psychiatric:         Mood and Affect: Mood normal.         Behavior: Behavior normal.         Thought Content: Thought content normal.         Judgment: Judgment normal.         Assessment/Plan:   1. Morbid obesity ([address]  Will refer to dietitian, she declines bariatric surgery referral at this time. Check lipid panel before next appointment  Due to prediabetes and morbid obesity, begin metformin 500 mg. Patient was educated on adverse side effects and will call us if she develops any diarrhea or GI upset. Will order BMP for February.  - Lipid Panel; Future  - [facility] - Dietitian  - metFORMIN (GLUCOPHAGE-XR) 500 MG extended release tablet; Take 1 tablet by mouth daily (with breakfast)  Dispense: 90 tablet; Refill: 1    2. Essential hypertension  BP well controlled. Continue amlodipine 10 mg. No side effects. - amLODIPine (NORVASC) 10 MG tablet; Take 1 tablet by mouth daily  Dispense: 30 tablet; Refill: 3    3. Prediabetes  A1c 6.3% today. Begin metformin due to BMI and prediabetes  Referral to dietitian.  [facility]. [facility] - Dietitian  - metFORMIN (GLUCOPHAGE-XR) 500 MG extended release tablet; Take 1 tablet by mouth daily (with breakfast)  Dispense: 90 tablet; Refill: 1  - POCT glycosylated hemoglobin (Hb A1C)    4. Encounter for wellness examination in adult  Labs to be completed in February prior to next appointment in 3 months.  - TSH; Future  - T4, Free; Future  - Lipid Panel; Future  - CBC Auto Differential; Future  - Comprehensive Metabolic Panel; Future        Return in about 3 months (around 3/4/2021).     Orders Placed  Orders Placed This Encounter   Procedures    TSH     Standing Status:   Future     Standing Expiration Date:   12/3/2021    T4, Free     Standing Status:   Future     Standing Expiration Date:   12/3/2021    Lipid Panel     Standing Status:   Future     Standing Expiration Date:

## 2021-05-11 DIAGNOSIS — I10 ESSENTIAL HYPERTENSION: ICD-10-CM

## 2021-05-11 RX ORDER — AMLODIPINE BESYLATE 10 MG/1
10 TABLET ORAL DAILY
Qty: 30 TABLET | Refills: 3 | Status: SHIPPED | OUTPATIENT
Start: 2021-05-11 | End: 2021-10-14 | Stop reason: SDUPTHER

## 2021-05-11 NOTE — TELEPHONE ENCOUNTER
Patient's last appointment was : 3/4/2021  Patient's next appointment is : 5/18/2021  Last refilled:12/4/20

## 2021-05-11 NOTE — TELEPHONE ENCOUNTER
Chana Monae called requesting a refill on the following medications:  Requested Prescriptions     Pending Prescriptions Disp Refills    amLODIPine (NORVASC) 10 MG tablet 30 tablet 3     Sig: Take 1 tablet by mouth daily     Pharmacy verified:  [facility]   . sneha      Date of last visit: 3/4/2021  Date of next visit (if applicable): 3/10/7237

## 2021-05-17 PROBLEM — R73.01 IMPAIRED FASTING GLUCOSE: Status: ACTIVE | Noted: 2020-02-12

## 2021-05-18 ENCOUNTER — OFFICE VISIT (OUTPATIENT)
Dept: FAMILY MEDICINE CLINIC | Age: 36
End: 2021-05-18
Payer: COMMERCIAL

## 2021-05-18 VITALS
HEIGHT: 63 IN | WEIGHT: 293 LBS | RESPIRATION RATE: 16 BRPM | HEART RATE: 77 BPM | SYSTOLIC BLOOD PRESSURE: 148 MMHG | TEMPERATURE: 98 F | OXYGEN SATURATION: 98 % | BODY MASS INDEX: 51.91 KG/M2 | DIASTOLIC BLOOD PRESSURE: 86 MMHG

## 2021-05-18 DIAGNOSIS — L81.8 LABIAL MELANOTIC MACULE: ICD-10-CM

## 2021-05-18 DIAGNOSIS — N39.3 STRESS INCONTINENCE OF URINE: ICD-10-CM

## 2021-05-18 DIAGNOSIS — R77.8 ELEVATED TOTAL PROTEIN: ICD-10-CM

## 2021-05-18 DIAGNOSIS — R73.01 IMPAIRED FASTING GLUCOSE: ICD-10-CM

## 2021-05-18 DIAGNOSIS — I10 ESSENTIAL HYPERTENSION: ICD-10-CM

## 2021-05-18 DIAGNOSIS — Z12.4 CERVICAL CANCER SCREENING: Primary | ICD-10-CM

## 2021-05-18 DIAGNOSIS — E66.01 MORBID (SEVERE) OBESITY DUE TO EXCESS CALORIES (HCC): ICD-10-CM

## 2021-05-18 PROCEDURE — 99214 OFFICE O/P EST MOD 30 MIN: CPT | Performed by: STUDENT IN AN ORGANIZED HEALTH CARE EDUCATION/TRAINING PROGRAM

## 2021-05-18 PROCEDURE — 99395 PREV VISIT EST AGE 18-39: CPT | Performed by: STUDENT IN AN ORGANIZED HEALTH CARE EDUCATION/TRAINING PROGRAM

## 2021-05-18 SDOH — ECONOMIC STABILITY: FOOD INSECURITY: WITHIN THE PAST 12 MONTHS, YOU WORRIED THAT YOUR FOOD WOULD RUN OUT BEFORE YOU GOT MONEY TO BUY MORE.: NEVER TRUE

## 2021-05-18 ASSESSMENT — ENCOUNTER SYMPTOMS
COUGH: 0
BLOOD IN STOOL: 0
EYE PAIN: 0
SHORTNESS OF BREATH: 0
CONSTIPATION: 0
TROUBLE SWALLOWING: 0
DIARRHEA: 0
ABDOMINAL PAIN: 0

## 2021-05-18 ASSESSMENT — SOCIAL DETERMINANTS OF HEALTH (SDOH): HOW HARD IS IT FOR YOU TO PAY FOR THE VERY BASICS LIKE FOOD, HOUSING, MEDICAL CARE, AND HEATING?: NOT VERY HARD

## 2021-05-18 NOTE — PATIENT INSTRUCTIONS
Thank you   1. Thank you for trusting us with your healthcare needs. You may receive a survey regarding today's visit. It would help us out if you would take a few moments to provide your feedback. We value your input. 2. Please bring in ALL medications BOTTLES, including inhalers, herbal supplements, over the counter, prescribed & non-prescribed medicine. The office would like actual medication bottles and a list.   3. Please note our OFFICE POLICIES:   a. Prior to getting your labs drawn, please check with your insurance company for benefits and eligibility of lab services. Often, insurance companies cover certain tests for preventative visits only. It is patient's responsibility to see what is covered. b. We are unable to change a diagnosis after the test has been performed. c. Lab orders will not be re-printed. Please hold onto your original lab orders and take them to your lab to be completed. d. If you no show your scheduled appointment three times, you will be dismissed from this practice. e. Vianne Stapler must be completed 24 hours prior to your schedule appointment. 4. If the list below has been completed, PLEASE FAX RECORDS TO OUR OFFICE @ [phone].  Once the records have been received we will update your records at our office:  Health Maintenance Due   Topic Date Due    Hepatitis C screen  Never done    Varicella vaccine (1 of 2 - 2-dose childhood series) Never done    COVID-19 Vaccine (1) Never done       Encourage compliance with taking blood pressure medication  Call update with blood pressures in 2 weeks  Refer to gynecology at this time for labial melanotic macule  Repeat CMP due to elevated total protein  Encouraged weight loss for both irregular periods and urinary incontinence and overall health  Continue diet and lifestyle modifications, exercise (30 minutes 5 times a week) for optimal blood pressure control and cardiovascular risk reduction  Encouraged 500-calorie deficit, and tracking of calories   Follow-up in 3 months    Preventative Medicine   Encourage COVID vaccine  Pap today

## 2021-05-18 NOTE — PROGRESS NOTES
Health Maintenance Due   Topic Date Due    Hepatitis C screen  Never done    Varicella vaccine (1 of 2 - 2-dose childhood series) Never done    COVID-19 Vaccine (1) Never done

## 2021-05-18 NOTE — PROGRESS NOTES
SUBJECTIVE     Alfonza Primrose is a 38 y. o.female    Chief Complaint   Patient presents with    2 Week Follow-Up     Pap       Chief complaint, [place], and all pertinent details of the case reviewed with the resident. Please see resident's note for specific details discussed at today's visit. Patient Active Problem List   Diagnosis    Impaired fasting glucose    Morbid (severe) obesity due to excess calories (HCC)    Essential hypertension       Current Outpatient Medications   Medication Sig Dispense Refill    amLODIPine (NORVASC) 10 MG tablet Take 1 tablet by mouth daily 30 tablet 3    acetaminophen (TYLENOL) 500 MG tablet Take 500 mg by mouth every 6 hours as needed for Pain      ibuprofen (ADVIL;MOTRIN) 200 MG tablet Take 400 mg by mouth every 6 hours as needed for Pain      tiZANidine (ZANAFLEX) 2 MG tablet Take 1 tablet by mouth 3 times daily as needed (muscle strain) 30 tablet 0    naproxen (NAPROSYN) 500 MG tablet Take 1 tablet by mouth 2 times daily (with meals) 60 tablet 0    famotidine (PEPCID) 20 MG tablet Take 1 tablet by mouth 2 times daily 60 tablet 3    metFORMIN (GLUCOPHAGE-XR) 500 MG extended release tablet Take 1 tablet by mouth daily (with breakfast) 90 tablet 1    Multiple Vitamin (MULTI-VITAMINS PO) Take by mouth daily        No current facility-administered medications for this visit. Review of Systems per Dr. Annia Leiva     BP (!) 148/86 (Site: Right Upper Arm, Position: Sitting, Cuff Size: Large Adult)   Pulse 77   Temp 98 °F (36.7 °C) (Oral)   Resp 16   Ht 5' 3\" (1.6 m)   Wt 294 lb 9.6 oz (133.6 kg)   LMP 04/25/2021   SpO2 98%   BMI 52.19 kg/m²   BP Readings from Last 3 Encounters:   05/18/21 (!) 148/86   04/26/21 (!) 150/84   03/04/21 138/78       Wt Readings from Last 3 Encounters:   05/18/21 294 lb 9.6 oz (133.6 kg)   04/26/21 293 lb (132.9 kg)   03/04/21 299 lb 9.6 oz (135.9 kg)     Body mass index is 52.19 kg/m².     Physical Exam Vitals and nursing note reviewed. Constitutional:       General: She is not in acute distress. Appearance: Normal appearance. She is normal weight. She is not ill-appearing, toxic-appearing or diaphoretic. HENT:      Head: Normocephalic and atraumatic. Nose: Nose normal.   Eyes:      General: No scleral icterus. Right eye: No discharge. Left eye: No discharge. Extraocular Movements: Extraocular movements intact. Conjunctiva/sclera: Conjunctivae normal.      Pupils: Pupils are equal, round, and reactive to light. Neck:      Vascular: No carotid bruit. Cardiovascular:      Rate and Rhythm: Normal rate and regular rhythm. Heart sounds: Normal heart sounds. No murmur heard. No friction rub. No gallop. Pulmonary:      Effort: Pulmonary effort is normal. No respiratory distress. Breath sounds: Normal breath sounds. No stridor. No wheezing, rhonchi or rales. Abdominal:      General: Abdomen is flat. Bowel sounds are normal. There is no distension. Palpations: Abdomen is soft. There is no mass. Tenderness: There is no abdominal tenderness. There is no guarding or rebound. Hernia: No hernia is present. Musculoskeletal:         General: No swelling or signs of injury. Normal range of motion. Cervical back: Normal range of motion. Right lower leg: No edema. Left lower leg: No edema. Skin:     General: Skin is warm and dry. Coloration: Skin is not jaundiced or pale. Findings: No bruising, erythema, lesion or rash. Neurological:      General: No focal deficit present. Mental Status: She is alert and oriented to person, place, and time. Psychiatric:         Mood and Affect: Mood normal.         Behavior: Behavior normal.         Thought Content: Thought content normal.         Judgment: Judgment normal.         No results found for this visit on 05/18/21.     Lab Results   Component Value Date    LABA1C 6.3 03/05/2021 Lab Results   Component Value Date    CHOL 194 03/04/2021    TRIG 107 03/04/2021    HDL 44 03/04/2021    LDLCALC 129 03/04/2021       The ASCVD Risk score (Sarita Medina, et al., 2013) failed to calculate for the following reasons:     The 2013 ASCVD risk score is only valid for ages 36 to 78    Lab Results   Component Value Date     03/04/2021    K 3.9 03/04/2021     03/04/2021    CO2 26 03/04/2021    BUN 15 03/04/2021    CREATININE 0.6 03/04/2021    GLUCOSE 114 (H) 03/04/2021    CALCIUM 9.5 03/04/2021    PROT 8.5 (H) 03/04/2021    LABALBU 4.3 03/04/2021    BILITOT 0.3 03/04/2021    ALKPHOS 68 03/04/2021    AST 22 03/04/2021    ALT 27 03/04/2021    LABGLOM >90 03/04/2021    GFRAA >60 05/10/2019       No results found for: LABMICR, [id number]    Lab Results   Component Value Date    TSH 1.730 03/04/2021    T4FREE 1.31 03/04/2021       Lab Results   Component Value Date    WBC 9.5 03/04/2021    HGB 14.1 03/04/2021    HCT 45.2 03/04/2021    MCV 89.2 03/04/2021     03/04/2021           Immunization History   Administered Date(s) Administered    COVID-19, Moderna, PF, 100mcg/0.5mL 03/24/2021, 04/21/2021    Tdap (Boostrix, Adacel) 07/16/2020       Health Maintenance   Topic Date Due    Hepatitis C screen  Never done    Varicella vaccine (1 of 2 - 2-dose childhood series) Never done    Flu vaccine (Season Ended) 09/01/2021    Potassium monitoring  03/04/2022    Creatinine monitoring  03/04/2022    A1C test (Diabetic or Prediabetic)  03/05/2022    Breast cancer screen  10/27/2023    Cervical cancer screen  05/18/2026    DTaP/Tdap/Td vaccine (2 - Td) 07/16/2030    COVID-19 Vaccine  Completed    HIV screen  Completed    Hepatitis A vaccine  Aged Out    Hepatitis B vaccine  Aged Out    Hib vaccine  Aged Out    Meningococcal (ACWY) vaccine  Aged Out    Pneumococcal 0-64 years Vaccine  Aged Aashish       Future Appointments   Date Time Provider Odin Garcia   9/23/2021  1:00 PM XAVIER's DO [facility] [place] [address]       ASSESSMENT       Diagnosis Orders   1. Cervical cancer screening  [place] OBTAINING SCREEN PAP SMEAR    PAP SMEAR   2. Morbid (severe) obesity due to excess calories ([address]     3. Impaired fasting glucose  Hemoglobin A1C    Microalbumin / Creatinine Urine Ratio   4. Essential hypertension     5. Elevated total protein  Hepatic Function Panel   6. Labial melanotic macule  Jan Carpio, DO, Obstetrics & Gynecology, [facility] [address]   7. Stress incontinence of urine  Jan Carpio, DO, Obstetrics & Gynecology, [facility]      After discussion with pt and Dr. Jada Ribeiro, we agreed on plan as follows:    Refer to OB/GYN for evaluation of vaginal lesion, as well as for Pap smear. Take amlodipine regularlydrop BPs off in 2 weeks  Check HG A1c, HFP, and consider POCT spot MA in September 2021  Follow-up in 4 months for reevaluation  Will address care gaps at future visit              Attending Physician Statement  I have discussed the case, including pertinent history and exam findings with the resident. I also have seen the patient and performed key portions of the examination. I agree with the documented assessment and plan as documented by the resident.   GC modifier added to this encounter     Electronically signed by Giacomo Franco MD on 5/18/2021 at 5:01 PM

## 2021-05-18 NOTE — PROGRESS NOTES
Wendi Yuen is a 38 y.o. female who presents today for:  Chief Complaint   Patient presents with    2 Week Follow-Up     Pap     HPI:   Wendi Yuen is 38 y.o. who presents today for pap smear and follow up on labs. A1c 6.3%, CMP elevated glucose 114, total protein increased 8.5, CBC wnl, lipids wnl but slightly elevated, TFT wnl. Here for Pap smear. Reports having periods irregularly. Bleeding 1 to 4 days every 1 to 3 months. Not on birth control at this time. Having incontinence of urine with coughing and sneezing. Has modified diet and cut out sugars, pop. Weight loss of 5 pounds since March 2021.     Objective:     Vitals:    05/18/21 0805 05/18/21 0817   BP: (!) 150/92 (!) 148/86   Site: Left Upper Arm Right Upper Arm   Position: Sitting Sitting   Cuff Size: Large Adult Large Adult   Pulse: 77    Resp: 16    Temp: 98 °F (36.7 °C)    TempSrc: Oral    SpO2: 98%    Weight: 294 lb 9.6 oz (133.6 kg)    Height: 5' 3\" (1.6 m)        Wt Readings from Last 3 Encounters:   05/18/21 294 lb 9.6 oz (133.6 kg)   04/26/21 293 lb (132.9 kg)   03/04/21 299 lb 9.6 oz (135.9 kg)     Weight has decreased 5 # since     BP Readings from Last 3 Encounters:   05/18/21 (!) 148/86   04/26/21 (!) 150/84   03/04/21 138/78       Lab Results   Component Value Date    WBC 9.5 03/04/2021    HGB 14.1 03/04/2021    HCT 45.2 03/04/2021    MCV 89.2 03/04/2021     03/04/2021     Lab Results   Component Value Date     03/04/2021    K 3.9 03/04/2021     03/04/2021    CO2 26 03/04/2021    BUN 15 03/04/2021    CREATININE 0.6 03/04/2021    GLUCOSE 114 (H) 03/04/2021    CALCIUM 9.5 03/04/2021    PROT 8.5 (H) 03/04/2021    LABALBU 4.3 03/04/2021    BILITOT 0.3 03/04/2021    ALKPHOS 68 03/04/2021    AST 22 03/04/2021    ALT 27 03/04/2021    LABGLOM >90 03/04/2021    GFRAA >60 05/10/2019     Lab Results   Component Value Date    TSH 1.730 03/04/2021     Lab Results   Component Value Date    LABA1C 6.3 Preventative Medicine   Pap today     Specialists:        Return in about 4 months (around 9/18/2021) for HTN, obesity . Medications Prescribed:  No orders of the defined types were placed in this encounter. Future Appointments   Date Time Provider Odin Garcia   9/23/2021  1:00 PM Bridget Cardoso  [address]       Patient given educational materials - see patient instructions. Discussed use, benefit, and sideeffects of prescribed medications. All patient questions answered. Pt voiced understanding. Reviewed health maintenance. Instructed to continue current medications, diet and exercise. Patient agreed with treatment plan. Follow up as directed.      Electronically signed by Rachele Lopez DO on 5/18/2021 at 9:46 AM

## 2021-05-25 LAB — CYTOLOGY THIN PREP PAP: NORMAL

## 2021-07-19 ENCOUNTER — OFFICE VISIT (OUTPATIENT)
Dept: FAMILY MEDICINE CLINIC | Age: 36
End: 2021-07-19
Payer: COMMERCIAL

## 2021-07-19 VITALS
TEMPERATURE: 98.5 F | RESPIRATION RATE: 16 BRPM | BODY MASS INDEX: 51.91 KG/M2 | DIASTOLIC BLOOD PRESSURE: 96 MMHG | SYSTOLIC BLOOD PRESSURE: 162 MMHG | HEIGHT: 63 IN | HEART RATE: 85 BPM | OXYGEN SATURATION: 98 % | WEIGHT: 293 LBS

## 2021-07-19 DIAGNOSIS — F41.9 ANXIETY: Primary | ICD-10-CM

## 2021-07-19 DIAGNOSIS — R42 INTERMITTENT LIGHTHEADEDNESS: ICD-10-CM

## 2021-07-19 DIAGNOSIS — K21.9 GASTROESOPHAGEAL REFLUX DISEASE, UNSPECIFIED WHETHER ESOPHAGITIS PRESENT: ICD-10-CM

## 2021-07-19 DIAGNOSIS — R73.03 PREDIABETES: ICD-10-CM

## 2021-07-19 LAB — HBA1C MFR BLD: 6.3 % (ref 4.3–5.7)

## 2021-07-19 PROCEDURE — 83036 HEMOGLOBIN GLYCOSYLATED A1C: CPT | Performed by: STUDENT IN AN ORGANIZED HEALTH CARE EDUCATION/TRAINING PROGRAM

## 2021-07-19 PROCEDURE — 99214 OFFICE O/P EST MOD 30 MIN: CPT | Performed by: STUDENT IN AN ORGANIZED HEALTH CARE EDUCATION/TRAINING PROGRAM

## 2021-07-19 PROCEDURE — 93000 ELECTROCARDIOGRAM COMPLETE: CPT | Performed by: STUDENT IN AN ORGANIZED HEALTH CARE EDUCATION/TRAINING PROGRAM

## 2021-07-19 RX ORDER — MECLIZINE HCL 12.5 MG/1
12.5 TABLET ORAL 3 TIMES DAILY PRN
Qty: 21 TABLET | Refills: 0 | Status: SHIPPED | OUTPATIENT
Start: 2021-07-19 | End: 2021-11-24 | Stop reason: SDUPTHER

## 2021-07-19 RX ORDER — OMEPRAZOLE 20 MG/1
20 CAPSULE, DELAYED RELEASE ORAL
Qty: 30 CAPSULE | Refills: 1 | Status: SHIPPED | OUTPATIENT
Start: 2021-07-19 | End: 2021-07-26

## 2021-07-19 ASSESSMENT — ENCOUNTER SYMPTOMS
VOMITING: 0
EYE PAIN: 0
NAUSEA: 0
BLOOD IN STOOL: 0
ABDOMINAL PAIN: 0
SHORTNESS OF BREATH: 0
TROUBLE SWALLOWING: 0
COUGH: 0
CONSTIPATION: 0
DIARRHEA: 0

## 2021-07-19 NOTE — LETTER
[address] [place] [address],[address] [id number]  Phone: [phone]  Fax: [phone]    Karissa Ingram MD        July 19, 2021     Patient: Hernan Rodriguez   YOB: 1985   Date of Visit: 7/19/2021       To Whom it May Concern:    Hernan Rodriguez was seen in my clinic on 7/19/2021. She may return to work on 7/21/2021. If you have any questions or concerns, please don't hesitate to call.     Sincerely,           Karissa Ingram MD

## 2021-07-19 NOTE — PATIENT INSTRUCTIONS
embargo, aumenta el riesgo de ataque al corazón, ataque cerebral, y daños en los riñones o en los ojos. A mayor presión arterial, mayor riesgo. Santos médico le dará andry meta para santos presión arterial. Santos meta se basará en santos allyssa y santos edad. Los cambios de estilo de luisito, ace alimentarse en forma saludable y [place], siempre son importantes para ayudarle a bajar la presión arterial. También podría torey medicamentos para lograr santos meta para la presión arterial.  La atención de seguimiento es andry parte clave de santos tratamiento y seguridad. Asegúrese de hacer y acudir a todas las citas, y llame a santos médico si está teniendo problemas. También es andry buena idea saber los resultados de amor exámenes y mantener andry lista de los medicamentos que roselia. ¿Cómo puede cuidarse en el hogar? Tratamiento médico  · Si [place] de torey amor medicamentos, santos presión arterial volverá a subir. Es posible que deba torey un tipo de [place], o más de [place], para reducir la presión arterial. Sea zafar con los medicamentos. [place] santos medicamento exactamente ace se lo hayan indicado. Llame a santso médico si nichelle estar teniendo problemas con santos medicamento. · Hable con santos médico antes de empezar a torey [employer]. La aspirina puede ayudar a determinadas personas a reducir santos riesgo de tener un ataque cardíaco o un ataque cerebral. Beth torey aspirina no es adecuado para todo el TRENGEREID, debido a que puede causar sangrado grave. · Visite a santos médico periódicamente. Usted podría necesitar consultar a santos médico con más frecuencia al principio o hasta que se reduzca santos presión arterial.  · Si usted está tomando medicamentos para la presión arterial, hable con santos médico antes de torey medicamentos descongestionantes o antiinflamatorios, ace ibuprofeno. Algunos de estos medicamentos pueden elevar la presión arterial.  · Aprenda a revisarse la presión arterial en santos hogar. Cambios de estilo de luisito  · Mantenga un peso saludable. [place] es especialmente importante si aumenta de peso alrededor de la cintura. Adelgazar incluso 10 libras (5 kg) puede ayudarle a bajar la presión arterial.  · Si santos médico lo recomienda, dotty más ejercicio. Caminar es andry buena opción. Poco a poco, aumente la cantidad que [employer]. Trate de hacer al menos 30 minutos de ejercicio la mayoría de los días de la [place]. También podría desear nadar, montar en bicicleta o hacer otras actividades. · Evite o limite el alcohol. Hable con santos médico acerca de si puede o no beber alcohol. · Trate de limitar la cantidad de sodio que consume a menos de 2,300 miligramos (mg) al día. Santos médico podría indicarle que trate de ingerir menos de 1,500 mg al día. · Coma muchas frutas (ace plátanos y naranjas), verduras, legumbres, cereales integrales y productos lácteos bajos en grasa. · Reduzca la cantidad de grasas saturadas en santos dieta. La grasa saturada se encuentra en productos [address], el queso y la carne. Limitar estos alimentos puede ayudarle a bajar de [place] y a reducir también santos riesgo de enfermedad cardíaca. · No fume. El hábito de fumar aumenta santos riesgo de ataque cardíaco y cerebral. Si necesita ayuda para dejar de fumar, hable con santos médico sobre programas y medicamentos para dejar de fumar. Estos pueden aumentar amor probabilidades de dejar de fumar para siempre. ¿Cuándo debe pedir ayuda? Llame al 911  en cualquier momento que considere que necesita atención de [place]. [place] puede significar que tiene síntomas que sugieren que santos presión arterial le está causando un problema cardíaco o vascular grave. Santos presión arterial podría ser superior a 180/120. Por ejemplo, llame al 911 si:    · Tiene síntomas de un ataque al corazón. Estos pueden incluir:  ? Chadwick Amel en el pecho, o andry sensación extraña en el pecho.  ? Sudoración. ? Falta de aire. ? Náuseas o vómito.   ? Dolor, presión o andry sensación extraña en la espalda, el sherice, la mandíbula, la parte superior del abdomen o en annabel o ambos hombros o brazos. ? Aturdimiento o debilidad repentina. ? Latidos del corazón rápidos o irregulares.     · Tiene síntomas de un ataque cerebral. Estos pueden incluir:  ? Entumecimiento, hormigueo, debilidad o parálisis repentinos en la sukh, el brazo o la pierna, sobre todo en un solo lado del cuerpo. ? Cambios repentinos en la visión. ? Dificultades repentinas para hablar. ? Confusión repentina o dificultad súbita para comprender frases sencillas. ? Problemas repentinos para caminar o mantener el equilibrio. ? Dolor de [place] intenso y repentino, distinto de los gio de corazon anteriores.     · Tiene un dolor intenso en la espalda o el abdomen. No espere a que la presión arterial baje por sí nitin. Obtenga ayuda de inmediato. Llame a santos médico ahora mismo o busque atención de inmediato si:    · Tiene la presión arterial mucho más cindy de lo normal (ace 180/120 o más cindy), yarelis no tiene síntomas.     · Kacie que la presión arterial cindy le está causando síntomas, tales ace:  ? Dolor de corazon intenso. ? [place]. Preste especial atención a los cambios en santos allyssa y asegúrese de comunicarse con santos médico si:    · Santos presión arterial está más cindy de lo que santos médico recomienda al [employer] en al menos 2 ocasiones. [place] significa que el número de [place] es más alto o [address] de abajo es más alto, o ambas cosas.     · Kacie que podría estar teniendo efectos secundarios de los medicamentos para la presión arterial.   ¿Dónde puede encontrar más información en inglés? Elhua Bernadine a [email or web address] e ingrese a santos cuenta de MyChart. Jonny Downey en el [place] \"Search Health Information\" para más información (en inglés) sobre \"Presión arterial cindy: Instrucciones de cuidado. \"     Si no tiene andry cuenta, dotty clic en el enlace \"Sign Up Now\".   Revisado: 31 agosto, 2020               Versión del contenido: 12.9  © 8028-2130 [employer]. Las instrucciones de cuidado fueron adaptadas bajo licencia por [facility] ([facility]). Si usted tiene [place] afección médica o sobre estas instrucciones, siempre pregunte a santos profesional de allyssa. Healthwise, Incorporated niega toda garantía o responsabilidad por santos uso de esta información. Patient Education        Jewelene Solo de presión arterial: Acerca de esta prueba  Home Blood Pressure Test: About This Test  ¿Qué es andry prueba casera de presión arterial?     La prueba casera de presión arterial le permite que se dotty un seguimiento en casa a la presión arterial. La presión arterial es andry medida de la fuerza que ejerce la richard contra las zhu de las arterias. Las lecturas de la presión arterial [employer], ace 130/80 (se primitivo \"130 sobre 80\"). El primer número indica la presión sistólica. El kvng número indica la presión diastólica. ¿Por qué se hace esta prueba? Puede hacerse esta prueba en casa para:  · Averiguar si tiene la presión arterial cindy. · Llevar el registro de santos presión arterial si la tiene cindy. · Revisar si le está funcionando joon el medicamento para reducir la presión arterial.  · Revisar si los cambios en santos estilo de luisito, ace el ejercicio y la pérdida de [place], están afectando santos presión arterial.  ¿Cómo se prepara usted para la prueba? Por al menos 30 minutos antes de tomarse la presión arterial, no dotty ejercicio, no kia cafeína ni fume. Vacíe la vejiga antes de la prueba. Siéntese tranquilo con la espalda erguida y ambos pies apoyados en el piso por al menos 5 minutos. [place] le ayuda a tomarse la presión arterial al [place] se siente cómodo y relajado. ¿Cómo se hace la prueba? · Si santos médico lo recomienda, tómese la presión arterial dos veces al día. Tómesela por la mañana y por la tarde.   · Siéntese con el brazo ligeramente doblado y apoyado cómodamente en andry cunha de forma que la parte superior del brazo esté al Brodie Hannifin del corazón. · Use el mismo brazo cada vez que se tome la presión arterial.  · Colóquese el manguito del medidor de presión arterial directamente sobre la piel de la parte superior del brazo. Es posible que tenga que arremangarse, sacar el brazo de la manga o sacarse la camisa. · Enrolle el brazalete o manguito del medidor, o tensiómetro, alrededor de la parte superior del brazo de modo que el borde inferior esté aproximadamente a 1 pulgada (2.5 cm) por encima de donde dobla el codo. · No se mueva, hable ni envíe mensajes de texto mientras se tome la presión arterial.  Siga las instrucciones que vienen con santos medidor de presión arterial. Podrían ser diferentes de las siguientes. · Presione el botón de encendido y apagado del medidor automático. Luego es posible que tenga que esperar hasta que la pantalla le diga que el medidor está listo. · Presione el botón de inicio. El manguito se inflará y desinflará solo. · Nadya cifras de presión arterial aparecerán en la pantalla. · Espere un minuto y vuelva a tomarse la presión arterial.  · Si santos medidor no guarda automáticamente nadya cifras, anótelas en santos cuaderno de mediciones, junto con la fecha y la hora. La atención de seguimiento es andry parte clave de santos tratamiento y seguridad. Asegúrese de hacer y acudir a todas las citas, y llame a santos médico si está teniendo problemas. También es andry buena idea saber los resultados de los exámenes y mantener andry lista de los medicamentos que roselia. ¿Dónde puede encontrar más información en inglés? Bridgette Shepard a [email or web address] e ingrese a santos cuenta de MyChart. Valarie Mckenzie P110 en el [place] \"Search Health Information\" para más información (en inglés) sobre \"Prueba casera de presión arterial: Acerca de esta prueba. \"     Si no tiene andry cuenta, dotty elder en el enlace \"Sign Up Now\". Revisado: 31 agosto, 2020               Versión del contenido: 12.9  © 6401-9910 Healthwise, Incorporated.    Las instrucciones de cuidado fueron adaptadas bajo licencia por [facility] ([facility]). Si usted tiene [place] afección médica o sobre estas instrucciones, siempre pregunte a santos profesional de allyssa. [facility] niega toda garantía o responsabilidad por santos uso de esta información. Patient Education        Dieta baja en sodio (2,000 miligramos): Instrucciones de cuidado  Low Sodium Diet (2,000 Milligram): Care Instructions  Generalidades     Limitar el sodio puede ser andry parte importante del manejo de algunos problemas de [place]. La magdalena más común de sodio es la sal. Las personas obtienen la mayor parte de la sal en santos dieta de los alimentos enlatados, preparados y de paquete. La comida rápida y los platillos de restaurante también tienen niveles muy altos de [place]. Es probable que santos médico le limite el sodio a menos de 2,000 miligramos (mg) al día. Terra límite tiene en cuenta todo el sodio presente en los alimentos preparados y de [employer] force, y toda la sal que añada a amor alimentos. La atención de seguimiento es andry parte clave de santos tratamiento y seguridad. Asegúrese de hacer y acudir a todas las citas, y llame a santos médico si está teniendo problemas. También es andry buena idea saber los resultados de amor exámenes y mantener andry lista de los medicamentos que roselia. ¿Cómo puede cuidarse en el hogar? Yamila las etiquetas de los alimentos  · Yamila las etiquetas de las latas y los alimentos de paquete. [employer] qué cantidad de sodio (\"sodium\") hay en cada porción. Asegúrese de fijarse en el tamaño de la porción. Si usted come [employer] porción, consumirá [employer]. · Las etiquetas de los alimentos también indican el Porcentaje del Valor Diario (\"Percent Daily Value\") recomendado para el sodio. Escoja productos con un bajo Porcentaje del Valor Diario de [place].   · Tenga en cuenta que el sodio puede venir en otras formas además de la sal, entre las que se incluyen el glutamato monosódico (MSG, por amor siglas en inglés), el citrato de sodio y el bicarbonato de [place]. La comida asiática frecuentemente contiene MSG añadido. Si sale a comer, a veces puede pedir que no le pongan MSG ni sal a amor alimentos. Compre alimentos bajos en sodio  · Compre alimentos que indiquen en la etiqueta \"unsalted\" (\"sin sal\" [sin sal añadida]), \"sodium-free\" (\"sin sodio\" [menos de 5 mg de sodio por porción]) o \"low-sodium\" (\"bajo contenido de sodio\" [140 mg o menos de sodio por porción]). Los alimentos etiquetados ace \"reduced-sodium\" (\"sodio reducido\") y \"light sodium\" (\"ligero en sodio\") aún pueden tener demasiado sodio. Asegúrese de leer la etiqueta para saber cuánto sodio está ingiriendo. · Compre verduras frescas o congeladas que no tengan salsas [id number]. Compre las versiones de bajo sodio de verduras [place]la[place], sopas y otros productos enlatados. Prepare comidas bajas en sodio  · Reduzca la cantidad de sal que [place] para cocinar. [place] le ayudará a acostumbrarse al [employer]. No añada deshawn después de venice cocinado. Mya cucharadita de sal contiene alrededor de 2,300 mg de sodio. · Retire el salero de la cunha. · Sazone amor alimentos con ajo, jugo de milady, [place], vinagre, hierbas y especias. No use salsa de soya, salsa de soya \"ligera\" (\"lite soy sauce\"), salsa para katelyn, sal de cebolla, sal de ajo, sal de apio ni kétchup en amor comidas. · Use aderezos para ensaladas, salsas y ketchup de bajo contenido de Branden. O prepare amor propios aderezos para ensaladas y salsas sin añadir sal.  · Use menos sal (o nada) cuando la receta lo pida. Por lo general puede añadir la mitad de la cantidad de deshawn que pide la receta sin que esta pierda el sabor. Otros alimentos ace el arroz, las pastas y los cereales no necesitan sal adicional.  · Enjuague las verduras enlatadas y cocínelas en agua fresca. [place] le nancy algo de sal, yarelis no toda.   · Evite el agua que sea naturalmente jack en sodio o que haya sido tratada con ablandadores de [place], los cuales agregan sodio. Si compra agua embotellada, jacqueline la etiqueta y elija [facility] kianna sin sodio. Evite los alimentos ricos en sodio  · Evite comer lo siguiente:  ? Alesha, pescados y aves [place], curados, salados y [place] falls. ? Nemacolin Brownie, perros calientes (\"hot dogs\") y alesha frías. ? Queso común, chantel y procesado y [place] de cacahuate (maní) común. ? Larry Rafter y otros refrigerios salados ace \"pretzels\", \"chips\" (ace osiris fritas) y palomitas de maíz saladas. ? Comidas preparadas y congeladas, a menos que tengan andry etiqueta que diga \"bajo en sodio\" (\"low-sodium\"). ? Sarah Lowry y consomés enlatados y secos o deshidratados, a menos que digan \"sin sodio\" (\"sodium-free\") o \"bajo en sodio\" (\"low-sodium\"). ? Verduras enlatadas, a menos que digan \"sin sodio\" (\"sodium-free\") o \"bajo en sodio\" (\"low-sodium\"). ? Osiris fritas (a la francesa), pizzas, tacos y otras comidas rápidas. ? Pepinillos, aceitunas, ketchup y otros condimentos, en especial la salsa de soya, a menos que digan \"sin sodio\" (\"sodium-free\") o \"bajo en sodio\" (\"low-sodium\"). ¿Dónde puede encontrar más información en inglés? Denver Dame a [email or web address] e ingrese a santos cuenta de Edwin Griffith Megan G588 en el cuadro \"Search Health Information\" para más información (en inglés) sobre \"Dieta baja en sodio (2,000 miligramos): Instrucciones de cuidado. \"     Si no tiene andry cuenta, dotty clic en el enlace \"Sign Up Now\". Revisado: 17 diciembre, 2020               Versión del contenido: 12.9  © 8247-2744 Healthwise, Incorporated. Las instrucciones de cuidado fueron adaptadas bajo licencia por [facility] ([facility]). Si usted tiene [place] afección médica o sobre estas instrucciones, siempre pregunte a santos profesional de allyssa. Healthwise, Incorporated niega toda garantía o responsabilidad por santos uso de esta información.          Patient Education        Mareos: Instrucciones de cuidado  Dizziness: Care Instructions  Instrucciones de cuidado  Los mareos son Radha Mings sensación de inestabilidad o confusión en la corazon. Son distintos al vértigo, andry sensación de que la habitación gira o de que usted se mueve o . También es distinto del aturdimiento, que es la sensación de que está a punto de desmayarse. Puede resultar difícil conocer la causa de los [place]. Algunas personas se sienten mareadas cuando tienen migrañas. A veces, los episodios gripales pueden hacer que se sienta mareado. Algunas afecciones médicas, ace los problemas cardíacos o la presión arterial cindy, pueden hacer que se sienta mareado. Muchos medicamentos pueden causar mareos, ace los que se usan para la presión arterial cindy, el dolor o la ansiedad. Si es un medicamento el que está causando los síntomas, santos médico podría recomendarle que lo cambie o deje de tomarlo. Si es un problema cardíaco, podría necesitar medicamentos para ayudar a que santos corazón funcione mejor. Si no hay razón aparente para los síntomas, santos médico podría sugerir vigilar y esperar ramesh un tiempo para darrel si los mareos desaparecen por sí solos. La atención de seguimiento es andry parte clave de santos tratamiento y seguridad. Asegúrese de hacer y acudir a todas las citas, y llame a santos médico si está teniendo problemas. También es andry buena idea saber los resultados de amor exámenes y mantener andry lista de los medicamentos que roselia. ¿Cómo puede cuidarse en el hogar? · Si santos médico le recomienda o receta medicamentos, tómelos exactamente según las indicaciones. Llame a santos médico si nichelle estar teniendo un problema con santos medicamento. · No conduzca mientras se sienta mareado. · Trate de prevenir las caídas. Algunas medidas que puede torey son:  ? Usar tapetes antideslizantes, agregar agarraderas cerca de la brionna y usar luces nocturnas. ? Ordenar santos casa de betzy manera que en los senderos no haya nada con lo que se pueda tropezar. ?  Avisarles a familiares y [employer] se ha Sprint beSUCCESS sintiendo mareado. [place] les servirá para saber cómo ayudarle. ¿Cuándo debe pedir ayuda? Llame al 911 en cualquier momento que considere que necesita atención de [place]. Por ejemplo, llame si:    · Se desmayó (perdió el conocimiento).     · Tiene mareos junto con síntomas de un ataque cardíaco. Estos pueden incluir:  ? Dolor de [place], o presión o andry sensación extraña en el pecho.  ? Sudoración. ? Falta de aire. ? Náuseas o vómito. ? Dolor, presión, o andry sensación extraña en la espalda, el sherice, la mandíbula o el abdomen superior, o en annabel o ambos hombros o brazos. ? Aturdimiento o debilidad repentina. ? Un latido cardíaco rápido o irregular.     · Tiene síntomas de un ataque cerebral. Estos pueden incluir:  ? Entumecimiento, hormigueo, debilidad o parálisis repentinos en la sukh, el brazo o la pierna, sobre todo si ocurre en un solo lado del cuerpo. ? Cambios súbitos en la vista. ? Problemas repentinos para hablar. ? Confusión súbita o dificultad repentina para comprender frases sencillas. ? Problemas repentinos para caminar o mantener el equilibrio. ? Un dolor de corazon intenso y repentino, distinto a los gio de [employer]. Llame a santos médico ahora mismo o busque atención médica inmediata si:    · Se siente mareado y tiene [place], dolor de corazon o zumbido en los oídos.     · Tiene nuevas náuseas y vómito o estos aumentan.     · Nadya mareos no desaparecen o regresan. Preste especial atención a los cambios en santos allyssa y asegúrese de comunicarse con santos médico si:    · No mejora ace se esperaba. ¿Dónde puede encontrar más información en inglés? Bridgette Shepard a [email or web address] e ingrese a santos cuenta de MyChart. Valarie Mckenzie F578 en el cuadro \"Search Health Information\" para más información (en inglés) sobre \"Mareos: Instrucciones de cuidado. \"     Si no tiene andry cuenta, dotty elder en el enlace \"Sign Up Now\".   Revisado: 19 octubre, 2020               Versión del contenido: ejercicio. No tome bebidas con alcohol o cafeína. · Esté atento a los síntomas de deshidratación. Estos incluyen:  ? Abdriashid Cumber y pegajosa. ? [place] orina. ? Ojos secos y hundidos. ? Sentirse muy cansado. · Sepa qué problemas pueden provocar deshidratación. Estos incluyen:  ? Diarrea, fiebre y vómito. ? Cualquier enfermedad con fiebre, ace la neumonía o la gripe. ? Actividades que causan mucha sudoración, ace ho de resistencia y Satish Emma yola en exteriores en un clima cálido o húmedo. ? Consumo de alcohol o drogas o problemas causados por dejar el consumo (abstinencia). ? Ciertos medicamentos, ace pastillas para resfriados y alergias (antihistamínicos), pastillas para adelgazar (diuréticos) y laxantes. ? Ciertas enfermedades, ace la diabetes, el cáncer, y la enfermedad del corazón o Henery Muster. ¿Cuándo debe pedir ayuda? Llame al 911 en cualquier momento que considere que necesita atención de [place]. Por ejemplo, llame si:    · Se desmayó (perdió el conocimiento). Llame a santos médico ahora mismo o busque atención médica inmediata si:    · Se siente confuso y no puede pensar con claridad.     · Siente mareos o aturdimiento, o que está a punto de desmayarse.     · Usted tiene señales de necesitar más líquido. Tiene los ojos hundidos, la boca seca y elimina muy poca orina.     · No puede retener líquidos en el estómago. Preste especial atención a los cambios en santos allyssa y asegúrese de comunicarse con santos médico si:    · No produce lágrimas.     · Tiene la piel muy seca y esta regresa despacio a santos lugar después de pellizcarla.     · Tiene la boca y los ojos muy secos. ¿Dónde puede encontrar más información en inglés? Bev Archer a [email or web address] e ingrese a santos cuenta de MyChart. Jason Lux C494 en el cuadro \"Search Health Information\" para más información (en inglés) sobre \"Deshidratación: Instrucciones de cuidado. \"     Si no tiene andry cuenta, dotty elder en el enlace \"Sign Up en un período de 2 a 4 horas. Debería beber, ace mínimo, 10 vasos de líquido al día para [place] [facility] líquidos perdidos. Si tiene [facility] [employer], del corazón o del hígado y tiene que [place]'s líquidos, hable con santos médico antes de aumentar santos consumo. · Prepare santos propia bebida. Mida todo con cuidado. La bebida podría no funcionar joon o incluso podría ser perjudicial si las cantidades no son correctas. ? 1 cuarto de galón de agua (0.95 litros)  ? ½ cucharadita de sal  ? 6 cucharaditas de azúcar  · No heide líquidos con cafeína, ace café o bebidas cola. · No heide nada de alcohol. Puede deshidratarlo. · Heide mucho líquido. Si tiene andry enfermedad renal, cardíaca o hepática y tiene que restringir los líquidos, hable con santos médico antes de aumentar la cantidad de líquido que erica. ¿Cuándo debe pedir ayuda? Llame al 911 en cualquier momento que considere que necesita atención de [place]. Por ejemplo, llame si:    · Tiene señales de deshidratación grave, tales ace:  ? Se siente confuso o no puede mantenerse despierto. ? Se desmayó (perdió el conocimiento). Llame a santos médico ahora mismo o busque atención médica inmediata si:    · Todavía tiene señales de deshidratación. Tiene los ojos hundidos, la boca seca y elimina muy poca [place].     · Se siente mareado o aturdido, o siente que podría desmayarse.     · No puede retener los líquidos en el estómago. Preste especial atención a los cambios en santos allyssa y asegúrese de comunicarse con santos médico si:    · No mejora ace se esperaba. ¿Dónde puede encontrar más información en inglés? Nav Cara a [email or web address] e ingrese a santos cuenta de MyChart. Uriah Nicolas H114 en el [facility] \"Search Health Information\" para más información (en inglés) sobre \"Rehidratación oral: Instrucciones de cuidado. \"     Si no tiene andry cuenta, dotty elder en el enlace \"Sign Up Now\".   Revisado: 19 octubre, 2020               Versión del contenido: 12.9  © 5871-2021 [facility], [facility]. Las instrucciones de cuidado fueron adaptadas bajo licencia por [facility] ([facility]). Si usted tiene [place] afección médica o sobre estas instrucciones, siempre pregunte a mackey profesional de allyssa. [facility] niega toda garantía o responsabilidad por mackey uso de esta información. Patient Education        Enfermedad de reflujo gastroesofágico (GERD): Instrucciones de cuidado  Gastroesophageal Reflux Disease (GERD): Care Instructions  Instrucciones de cuidado     La enfermedad por reflujo gastroesofágico (ERGE) es cuando el ácido estomacal fluye de vuelta al esófago. El esófago es el tubo que va desde la garganta al [facility]. Patrecia Brightly de andry nitin vía j carlos que el ácido estomacal fluya de vuelta por shelly tubo. Cuando usted tiene Udoccckxz-paèu-Lckkki, esta válvula no se josse herméticamente. Berino también puede causar dolor e hinchazón en el esófago (esofagitis). Si tiene síntomas leves de ERGE, ace acidez [place], es posible que pueda controlar el problema con antiácidos o medicamentos de [employer]. Cambiar la dieta y los hábitos de alimentación, ace no comer tarde por la noche, bajar de peso y hacer otros cambios en el estilo de luisito también puede ayudar a [employer]. La atención de seguimiento es andry parte clave de mackey tratamiento y seguridad. Asegúrese de hacer y acudir a todas las citas, y llame a mackey médico si está teniendo problemas. También es andry buena idea saber los resultados de amor exámenes y mantener andry lista de los medicamentos que roselia. ¿Cómo puede cuidarse en el hogar? · [employer] medicamentos exactamente ace le fueron recetados. Llame a mackey médico si nichelle estar teniendo problemas con mackey medicamento. · Mackey médico puede recomendarle medicamentos de [employer]. Para la indigestión leve u ocasional, los antiácidos, ace Tums, Gaviscon, Mylanta o Maalox, pueden ayudar.  Mackey médico también puede recomendarle inhibidores de la secreción ácida de venta markus, ace Pepcid AC (famotidina), Tagamet HB (cimetidina) o Prilosec (omeprazol). Yamila y siga todas las instrucciones de la [place]. Si utiliza estos medicamentos con frecuencia, hable con santos médico.  · Cambie nadya hábitos de alimentación. ? Es mejor comer varias comidas pequeñas en lugar de dos o barak comidas abundantes. ? Después de comer, espere de 2 a 3 horas antes de recostarse. ? El chocolate, la menta y el alcohol pueden empeorar la Uwwjzfpkz-ttèy-Csdzcz. ? En [employer], los alimentos picantes, los alimentos que tienen mucho ácido (ace los tomates y las naranjas) y el café pueden empeorar los síntomas de la Tnfpwdrex-ntèg-Rniwcb. Si nadya síntomas empeoran después de comer un determinado alimento, se recomienda que deje de comer herrera alimento para darrel si nadya síntomas mejoran. · No fume ni masque tabaco. Fumar puede agravar la ERGE. Si necesita ayuda para dejar de usar tabaco, hable con santos médico sobre programas y medicamentos para dejar de usarlo. Estos pueden aumentar nadya probabilidades de dejar el hábito para siempre. · Si tiene síntomas de Paulevyfe-nlèe-Baowgx ramesh la noche, eleve la cabecera de santos cama entre 6 y 6 pulgadas (entre 15 y 21 cm) colocando ladrillos debajo del jaden de la cama o andry cuña de espuma debajo de la cabecera del colchón. (Usar almohadas adicionales no funciona). · No use ropa ajustada alredAcceptdor Dealer Tire del cuerpo. · Baje de peso, si necesita hacerlo. Perder tan solo entre 5 y 10 libras (2.3 a 4.5 kg) puede ayudarle. ¿Cuándo debe pedir ayuda? Llame a santos médico ahora mismo o busque atención médica inmediata si:    · Tiene un dolor de estómago nuevo o distinto.     · Nadya heces son negruzcas y parecidas al alquitrán o tienen rastros de Jaime. Preste especial atención a los cambios en santos allyssa y asegúrese de comunicarse con santos médico si:    · Nadya síntomas no wilson jolanta después de 2 días.     · La comida parece quedarle atorada en la garganta o el pecho.    ¿Dónde puede encontrar más información en inglés? Radha Moody a [email or web address] e ingrese a mackey cuenta de MyChart. Axele Hiss A395 en el Jeovanny Felder \"Search Health Information\" para más información (en inglés) sobre \"Enfermedad de reflujo gastroesofágico (GERD): Instrucciones de cuidado. \"     Si no tiene andry cuenta, dotty clic en el enlace \"Sign Up Now\". Revisado: 10 febrero, 2021               Versión del contenido: 12.9  © 3277-2856 Healthwise, Incorporated. Las instrucciones de cuidado fueron adaptadas bajo licencia por [address] Si usted tiene [place] afección médica o sobre estas instrucciones, siempre pregunte a mackey profesional de allyssa. Healthwise, Incorporated niega toda garantía o responsabilidad por mackey uso de esta información. Patient Education        [facility] que son buenas justin de fibra  Learning About Foods That Are Good Sources of Fiber  ¿Qué alimentos tienen alto contenido de [place]? Los alimentos que usted come contienen nutrientes, ace vitaminas y [place]. La fibra es un nutriente. Mackey cuerpo necesita la cantidad [facility] para mantenerse mirna y funcionar ace debería. [employer] lista a continuación ace ayuda para decidir qué alimentos comer. Aquí hay algunos ejemplos de alimentos que son Yolanda Leonidas justin de [place].   Guadlupe Cadet  · Manzana  · Albaricoque (chabacano)  · Aguacate (palta)  · Plátano (banana)  · Moras  · Cerezas  · Melón  · Kingman Waller  · Tsosie Yolande  Granos  · Amaranto  · Cebada  · Cereal de salvado  · Farro  · Salvado de gely  · Gely  · Quinua  · Arroz (integral o argelia)  · Pan integral  · Gurpreet ibrahim (English muffin) integral  Alimentos ricos en proteínas  · Almendras  · Frijoles (negros, rojos, blancos, lopes)  · Semillas de chía  · Garbanzos  · Lentejas  · Semillas de calabaza  · Arvejas partidas  · Semillas de girasol  Verduras  · Alcachofa  · Brócoli  · Rio Blanco de Bruselas  · Col (repollo)  · Zanahorias  · Coliflor  · Berenjena  · Arvejas  · Col rizada  · Calabaza  · Camote (batata)  · Papa ananth  Colabore con santos médico para determinar qué cantidad de shelly nutriente necesita. Según santos allyssa, betzy vez necesite más o menos de él en santos alimentación. ¿Dónde puede encontrar más información en inglés? Rudolfo Coast a [email or web address] e ingrese a santos cuenta de MyChart. [facility] F355 en el Dewitte Vesta \"Search Health Information\" para más información (en inglés) sobre \"Aprenda sobre alimentos que son Shey Browner justin de [place]. \"     Si no tiene andry cuenta, dotty clic en el enlace \"Sign Up Now\". Revisado: 17 diciembre, 2020               Versión del contenido: 12.9  © 7510-8657 Healthwise, Incorporated. Las instrucciones de cuidado fueron adaptadas bajo licencia por [facility] ([facility]). Si usted tiene [place] afección médica o sobre estas instrucciones, siempre pregunte a santos profesional de allyssa. Healthwise, Incorporated niega toda garantía o responsabilidad por santos uso de esta información. Patient Education        Espasmo de sherice: Ejercicios  Neck Spasm: Exercises  Instrucciones de cuidado  Éstos son algunos ejemplos de ejercicios típicos de rehabilitación para santos afección. Comience cada ejercicio lentamente. Reduzca la intensidad del ejercicio si Jo Ann Keely a sentir dolor. Santos médico o el fisioterapeuta le dirán cuándo puede comenzar con estos ejercicios y cuáles funcionarán mejor para usted. Cómo se hacen los ejercicios  Estiramiento del elevador de la escápula   1. Siéntese en andry silla firme o párese derecho. 2. Incline suavemente la corazon hacia santos hombro nina. 3. Gire la corazon de modo que wero hacia la axila, [place] santos corazon ligeramente hacia adelante. Deje que el peso de la corazon estire los músculos del sherice. 4. Mantenga la posición entre 15 y 27 segundos. 5. Regrese a la posición inicial.  6. Siga las mismas instrucciones de [facility], yarelis incline la corazon hacia santos hombro derecho.   7. Repita de 2 a 4 veces hacia cada hombro. Estiramiento del trapecio superior   1. Siéntese en andry silla firme o párese derecho. 2. Terra estiramiento funciona mejor si mantiene santos hombro abajo mientras se inclina alejándose de él. Para ayudarle a que se acuerde de esto, empiece relajando los hombros y sosteniéndose ligeramente de amor muslos o de la silla. 3. Incline la corazon hacia el hombro y [place] la posición ramesh 15 a 30 segundos. Deje que el peso de la corazon estire amro músculos. 4. Si desea un poco más de estiramiento, coloque el brazo detrás de la espalda. Utilice el brazo opuesto a la dirección en la que está inclinando la corazon. Por ejemplo, si está inclinando la corazon hacia la izquierda, coloque el brazo derecho detrás de la espalda. 5. Repita de 2 a 4 veces hacia cada hombro. Rotación del sherice   1. Siéntese en andry silla firme o párese derecho. 2. Con la barbilla nivelada, gire la corazon hacia la derecha y [place] 15 a 30 segundos. 3. Gire la corazon hacia la izquierda y mantenga la posición ramesh 15 a 30 segundos. 4. Repita de 2 a 4 veces hacia cada lado. Meter la barbilla (mentón)   1. Acuéstese en el piso con andry toalla enrollada debajo de santos sherice. Santos corazon debe estar tocando [employer]. 2. Lleve lentamente la barbilla hacia la parte frontal de santos shercie. 3. Mantenga la posición ramesh andry cuenta de 6 y luego relájese ramesh un crissy de 10 segundos. 4. Repita de 8 a 12 veces. Flexión del sehrice hacia adelante   1. Siéntese en andry silla firme o párese derecho. 2. Agache la corazon hacia adelante. 3. Mantenga la posición entre 15 y 27 segundos, luego regrese a la posición inicial.  4. Repita de 2 a 4 veces. La atención de seguimiento es andry parte clave de santos tratamiento y seguridad. Asegúrese de hacer y acudir a todas las citas, y llame a santos médico si está teniendo problemas.  También es andry buena idea saber los resultados de amor exámenes y mantener andry lista de los medicamentos que roselia.  ¿Dónde puede encontrar más información en inglés? [place] a [email or web address] e ingrese a santos cuenta de MyChart. Anjel Duggan T499 en el cuadro \"Search Health Information\" para más información (en inglés) sobre \"Espasmo de sherice: Ejercicios. \"     Si no tiene andry cuenta, dotty elder en el enlace \"Sign Up Now\". Revisado: 16 noviembre, 2020               Versión del contenido: 12.9  © 3790-7072 Healthwise, Incorporated. Las instrucciones de cuidado fueron adaptadas bajo licencia por [facility] ([facility]). Si usted tiene [place] afección médica o sobre estas instrucciones, siempre pregunte a santos profesional de allyssa. velingo, [employer] niega toda garantía o responsabilidad por santos uso de esta información.

## 2021-07-19 NOTE — PROGRESS NOTES
S: 38 y.o. female with   Chief Complaint   Patient presents with    Dizziness     balance off since Saturday when massage back of neck has some relief, occasional mild headache, and also having occasional blurred vision       While at [facility] this past weekend had episode of feeling off balance. Denies vertiginous symptoms at the time. During the episode, had sensation of excess gas/burping and indigestion. No LOC. No falls. No   Today during the visit, does note that when turns her head, will feel dizzy    Also notes some pain and tightness in her neck muscles    Started weight loss program and told to stop meds and start supplements starting the last day. Episode in [facility] happened before she started the supplements though and was still taking her Metformin and amlodipine then. Didn't take metformin and amlodipine and had similar episodes again today . Felt anxious and flushed during the visit today    BP Readings from Last 3 Encounters:   07/19/21 (!) 162/96   05/18/21 (!) 148/86   04/26/21 (!) 150/84     Wt Readings from Last 3 Encounters:   07/19/21 294 lb 12.8 oz (133.7 kg)   05/18/21 294 lb 9.6 oz (133.6 kg)   04/26/21 293 lb (132.9 kg)           O: VS:   Vitals:    07/19/21 1446 07/19/21 1502 07/19/21 1552   BP: (!) 160/90 (!) 160/100 (!) 162/96   Site: Right Upper Arm Right Upper Arm    Position: Sitting Sitting    Cuff Size: Large Adult Large Adult    Pulse: 85     Resp: 16     Temp: 98.5 °F (36.9 °C)     TempSrc: Oral     SpO2: 98%     Weight: 294 lb 12.8 oz (133.7 kg)     Height: 5' 3\" (1.6 m)       Body mass index is 52.22 kg/m². AAO/NAD, appropriate affect for mood  Normocephalic, atraumatic, eyes  conjunctiva and sclera normal,   skin no rashes on exposed areas   Insight, judgement normal and in no acute distress  CV: RRR, no murmur, gallop, rub  Respiratory: Clear to auscultation bilaterally, no wheezing, rales. Normal respiratory effort. Neurologicno obvious focal deficits. Lab Results   Component Value Date    WBC 9.5 03/04/2021    HGB 14.1 03/04/2021    HCT 45.2 03/04/2021     03/04/2021    CHOL 194 03/04/2021    TRIG 107 03/04/2021    HDL 44 03/04/2021    LDLCALC 129 03/04/2021    AST 22 03/04/2021     03/04/2021    K 3.9 03/04/2021     03/04/2021    CREATININE 0.6 03/04/2021    BUN 15 03/04/2021    CO2 26 03/04/2021    TSH 1.730 03/04/2021    LABA1C 6.3 (H) 07/19/2021    LABGLOM >90 03/04/2021    CALCIUM 9.5 03/04/2021       EKGnormal sinus rhythm. No acute changes. Diagnosis Orders   1. Anxiety  EKG 12 Lead   2. Prediabetes  POCT glycosylated hemoglobin (Hb A1C)   3. Gastroesophageal reflux disease, unspecified whether esophagitis present  omeprazole (PRILOSEC) 20 MG delayed release capsule   4. Intermittent lightheadedness  meclizine (ANTIVERT) 12.5 MG tablet       Plan  Possible that elevated blood pressure contributor to her symptoms. Push fluids. Take amlodipine daily and monitor blood pressure every day and bring in blood pressure log at follow-up visit next week. Symptoms sound mostly related to blood pressure but somewhat vertiginous at times per her history. Okay for as needed Antivert and to monitor how symptoms do with it. Advised that if would have any worsening symptoms in the meantime, to call or to go to the ER. Consider checking orthostatics at her next appointment. If symptoms persist, consider further cardiac evaluation, including Holter monitor. Does have risk factors for cardiac disease although is young, so would also consider stress test.    Pre-DMcontinue Metformin. Lab Results   Component Value Date    LABA1C 6.3 (H) 07/19/2021     No results found for: EAG      Start Prilosec for possible GERD component of symptoms. Return in about 1 week (around 7/26/2021) for HTN.     Orders Placed:  Orders Placed This Encounter   Procedures    POCT glycosylated hemoglobin (Hb A1C)    EKG 12 Lead     Medications Prescribed:  Orders Placed This Encounter   Medications    omeprazole (PRILOSEC) 20 MG delayed release capsule     Sig: Take 1 capsule by mouth every morning (before breakfast)     Dispense:  30 capsule     Refill:  1    meclizine (ANTIVERT) 12.5 MG tablet     Sig: Take 1 tablet by mouth 3 times daily as needed for Dizziness     Dispense:  21 tablet     Refill:  0       Future Appointments   Date Time Provider Odin Radha   7/26/2021  3:00 PM Rachid Woodward MD [facility] [address] [facility]   9/23/2021  1:00 PM Bridget Cardoso DO [address] [place] [address] Maintenance Due   Topic Date Due    Hepatitis C screen  Never done    Varicella vaccine (1 of 2 - 2-dose childhood series) Never done         Attending Physician Statement  I have discussed the case, including pertinent history and exam findings with the resident. I also have seen the patient and performed key portions of the examination. I agree with the documented assessment and plan as documented by the resident.   GE modifier added to this encounter      Jeri Arnold MD 7/19/2021 6:27 PM

## 2021-07-19 NOTE — PROGRESS NOTES
Health Maintenance Due   Topic Date Due    Hepatitis C screen  Never done    Varicella vaccine (1 of 2 - 2-dose childhood series) Never done

## 2021-07-19 NOTE — PROGRESS NOTES
[address] [facility]. [address] [place] [id number]  Dept: [phone]  Loc: [phone]      Lonnie Chen (:  1985) is a 38 y.o. female,Established patient, here for evaluation of the following chief complaint(s):  Dizziness (balance off since Saturday when massage back of neck has some relief, occasional mild headache, and also having occasional blurred vision)      ASSESSMENT/PLAN:  1. Anxiety  -     EKG 12 Lead  2. Prediabetes  -     POCT glycosylated hemoglobin (Hb A1C)  3. Gastroesophageal reflux disease, unspecified whether esophagitis present  -     omeprazole (PRILOSEC) 20 MG delayed release capsule; Take 1 capsule by mouth every morning (before breakfast), Disp-30 capsule, R-1Normal  4. Intermittent lightheadedness  -     meclizine (ANTIVERT) 12.5 MG tablet; Take 1 tablet by mouth 3 times daily as needed for Dizziness, Disp-21 tablet, R-0Normal    EKG performed when patient was complaining of feeling flushed and anxious. EKG findings: Normal sinus rhythm with no ST-T changes. Patient is advised that she must strictly adhere to her Metformin and her amlodipine and that it is not appropriate for her to get off of these therapies. At this time will start Prilosec every morning, 30 minutes prior to eating anything in the day, for management of her GERD symptoms. We will also prescribe Antivert for as needed use. Patient is counseled at length that the meclizine should not be used daily and should rather only be used when she is symptomatic. Patient is advised to take all medications as prescribed, check her blood pressure daily, keep a blood pressure log, and return in 1 week for follow-up for blood pressure and lightheadedness. Consider 2 to 4-week cardiac event monitor if no improvement in her symptoms. Return in about 1 week (around 2021) for HTN.     SUBJECTIVE/OBJECTIVE:  HPI     HPI using ipad Angolan interpretation. On Saturday she felt off balance and when she massages her neck and feels pain up and down the neck. She was at [facility] then, didn't feel dizzy. Takes metformin for prediabetes and has been on it for about a year. Has been on blood pressure medication for more than 1 year. States she was checking her pressures at home as the current machine doesn't seem to be working. Home pressures 142/89 approximately, doesn't know if it gets higher than this. Saturday was the first time she had the off balance feeling that was this bad. Has had less intense episodes in the past. She was at work where she walks - felt the off balance feeling. Has not had any falls, passing out, loss of vision. When she gets the off balance feeling then she gets blurry vision. Checked her blood sugar and it was 110-120. Stopped her metformin and amlodipine today for a weight loss program through a dietician. Review of Systems   Constitutional: Negative for chills, fatigue and fever. HENT: Negative for ear pain, postnasal drip and trouble swallowing. Eyes: Negative for pain and visual disturbance. Respiratory: Negative for cough and shortness of breath. Cardiovascular: Negative for chest pain and palpitations. Gastrointestinal: Negative for abdominal pain, blood in stool, constipation, diarrhea, nausea and vomiting. Sensation of gas in the chest and indigestion   Genitourinary: Negative for dysuria. Skin: Negative for rash. Neurological: Positive for light-headedness. Negative for dizziness, tremors, seizures, syncope, speech difficulty and headaches. Psychiatric/Behavioral: The patient is nervous/anxious. Physical Exam  Vitals and nursing note reviewed. Constitutional:       General: She is not in acute distress. Appearance: She is well-developed. She is obese. She is not diaphoretic. HENT:      Head: Normocephalic and atraumatic.       Right Ear: External ear normal. Left Ear: External ear normal.      Nose: Nose normal.   Eyes:      General: No scleral icterus. Right eye: No discharge. Left eye: No discharge. Conjunctiva/sclera: Conjunctivae normal.   Cardiovascular:      Rate and Rhythm: Normal rate and regular rhythm. Heart sounds: Normal heart sounds. No murmur heard. Pulmonary:      Effort: Pulmonary effort is normal.      Breath sounds: Normal breath sounds. Musculoskeletal:      Cervical back: Normal range of motion. Skin:     General: Skin is warm and dry. Findings: No erythema or rash. Neurological:      Mental Status: She is alert and oriented to person, place, and time. Cranial Nerves: No cranial nerve deficit. Psychiatric:         Behavior: Behavior normal.         Thought Content: Thought content normal.         Judgment: Judgment normal.           Vitals:    07/19/21 1446 07/19/21 1502 07/19/21 1552   BP: (!) 160/90 (!) 160/100 (!) 162/96   Site: Right Upper Arm Right Upper Arm    Position: Sitting Sitting    Cuff Size: Large Adult Large Adult    Pulse: 85     Resp: 16     Temp: 98.5 °F (36.9 °C)     TempSrc: Oral     SpO2: 98%     Weight: 294 lb 12.8 oz (133.7 kg)     Height: 5' 3\" (1.6 m)           An electronic signature was used to authenticate this note.     --Ursula Ferreira MD

## 2021-07-26 ENCOUNTER — OFFICE VISIT (OUTPATIENT)
Dept: FAMILY MEDICINE CLINIC | Age: 36
End: 2021-07-26
Payer: COMMERCIAL

## 2021-07-26 VITALS
SYSTOLIC BLOOD PRESSURE: 136 MMHG | HEART RATE: 70 BPM | DIASTOLIC BLOOD PRESSURE: 82 MMHG | TEMPERATURE: 98.5 F | HEIGHT: 63 IN | OXYGEN SATURATION: 98 % | BODY MASS INDEX: 51.84 KG/M2 | WEIGHT: 292.6 LBS | RESPIRATION RATE: 14 BRPM

## 2021-07-26 DIAGNOSIS — E66.01 CLASS 3 SEVERE OBESITY WITH BODY MASS INDEX (BMI) OF 50.0 TO 59.9 IN ADULT, UNSPECIFIED OBESITY TYPE, UNSPECIFIED WHETHER SERIOUS COMORBIDITY PRESENT (HCC): ICD-10-CM

## 2021-07-26 DIAGNOSIS — R73.03 PREDIABETES: ICD-10-CM

## 2021-07-26 DIAGNOSIS — I10 ESSENTIAL HYPERTENSION: ICD-10-CM

## 2021-07-26 DIAGNOSIS — K21.9 GASTROESOPHAGEAL REFLUX DISEASE, UNSPECIFIED WHETHER ESOPHAGITIS PRESENT: Primary | ICD-10-CM

## 2021-07-26 PROCEDURE — 99213 OFFICE O/P EST LOW 20 MIN: CPT | Performed by: STUDENT IN AN ORGANIZED HEALTH CARE EDUCATION/TRAINING PROGRAM

## 2021-07-26 RX ORDER — OMEPRAZOLE 40 MG/1
40 CAPSULE, DELAYED RELEASE ORAL
Qty: 30 CAPSULE | Refills: 3 | Status: SHIPPED | OUTPATIENT
Start: 2021-07-26 | End: 2021-10-14 | Stop reason: SDUPTHER

## 2021-07-26 ASSESSMENT — ENCOUNTER SYMPTOMS
DIARRHEA: 0
EYE PAIN: 0
VOMITING: 0
TROUBLE SWALLOWING: 0
BLOOD IN STOOL: 0
COUGH: 0
CONSTIPATION: 0
ABDOMINAL PAIN: 0
NAUSEA: 0
SHORTNESS OF BREATH: 0

## 2021-07-26 NOTE — PROGRESS NOTES
S: 38 y.o. female with   Chief Complaint   Patient presents with    Follow-up     Pt presents for a 1 week f/u. Pt states she has been doing better.  Nausea & Vomiting     Pt presents c/o NVD that started Saturday after she ate tacos. She is doing better but would like to discuss. Here for heartburn and chest tighntess    The omeprazole really helps    The antivert maybe helped - no more episodes    Joined the whole 30 to lose weight    BP Readings from Last 3 Encounters:   07/26/21 136/82   07/19/21 (!) 162/96   05/18/21 (!) 148/86     Wt Readings from Last 3 Encounters:   07/26/21 292 lb 9.6 oz (132.7 kg)   07/19/21 294 lb 12.8 oz (133.7 kg)   05/18/21 294 lb 9.6 oz (133.6 kg)           O: VS:   Vitals:    07/26/21 1506   BP: 136/82   Pulse: 70   Resp: 14   Temp: 98.5 °F (36.9 °C)   SpO2: 98%   Weight: 292 lb 9.6 oz (132.7 kg)   Height: 5' 3\" (1.6 m)     Body mass index is 51.83 kg/m². Lab Results   Component Value Date    WBC 9.5 03/04/2021    HGB 14.1 03/04/2021    HCT 45.2 03/04/2021     03/04/2021    CHOL 194 03/04/2021    TRIG 107 03/04/2021    HDL 44 03/04/2021    LDLCALC 129 03/04/2021    AST 22 03/04/2021     03/04/2021    K 3.9 03/04/2021     03/04/2021    CREATININE 0.6 03/04/2021    BUN 15 03/04/2021    CO2 26 03/04/2021    TSH 1.730 03/04/2021    LABA1C 6.3 (H) 07/19/2021    LABGLOM >90 03/04/2021    CALCIUM 9.5 03/04/2021       No results found. Diagnosis Orders   1. Gastroesophageal reflux disease, unspecified whether esophagitis present  omeprazole (PRILOSEC) 40 MG delayed release capsule   2. Essential hypertension     3. Prediabetes     4.  Class 3 severe obesity with body mass index (BMI) of 50.0 to 59.9 in adult, unspecified obesity type, unspecified whether serious comorbidity present ([facility] [place] [address]         Plan  Will increase the dose from 20-40 of omeprazole    Ok to be on the bp and diabetes meds with the program    Will stay on the whole 30 program    Will keep watching the weight and may need to decrease meds as you lose weight    Back in 3 months       Return in about 3 months (around 10/26/2021) for weight loss. Orders Placed:  No orders of the defined types were placed in this encounter. Medications Prescribed:  Orders Placed This Encounter   Medications    omeprazole (PRILOSEC) 40 MG delayed release capsule     Sig: Take 1 capsule by mouth every morning (before breakfast)     Dispense:  30 capsule     Refill:  3       Future Appointments   Date Time Provider Odin Garcia   9/23/2021  1:00 PM Bridget Cardoso  [facility] Maintenance Due   Topic Date Due    Hepatitis C screen  Never done    Varicella vaccine (1 of 2 - 2-dose childhood series) Never done         Attending Physician Statement  I have discussed the case, including pertinent history and exam findings with the resident. [id number]  I agree with the documented assessment and plan as documented by the resident.   GE modifier added to this encounter      Subha Lewis DO 7/26/2021 3:38 PM

## 2021-07-26 NOTE — PATIENT INSTRUCTIONS
Patient Education        Índice de Health Net corporal: Instrucciones de cuidado  Body Mass Index: Care Instructions  Instrucciones de cuidado    El índice de masa corporal [facility] le permite saber si santos peso aumenta santos riesgo de tener problemas de [place]. Se obtiene a partir de andry fórmula que compara el peso con la altura. · Un IMC de menos de 18.5 se considera peso insuficiente. · Un [facility] de entre 18.5 y 24.9 se considera saludable. · Un [facility] de entre 25 y 29.9 se considera sobrepeso. · Un [facility] de 30 o más se considera obesidad. Si santos [facility] está dentro de los [employer], significa que usted tiene mesfin riesgo de problemas de allyssa relacionados con el peso. Si santos [facility] está dentro de los límites de sobrepeso u obesidad, el riesgo de tener enfermedades relacionadas con el peso podría ser mayor. Estas enfermedades incluyen presión arterial cindy, enfermedades cardíacas, ataque cerebral, artritis o dolor articular, y diabetes. Si santos [facility] se encuentra dentro de los límites de peso insuficiente, es posible que usted tenga un mayor riesgo de tener problemas ace fatiga, andry mesfin protección (inmunidad) contra enfermedades, pérdida muscular, pérdida ósea, pérdida del kaylah y problemas hormonales. El [facility] es solo andry medida del riesgo de tener problemas de allyssa relacionados con [place]. Si no hace [place], no sigue andry dieta saludable, erica demasiado alcohol o [place] productos derivados del tabaco, el riesgo de tener problemas de allyssa podría ser [employer]. La atención de seguimiento es andry parte clave de santos tratamiento y seguridad. Asegúrese de hacer y acudir a todas las citas, y llame a santos médico si está teniendo problemas. También es andry buena idea saber los resultados de amor exámenes y mantener andry lista de los medicamentos que roselia. ¿Cómo puede cuidarse en el hogar? · Siga hábitos de alimentación saludables.  [place] incluye comer abundantes frutas, verduras, granos integrales, proteínas magras y productos lácteos bajos en grasa. · Si santos médico lo recomienda, dotty más ejercicio. Caminar es andry buena opción. Poco a poco, aumente la distancia que camina cada día. Trate de hacer al menos 30 minutos de ejercicio la mayoría de los días de la [place]. · No fume. Fumar puede aumentar el riesgo de tener problemas de [place]. Si necesita ayuda para dejar de fumar, hable con santos médico sobre programas y medicamentos para dejar de fumar. Estos pueden aumentar amor probabilidades de dejar de fumar para siempre. · Limite el alcohol a 2 bebidas al día si es hombre, y 1 bebida al día si es adelina. Demasiado alcohol puede causar problemas de allyssa. Si tiene un [address] mayor de 25  · Santos médico puede solicitar otras pruebas para evaluar santos riesgo de tener problemas de allyssa relacionados con [place]. [place] podría incluir medir santos cintura. Si es hombre y santos cintura mide más de 40 pulgadas (1 m) o si es adelina y santos cintura mide más de 35 pulgadas (89 cm), el riesgo de tener problemas de allyssa relacionados con el peso puede [place]. · Hable con santos [place] medidas que puede torey para mantenerse saludable o mejorar santos allyssa. Josh vez tenga que hacer cambios en santos estilo de luisito para bajar peso y estar saludable, tales ace cambiar santos alimentación y hacer ejercicio con regularidad. Si tiene un [address] inferior a 18.5  · Santos médico puede hacerle otras pruebas para evaluar santos riesgo de tener problemas de [place]. · Hable con santos [place] medidas que puede torey para mantenerse saludable o mejorar santos allyssa. Josh vez tenga que hacer cambios en santos estilo de luisito para subir de peso y [place] saludable, tales ace incluir más alimentos saludables en santos alimentación y hacer ejercicios para desarrollar los músculos. ¿Dónde puede encontrar más información en inglés? Yuly Colindres a [email or web address] e ingrese a santos cuenta de MyChart.  [place] [address]369 en el [place] \"Search Health Information\" para más información (en inglés) sobre \"Índice de masa corporal: Instrucciones de cuidado. \"     Si no tiene andry cuenta, dotty clic en el enlace \"Sign Up Now\". Revisado: 17 marzo, 2021               Versión del contenido: 12.9  © 2038-2238 Healthwise, Incorporated. Las instrucciones de cuidado fueron adaptadas bajo licencia por [facility] ([facility]). Si usted tiene [place] afección médica o sobre estas instrucciones, siempre pregunte a santos profesional de allyssa. Healthwise, Incorporated niega toda garantía o responsabilidad por santos uso de esta información. Patient Education        Prediabetes: Instrucciones de cuidado  Prediabetes: Care Instructions  Instrucciones de cuidado  La prediabetes es andry señal de advertencia de que usted está en riesgo de llegar a tener diabetes tipo 2. IXL significa que santos nivel de azúcar en la richard es más alto de lo que debiera ser. Los alimentos que usted come se convierten en azúcar, la cual santos cuerpo [place] para obtener energía. Normalmente, un órgano llamado páncreas produce insulina, la cual permite que el azúcar en la richard llegue a las células del cuerpo. Beth cuando el cuerpo no puede utilizar la [employer], el azúcar no entra en las células. En cambio, se queda en [place] [employer]. IXL se conoce ace resistencia a la insulina. La acumulación de azúcar en la richard causa prediabetes. Lo see es que hacer cambios en santos estilo de luisito puede ayudarle a hacer que el azúcar en la richard vuelva a un nivel normal y puede ayudarle a evitar o retrasar la diabetes. La atención de seguimiento es andry parte clave de santos tratamiento y seguridad. Asegúrese de hacer y acudir a todas las citas, y llame a santos médico si está teniendo problemas. También es andry buena idea saber los resultados de amor exámenes y mantener andry lista de los medicamentos que roselia. ¿Cómo puede cuidarse en el hogar? · Vigile santos peso. Un peso saludable ayuda al organismo a usar la insulina de la [place].   · Limite la cantidad de calorías, dulces y grasas poco saludables que come. Pregunte a santos médico si debería consultar a un dietista. Un dietista registrado puede ayudarle a elaborar planes de alimentación que se adapten a santos estilo de luisito. · Dotty por lo menos 30 minutos de ejercicio la mayoría de los días de la [place]. El ejercicio ayuda a controlar el azúcar en santos richard. También ayuda a mantener un peso saludable. Caminar es andry buena opción. Es posible que también quiera hacer otras actividades, ace correr, nadar, [employer], o jugar al tenis u otros deportes de equipo. · No fume. Fumar puede empeorar la prediabetes. Si necesita ayuda para dejar de fumar, hable con santos médico sobre programas y medicamentos para dejar de fumar. Estos pueden aumentar amor probabilidades de dejar el hábito para siempre. · Si santos médico le recetó medicamentos, tómelos exactamente ace se lo indicó. Llame a santos médico si nichelle estar teniendo problemas con santos medicamento. [place] [employer] medicamentos específicos recetados por santos médico.  ¿Cuándo debe pedir ayuda? Preste especial atención a los cambios en santos allyssa y asegúrese de comunicarse con santos médico si:    · Tiene cualquier síntoma de diabetes. Estos síntomas podrían incluir:  ? Tener sed con más frecuencia. ? [employer]. ? [employer]. ? [employer]. ? Sentirse muy cansado. ? Tener visión borrosa.     · Tiene andry herida que no cicatriza.     · Tiene andry infección que no desaparece.     · Tiene problemas con santos presión arterial.     · Desea más información sobre la diabetes y cómo evitarla. ¿Dónde puede encontrar más información en inglés? Jed Schultz a [email or web address] e ingrese a santos cuenta de MyChart. Xavier Melgar I222 en el cuadro \"Search Health Information\" para más información (en inglés) sobre \"Prediabetes: Instrucciones de cuidado. \"     Si no tiene andry cuenta, dotty elder en el enlace \"Sign Up Now\".   Revisado: 31 agosto, 2020               Versión del contenido: 12.9  © 5737-5171 Healthwise, [place]. Las instrucciones de cuidado fueron adaptadas bajo licencia por [facility] ([facility]). Si usted tiene [place] afección médica o sobre estas instrucciones, siempre pregunte a santos profesional de allyssa. [employer], [place] niega toda garantía o responsabilidad por santos uso de esta información.

## 2021-07-26 NOTE — PROGRESS NOTES
[address] [facility] [address] [place] [address]  Dept: [phone]  Loc: [phone]      Lonnie Chen (:  1985) is a 38 y.o. female,Established patient, here for evaluation of the following chief complaint(s):  Follow-up (Pt presents for a 1 week f/u. Pt states she has been doing better. ) and Nausea & Vomiting (Pt presents c/o NVD that started Saturday after she ate tacos. She is doing better but would like to discuss. )      ASSESSMENT/PLAN:  1. Gastroesophageal reflux disease, unspecified whether esophagitis present  -     omeprazole (PRILOSEC) 40 MG delayed release capsule; Take 1 capsule by mouth every morning (before breakfast), Disp-30 capsule, R-3Normal  2. Essential hypertension  3. Prediabetes  4. Class 3 severe obesity with body mass index (BMI) of 50.0 to 59.9 in adult, unspecified obesity type, unspecified whether serious comorbidity present (HCC)      Omeprazole increase today. Okay to restart her total 30 weight loss program (supplements, diet) but advised not to stop HTN and blood sugar medications for that program. If they do not accept then we will work on weight loss through our office here. Can consider cinnamon supplements, GF diet, high protein diet, +/- wellbutrin to aid with appetite suppression. She is not a good candidate for phentermine due to HTN but possibly could try topamax if she is not trying to get pregnant/is using a good form of birth control. I did talk to pt's weight loss representative Teodora (with patient present,  present, and pt consenting to release of information) who confirms that pt can stay on her medications and do the program. They will send us a release form to sign stating patient can do the program.     Return in about 3 months (around 10/26/2021) for weight loss.     SUBJECTIVE/OBJECTIVE:  HPI     Has been usig the omeprazole - using it once a day, it has helped some. Didn't have to use the meclizine because she didn't have any more dizziness episodes. Has been checking pressures at home: 120s/80s. No issues with blood pressures, has been taking amlodipine as prescribed. Wants to talk about losing weight, wants to discuss if she can continue her program. Is on it right now but did not use it as advised. She was told to hold off on it until she talked to the doctor. Review of Systems   Constitutional: Negative for chills, fatigue and fever. HENT: Negative for ear pain, postnasal drip and trouble swallowing. Eyes: Negative for pain and visual disturbance. Respiratory: Negative for cough and shortness of breath. Cardiovascular: Negative for chest pain and palpitations. Gastrointestinal: Negative for abdominal pain, blood in stool, constipation, diarrhea, nausea and vomiting. Genitourinary: Negative for dysuria. Skin: Negative for rash. Neurological: Negative for headaches. Physical Exam  Vitals and nursing note reviewed. Constitutional:       General: She is not in acute distress. Appearance: She is well-developed. She is obese. She is not diaphoretic. HENT:      Head: Normocephalic and atraumatic. Right Ear: External ear normal.      Left Ear: External ear normal.      Nose: Nose normal.   Eyes:      General: No scleral icterus. Right eye: No discharge. Left eye: No discharge. Conjunctiva/sclera: Conjunctivae normal.   Cardiovascular:      Rate and Rhythm: Normal rate and regular rhythm. Heart sounds: Normal heart sounds. No murmur heard. Pulmonary:      Effort: Pulmonary effort is normal.      Breath sounds: Normal breath sounds. Musculoskeletal:      Cervical back: Normal range of motion. Skin:     General: Skin is warm and dry. Findings: No erythema or rash. Neurological:      Mental Status: She is alert and oriented to person, place, and time. Cranial Nerves:  No cranial nerve deficit. Psychiatric:         Behavior: Behavior normal.         Thought Content: Thought content normal.         Judgment: Judgment normal.           Vitals:    07/26/21 1506   BP: 136/82   Pulse: 70   Resp: 14   Temp: 98.5 °F (36.9 °C)   SpO2: 98%   Weight: 292 lb 9.6 oz (132.7 kg)   Height: 5' 3\" (1.6 m)         An electronic signature was used to authenticate this note.     --Beryl Guevara MD

## 2021-07-28 ENCOUNTER — TELEPHONE (OUTPATIENT)
Dept: FAMILY MEDICINE CLINIC | Age: 36
End: 2021-07-28

## 2021-09-24 ENCOUNTER — HOSPITAL ENCOUNTER (OUTPATIENT)
Age: 36
Setting detail: SPECIMEN
Discharge: HOME | End: 2021-09-24
Payer: COMMERCIAL

## 2021-09-24 LAB — HCG QUANTITATIVE: <1 IU/L

## 2021-10-14 ENCOUNTER — PATIENT MESSAGE (OUTPATIENT)
Dept: FAMILY MEDICINE CLINIC | Age: 36
End: 2021-10-14

## 2021-10-14 DIAGNOSIS — K21.9 GASTROESOPHAGEAL REFLUX DISEASE, UNSPECIFIED WHETHER ESOPHAGITIS PRESENT: ICD-10-CM

## 2021-10-14 DIAGNOSIS — E66.01 MORBID OBESITY (HCC): ICD-10-CM

## 2021-10-14 DIAGNOSIS — R73.03 PREDIABETES: ICD-10-CM

## 2021-10-14 DIAGNOSIS — I10 ESSENTIAL HYPERTENSION: ICD-10-CM

## 2021-10-14 RX ORDER — AMLODIPINE BESYLATE 10 MG/1
10 TABLET ORAL DAILY
Qty: 30 TABLET | Refills: 3 | Status: SHIPPED | OUTPATIENT
Start: 2021-10-14 | End: 2021-11-24 | Stop reason: SDUPTHER

## 2021-10-14 RX ORDER — METFORMIN HYDROCHLORIDE 500 MG/1
500 TABLET, EXTENDED RELEASE ORAL
Qty: 90 TABLET | Refills: 1 | Status: SHIPPED | OUTPATIENT
Start: 2021-10-14 | End: 2021-11-24 | Stop reason: SDUPTHER

## 2021-10-14 RX ORDER — OMEPRAZOLE 40 MG/1
40 CAPSULE, DELAYED RELEASE ORAL
Qty: 30 CAPSULE | Refills: 3 | Status: SHIPPED | OUTPATIENT
Start: 2021-10-14 | End: 2021-10-25

## 2021-10-14 NOTE — TELEPHONE ENCOUNTER
----- Message from Celso Leahy sent at 10/14/2021 12:55 PM EDT -----  Subject: Refill Request    QUESTIONS  Name of Medication? omeprazole (PRILOSEC) 40 MG delayed release capsule  Patient-reported dosage and instructions? unsure  How many days do you have left? 0  Preferred Pharmacy? [id number]  Pharmacy phone number (if available)? [phone]  ---------------------------------------------------------------------------  --------------,  Name of Medication? metFORMIN (GLUCOPHAGE-XR) 500 MG extended release   tablet  Patient-reported dosage and instructions? 1 a day  How many days do you have left? 0  Preferred Pharmacy? [id number]  Pharmacy phone number (if available)? [phone]  ---------------------------------------------------------------------------  --------------,  Name of Medication? amLODIPine (NORVASC) 10 MG tablet  Patient-reported dosage and instructions? 1 a day  How many days do you have left? 0  Preferred Pharmacy? [id number]  Pharmacy phone number (if available)? [phone]  ---------------------------------------------------------------------------  --------------  CALL BACK INFO  What is the best way for the office to contact you? OK to leave message on   voicemail  Preferred Call Back Phone Number?  [phone]

## 2021-10-14 NOTE — TELEPHONE ENCOUNTER
Patient's last appointment was : 7/26/2021  Patient's next appointment is : 10/25/2021  Last refilled: 5/11/21

## 2021-10-14 NOTE — TELEPHONE ENCOUNTER
Patient's last appointment was : 7/26/2021  Patient's next appointment is : 10/25/2021  Last refilled: 7/26/21 and 12/4/20

## 2021-10-14 NOTE — TELEPHONE ENCOUNTER
From: Thai Rousseau  To: Malik Markham MD  Sent: 10/14/2021 12:40 PM EDT  Subject: Non-Urgent Medical Question    I am trying to communicate I need refills of my blood pressure medication and the one they gave me for acid and metformin

## 2021-10-25 ENCOUNTER — OFFICE VISIT (OUTPATIENT)
Dept: FAMILY MEDICINE CLINIC | Age: 36
End: 2021-10-25
Payer: COMMERCIAL

## 2021-10-25 VITALS
HEART RATE: 96 BPM | DIASTOLIC BLOOD PRESSURE: 86 MMHG | WEIGHT: 293 LBS | TEMPERATURE: 96.8 F | BODY MASS INDEX: 51.91 KG/M2 | HEIGHT: 63 IN | RESPIRATION RATE: 16 BRPM | OXYGEN SATURATION: 97 % | SYSTOLIC BLOOD PRESSURE: 132 MMHG

## 2021-10-25 DIAGNOSIS — R73.03 PREDIABETES: ICD-10-CM

## 2021-10-25 DIAGNOSIS — E66.01 CLASS 3 SEVERE OBESITY WITH BODY MASS INDEX (BMI) OF 50.0 TO 59.9 IN ADULT, UNSPECIFIED OBESITY TYPE, UNSPECIFIED WHETHER SERIOUS COMORBIDITY PRESENT (HCC): Primary | ICD-10-CM

## 2021-10-25 DIAGNOSIS — I10 ESSENTIAL HYPERTENSION: ICD-10-CM

## 2021-10-25 DIAGNOSIS — N92.6 MISSED PERIOD: ICD-10-CM

## 2021-10-25 LAB
CONTROL: NORMAL
PREGNANCY TEST URINE, POC: NEGATIVE

## 2021-10-25 PROCEDURE — 99214 OFFICE O/P EST MOD 30 MIN: CPT | Performed by: STUDENT IN AN ORGANIZED HEALTH CARE EDUCATION/TRAINING PROGRAM

## 2021-10-25 PROCEDURE — 81025 URINE PREGNANCY TEST: CPT | Performed by: STUDENT IN AN ORGANIZED HEALTH CARE EDUCATION/TRAINING PROGRAM

## 2021-10-25 ASSESSMENT — ENCOUNTER SYMPTOMS
CONSTIPATION: 0
BLOOD IN STOOL: 0
SHORTNESS OF BREATH: 0
TROUBLE SWALLOWING: 0
VOMITING: 0
NAUSEA: 0
ABDOMINAL PAIN: 0
EYE PAIN: 0
COUGH: 0
DIARRHEA: 0

## 2021-10-25 NOTE — PROGRESS NOTES
[address] [facility] [address] [facility] [address]  Dept: [phone]  Loc: [phone]      Kortney López (:  1985) is a 38 y.o. female,Established patient, here for evaluation of the following chief complaint(s):  3 Month Follow-Up (weight loss) and Other (vaginal spotting 2021 no menstrual period since 2021)      ASSESSMENT/PLAN:  1. Class 3 severe obesity with body mass index (BMI) of 50.0 to 59.9 in adult, unspecified obesity type, unspecified whether serious comorbidity present ([facility] [address]  2. Missed period  -     POCT urine pregnancy  3. Prediabetes  4. Essential hypertension    Patient declines referral to bariatric surgery today. Would like to continue the new high-protein low calorie diet that she started today and would like to incorporate exercise as well and see how much weight she is able to lose with lifestyle changes. Not a candidate for Topamax at this time considering unknown if she is pregnant. We will give her another month to see if she he does get periods otherwise at this time assuming she is pregnant. Point-of-care urine pregnancy test done today in the office however will likely not yield clinically significant results as her urine is very diluted. Patient is advised to continue her Metformin, do not stop this medication. She is advised to continue amlodipine, she is advised that she should not stop this medication. Due to her hypertension, she is not a candidate for phentermine at this time. Can consider Wellbutrin for her in the future however patient seems to be seeking medication to help with weight loss is more of a crutch. So far has not been very successful with lifestyle changes. At this time we will try to encourage lifestyle changes and see if she is able to maintain them rather than starting medication initially. Patient says she is motivated to make lifestyle changes. We will continue close follow-up to assess if she did restart periods as currently unclear if she is pregnant versus the weight gain is causing loss of periods. Weight check in at next appt as well. Of note, she had a serum hCG that was negative about 1 month ago. Repeat pregnancy testing today in the office considering patient stated that with her last pregnancy, she did not have a positive pregnancy test until she was about 3 months along. Urine preg negative today. I did review the 2 supplements that she is starting for her current weight loss regimen. She is advised that the one supplement that has caffeine in it may potentially increase her blood pressure and that she should continue to monitor for any changes in blood pressure control. She is advised that the other supplement that she is on may not help or hurt weight loss at all as it appears to just be a combination of fruit extracts. The entirety of this visit with the patient was performed with official virtual  present. Return in about 1 month (around 11/25/2021) for weight loss, periods. SUBJECTIVE/OBJECTIVE:  HPI    Here to talk about weight loss. Has not lost any weight and in fact has gained weight. Feels okay today but has some questions about weight loss plan. Is doing 2 protein shakes per day and some supplements. Stopped the GERD medicines. Having occasional headache and dizziness. Is drinking a lot of water, around a gallon per day. Hasn't had period since August.     Review of Systems   Constitutional: Positive for unexpected weight change (increase). Negative for chills, fatigue and fever. HENT: Negative for ear pain, postnasal drip and trouble swallowing. Eyes: Negative for pain and visual disturbance. Respiratory: Negative for cough and shortness of breath. Cardiovascular: Negative for chest pain and palpitations.    Gastrointestinal: Negative for abdominal pain, blood in stool, constipation, diarrhea, nausea and vomiting. Genitourinary: Positive for menstrual problem. Negative for dysuria. Skin: Negative for rash. Neurological: Positive for dizziness and headaches. Physical Exam  Vitals and nursing note reviewed. Constitutional:       General: She is not in acute distress. Appearance: She is well-developed. She is not diaphoretic. HENT:      Head: Normocephalic and atraumatic. Right Ear: External ear normal.      Left Ear: External ear normal.      Nose: Nose normal.   Eyes:      General: No scleral icterus. Right eye: No discharge. Left eye: No discharge. Conjunctiva/sclera: Conjunctivae normal.   Cardiovascular:      Heart sounds: Normal heart sounds. No murmur heard. Pulmonary:      Effort: Pulmonary effort is normal.   Musculoskeletal:      Cervical back: Normal range of motion. Skin:     General: Skin is warm and dry. Findings: No erythema or rash. Neurological:      Mental Status: She is alert and oriented to person, place, and time. Cranial Nerves: No cranial nerve deficit. Psychiatric:         Behavior: Behavior normal.         Thought Content: Thought content normal.         Judgment: Judgment normal.           Vitals:    10/25/21 1326   BP: 132/86   Site: Left Upper Arm   Position: Sitting   Cuff Size: Large Adult   Pulse: 96   Resp: 16   Temp: 96.8 °F (36 °C)   TempSrc: Skin   SpO2: 97%   Weight: (!) 302 lb 12.8 oz (137.3 kg)   Height: 5' 3\" (1.6 m)         An electronic signature was used to authenticate this note.     --Libby Forte MD

## 2021-10-25 NOTE — PROGRESS NOTES
S: 38 y.o. female with   Chief Complaint   Patient presents with    3 Month Follow-Up     weight loss    Other     vaginal spotting 9/16/2021 no menstrual period since 8/2021       F/u weight loss  States that  doesn't want her to do bariatric surgery. Has failed several programs and tried many other programs  Doing low calorie and high protein diet. Wants meds for weight loss  Has high BP so doesn't qualify for adipex    Missed period-  Was pregnant in the past without having positive urine pregnancy test  Last period in August  Doesn't usually skip periods  Discussed with pt that topamax not good option if unable to reliably know if pregnant. preDM-  Lab Results   Component Value Date    LABA1C 6.3 (H) 07/19/2021     No results found for: EAG   Reminded to continue metformin        BP Readings from Last 3 Encounters:   10/25/21 132/86   07/26/21 136/82   07/19/21 (!) 162/96     Wt Readings from Last 3 Encounters:   10/25/21 (!) 302 lb 12.8 oz (137.3 kg)   07/26/21 292 lb 9.6 oz (132.7 kg)   07/19/21 294 lb 12.8 oz (133.7 kg)           O: VS:   Vitals:    10/25/21 1326   BP: 132/86   Site: Left Upper Arm   Position: Sitting   Cuff Size: Large Adult   Pulse: 96   Resp: 16   Temp: 96.8 °F (36 °C)   TempSrc: Skin   SpO2: 97%   Weight: (!) 302 lb 12.8 oz (137.3 kg)   Height: 5' 3\" (1.6 m)     Body mass index is 53.64 kg/m².     AAO/NAD, appropriate affect for mood; obese  Normocephalic, atraumatic, eyes  conjunctiva and sclera normal,   skin no rashes on exposed areas   Insight, judgement normal and in no acute distress    Lab Results   Component Value Date    WBC 9.5 03/04/2021    HGB 14.1 03/04/2021    HCT 45.2 03/04/2021     03/04/2021    CHOL 194 03/04/2021    TRIG 107 03/04/2021    HDL 44 03/04/2021    LDLCALC 129 03/04/2021    AST 22 03/04/2021     03/04/2021    K 3.9 03/04/2021     03/04/2021    CREATININE 0.6 03/04/2021    BUN 15 03/04/2021    CO2 26 03/04/2021    TSH 1.730 03/04/2021    LABA1C 6.3 (H) 07/19/2021    LABGLOM >90 03/04/2021    CALCIUM 9.5 03/04/2021       No results found. Diagnosis Orders   1. Class 3 severe obesity with body mass index (BMI) of 50.0 to 59.9 in adult, unspecified obesity type, unspecified whether serious comorbidity present ([facility] [address]     2. Missed period  POCT urine pregnancy   3. Prediabetes     4. Essential hypertension         Plan  Advised to start with increasing exercising, diet log, healthy diet  Would likely benefit from bariatric surgery  Important to stick with healthy diet for at least a couple months to start see benefits    preDM-  Continue on metformin  Monitor a1c    HTN- OK today  Would likely improve and be able to decrease meds with weight loss    F/u 1 month     Return in about 1 month (around 11/25/2021) for weight loss, periods. Orders Placed:  Orders Placed This Encounter   Procedures    POCT urine pregnancy     Medications Prescribed:  No orders of the defined types were placed in this encounter. Future Appointments   Date Time Provider Odin Garcia   12/2/2021  3:40 PM Bridget Cardoso  [facility] Maintenance Due   Topic Date Due    Hepatitis C screen  Never done    Varicella vaccine (1 of 2 - 2-dose childhood series) Never done    Flu vaccine (1) Never done         Attending Physician Statement  I have discussed the case, including pertinent history and exam findings with the resident. I also have seen the patient and performed key portions of the examination. I agree with the documented assessment and plan as documented by the resident.   GC modifier added to this encounter      Daniel Lundberg MD 10/25/2021 2:35 PM

## 2021-10-25 NOTE — LETTER
[address] [address],[address] [id number]  Phone: [phone]  Fax: [phone]    Diego Nagel MD        October 25, 2021     Patient: Jimmie Bradley   YOB: 1985   Date of Visit: 10/25/2021       To Whom it May Concern:    Jimmie Bradley was seen in my clinic on 10/25/2021. Please allow her to wear her wearable fitness tracker while at work. If you have any questions or concerns, please don't hesitate to call.     Sincerely,         Diego Nagel MD

## 2021-10-25 NOTE — PROGRESS NOTES
Health Maintenance Due   Topic Date Due    Hepatitis C screen  Never done    Varicella vaccine (1 of 2 - 2-dose childhood series) Never done    Flu vaccine (1) Never done

## 2021-11-05 ENCOUNTER — HOSPITAL ENCOUNTER (OUTPATIENT)
Dept: ULTRASOUND IMAGING | Age: 36
Discharge: HOME | End: 2021-11-05
Payer: COMMERCIAL

## 2021-11-05 DIAGNOSIS — R10.11 ABDOMINAL PAIN, RIGHT UPPER QUADRANT: ICD-10-CM

## 2021-11-05 PROCEDURE — 76705 ECHO EXAM OF ABDOMEN: CPT

## 2021-11-24 ENCOUNTER — OFFICE VISIT (OUTPATIENT)
Dept: FAMILY MEDICINE CLINIC | Age: 36
End: 2021-11-24
Payer: COMMERCIAL

## 2021-11-24 VITALS
RESPIRATION RATE: 16 BRPM | HEART RATE: 103 BPM | DIASTOLIC BLOOD PRESSURE: 52 MMHG | HEIGHT: 62 IN | TEMPERATURE: 97.3 F | BODY MASS INDEX: 49.65 KG/M2 | OXYGEN SATURATION: 98 % | WEIGHT: 269.8 LBS | SYSTOLIC BLOOD PRESSURE: 130 MMHG

## 2021-11-24 DIAGNOSIS — R42 VERTIGO: ICD-10-CM

## 2021-11-24 DIAGNOSIS — K80.20 CALCULUS OF GALLBLADDER WITHOUT CHOLECYSTITIS WITHOUT OBSTRUCTION: Primary | ICD-10-CM

## 2021-11-24 DIAGNOSIS — K21.9 GASTROESOPHAGEAL REFLUX DISEASE, UNSPECIFIED WHETHER ESOPHAGITIS PRESENT: ICD-10-CM

## 2021-11-24 DIAGNOSIS — R10.11 COLICKY RUQ ABDOMINAL PAIN: ICD-10-CM

## 2021-11-24 DIAGNOSIS — E66.01 MORBID OBESITY (HCC): ICD-10-CM

## 2021-11-24 DIAGNOSIS — G89.29 CHRONIC NONINTRACTABLE HEADACHE, UNSPECIFIED HEADACHE TYPE: ICD-10-CM

## 2021-11-24 DIAGNOSIS — I10 ESSENTIAL HYPERTENSION: ICD-10-CM

## 2021-11-24 DIAGNOSIS — R73.03 PREDIABETES: ICD-10-CM

## 2021-11-24 DIAGNOSIS — R51.9 CHRONIC NONINTRACTABLE HEADACHE, UNSPECIFIED HEADACHE TYPE: ICD-10-CM

## 2021-11-24 PROCEDURE — 99214 OFFICE O/P EST MOD 30 MIN: CPT | Performed by: STUDENT IN AN ORGANIZED HEALTH CARE EDUCATION/TRAINING PROGRAM

## 2021-11-24 RX ORDER — MECLIZINE HCL 12.5 MG/1
12.5 TABLET ORAL 3 TIMES DAILY PRN
Qty: 21 TABLET | Refills: 0 | Status: SHIPPED | OUTPATIENT
Start: 2021-11-24 | End: 2021-12-01

## 2021-11-24 RX ORDER — TOPIRAMATE 25 MG/1
25 TABLET ORAL 2 TIMES DAILY
Qty: 60 TABLET | Refills: 1 | Status: SHIPPED | OUTPATIENT
Start: 2021-11-24 | End: 2021-11-24

## 2021-11-24 RX ORDER — OMEPRAZOLE 20 MG/1
20 CAPSULE, DELAYED RELEASE ORAL
Qty: 30 CAPSULE | Refills: 1 | Status: SHIPPED | OUTPATIENT
Start: 2021-11-24 | End: 2022-02-07

## 2021-11-24 RX ORDER — METFORMIN HYDROCHLORIDE 500 MG/1
500 TABLET, EXTENDED RELEASE ORAL
Qty: 90 TABLET | Refills: 3 | Status: SHIPPED | OUTPATIENT
Start: 2021-11-24 | End: 2022-05-20 | Stop reason: SDUPTHER

## 2021-11-24 RX ORDER — AMLODIPINE BESYLATE 10 MG/1
10 TABLET ORAL DAILY
Qty: 90 TABLET | Refills: 3 | Status: SHIPPED | OUTPATIENT
Start: 2021-11-24 | End: 2022-06-13 | Stop reason: SDUPTHER

## 2021-11-24 ASSESSMENT — ENCOUNTER SYMPTOMS
COUGH: 0
VOMITING: 0
NAUSEA: 0
DIARRHEA: 0
ABDOMINAL PAIN: 1
TROUBLE SWALLOWING: 0
EYE PAIN: 0
SHORTNESS OF BREATH: 0
CONSTIPATION: 0
BLOOD IN STOOL: 0

## 2021-11-24 NOTE — PROGRESS NOTES
SUBJECTIVE     Dominick Reyes is a 43 y. o.female    Chief Complaint   Patient presents with    Results     US- gallstone     Dizziness     starting yesterday when she turns her head to the right to fast    Pain     right stomach pain     Other     discuss weightloss medication from last appointment       Chief complaint, [place], and all pertinent details of the case reviewed with the resident. Please see resident's note for specific details discussed at today's visit. Patient Active Problem List   Diagnosis    Impaired fasting glucose    Morbid (severe) obesity due to excess calories (HCC)    Essential hypertension       Current Outpatient Medications   Medication Sig Dispense Refill    amLODIPine (NORVASC) 10 MG tablet Take 1 tablet by mouth daily 90 tablet 3    metFORMIN (GLUCOPHAGE-XR) 500 MG extended release tablet Take 1 tablet by mouth daily (with breakfast) 90 tablet 3    omeprazole (PRILOSEC) 20 MG delayed release capsule Take 1 capsule by mouth every morning (before breakfast) 30 capsule 1    meclizine (ANTIVERT) 12.5 MG tablet Take 1 tablet by mouth 3 times daily as needed for Dizziness 21 tablet 0     No current facility-administered medications for this visit. Review of Systems per Dr. Kristine Johnson     BP (!) 130/52 (Site: Left Upper Arm, Position: Sitting, Cuff Size: Large Adult)   Pulse 103   Temp 97.3 °F (36.3 °C) (Skin)   Resp 16   Ht 5' 2\" (1.575 m)   Wt 269 lb 12.8 oz (122.4 kg)   LMP 11/22/2021 (Exact Date)   SpO2 98%   Breastfeeding No   BMI 49.35 kg/m²   BP Readings from Last 3 Encounters:   11/24/21 (!) 130/52   10/25/21 132/86   07/26/21 136/82       Wt Readings from Last 3 Encounters:   11/24/21 269 lb 12.8 oz (122.4 kg)   10/25/21 (!) 302 lb 12.8 oz (137.3 kg)   07/26/21 292 lb 9.6 oz (132.7 kg)     Body mass index is 49.35 kg/m². Physical Exam  Vitals and nursing note reviewed. Constitutional:       General: She is not in acute distress. Appearance: Normal appearance. She is normal weight. She is not ill-appearing, toxic-appearing or diaphoretic. HENT:      Head: Normocephalic and atraumatic. Nose: Nose normal.   Eyes:      General: No scleral icterus. Right eye: No discharge. Left eye: No discharge. Extraocular Movements: Extraocular movements intact. Conjunctiva/sclera: Conjunctivae normal.      Pupils: Pupils are equal, round, and reactive to light. Neck:      Vascular: No carotid bruit. Cardiovascular:      Rate and Rhythm: Normal rate and regular rhythm. Heart sounds: Normal heart sounds. No murmur heard. No friction rub. No gallop. Pulmonary:      Effort: Pulmonary effort is normal. No respiratory distress. Breath sounds: Normal breath sounds. No stridor. No wheezing, rhonchi or rales. Abdominal:      General: Abdomen is flat. Bowel sounds are normal. There is no distension. Palpations: Abdomen is soft. There is no mass. Tenderness: There is no abdominal tenderness. There is no guarding or rebound. Hernia: No hernia is present. Musculoskeletal:         General: No swelling or signs of injury. Normal range of motion. Cervical back: Normal range of motion. Right lower leg: No edema. Left lower leg: No edema. Skin:     General: Skin is warm and dry. Coloration: Skin is not jaundiced or pale. Findings: No bruising, erythema, lesion or rash. Neurological:      General: No focal deficit present. Mental Status: She is alert and oriented to person, place, and time. Psychiatric:         Mood and Affect: Mood normal.         Behavior: Behavior normal.         Thought Content: Thought content normal.         Judgment: Judgment normal.          No results found for this visit on 11/24/21.     Lab Results   Component Value Date    LABA1C 6.3 (H) 07/19/2021       Lab Results   Component Value Date    CHOL 194 03/04/2021    TRIG 107 03/04/2021    HDL 44 03/04/2021    [address] 129 03/04/2021       The ASCVD Risk score (Racheal Castellon, et al., 2013) failed to calculate for the following reasons:     The 2013 ASCVD risk score is only valid for ages 36 to 78    Lab Results   Component Value Date     03/04/2021    K 3.9 03/04/2021     03/04/2021    CO2 26 03/04/2021    BUN 15 03/04/2021    CREATININE 0.6 03/04/2021    GLUCOSE 114 (H) 03/04/2021    CALCIUM 9.5 03/04/2021    PROT 8.5 (H) 03/04/2021    LABALBU 4.3 03/04/2021    BILITOT 0.3 03/04/2021    ALKPHOS 68 03/04/2021    AST 22 03/04/2021    ALT 27 03/04/2021    LABGLOM >90 03/04/2021    GFRAA >60 05/10/2019       No results found for: LABMICR, [id number]    Lab Results   Component Value Date    TSH 1.730 03/04/2021    T4FREE 1.31 03/04/2021       Lab Results   Component Value Date    WBC 9.5 03/04/2021    HGB 14.1 03/04/2021    HCT 45.2 03/04/2021    MCV 89.2 03/04/2021     03/04/2021       Immunization History   Administered Date(s) Administered    COVID-19, Moderna, Primary or Immunocompromised, PF, 100mcg/0.5mL 03/24/2021, 04/21/2021    Tdap (Boostrix, Adacel) 07/16/2020       Health Maintenance   Topic Date Due    Hepatitis C screen  Never done    Varicella vaccine (1 of 2 - 2-dose childhood series) Never done    Flu vaccine (1) Never done    Potassium monitoring  03/04/2022    Creatinine monitoring  03/04/2022    A1C test (Diabetic or Prediabetic)  07/19/2022    Breast cancer screen  10/27/2023    Cervical cancer screen  05/18/2024    DTaP/Tdap/Td vaccine (2 - Td or Tdap) 07/16/2030    COVID-19 Vaccine  Completed    HIV screen  Completed    Hepatitis A vaccine  Aged Out    Hepatitis B vaccine  Aged Out    Hib vaccine  Aged Out    Meningococcal (ACWY) vaccine  Aged Out    Pneumococcal 0-64 years Vaccine  Aged Out          Future Appointments   Date Time Provider Odin Garcia   12/2/2021  3:40 PM DO MELISSA Mitchell  RES P - [facility]   12/10/2021  9:00 AM STR NUCLEAR MEDICINE RM3 GE INFINIA 1 STRZ NUC MED STR Radiolog   1/4/2022  1:00 PM Bridget Cardoso, DO SRPX [facility] [place]       ASSESSMENT       Diagnosis Orders   1. Calculus of gallbladder without cholecystitis without obstruction     2. Colicky RUQ abdominal pain     3. Essential hypertension  amLODIPine (NORVASC) 10 MG tablet   4. Prediabetes  metFORMIN (GLUCOPHAGE-XR) 500 MG extended release tablet   5. Morbid obesity (HCC)  metFORMIN (GLUCOPHAGE-XR) 500 MG extended release tablet   6. Chronic nonintractable headache, unspecified headache type  DISCONTINUED: topiramate (TOPAMAX) 25 MG tablet   7. Gastroesophageal reflux disease, unspecified whether esophagitis present  omeprazole (PRILOSEC) 20 MG delayed release capsule   8. Vertigo  meclizine (ANTIVERT) 12.5 MG tablet       PLAN      After discussion with pt and Dr. Meghan Alexander, we agreed on plan as follows:    Await previously ordered HIDA scan with [facility]  Continue diet, exercise, and weight loss  Restart Protonix and meclizine at this time  Check HG A1c and spot MA next visit  Follow-up in 1 month or sooner if any further problems. Attending Physician Statement  I have discussed the case, including pertinent history and exam findings with the resident. I also have seen the patient and performed key portions of the examination. I agree with the documented assessment and plan as documented by the resident.   GC modifier added to this encounter     Electronically signed by Altagracia Cuellar MD on 11/26/2021 at 9:23 AM

## 2021-11-24 NOTE — PROGRESS NOTES
[address] [facility]. [address] [place] [address]  Dept: [phone]  Loc: [phone]      Dianne Moreno (:  1985) is a 43 y.o. female,Established patient, here for evaluation of the following chief complaint(s):  Results (US- gallstone ), Dizziness (starting yesterday when she turns her head to the right to fast), Pain (right stomach pain ), and Other (discuss weightloss medication from last appointment)      ASSESSMENT/PLAN:  1. Calculus of gallbladder without cholecystitis without obstruction  2. Colicky RUQ abdominal pain  3. Essential hypertension  -     amLODIPine (NORVASC) 10 MG tablet; Take 1 tablet by mouth daily, Disp-90 tablet, R-3Normal  4. Prediabetes  -     metFORMIN (GLUCOPHAGE-XR) 500 MG extended release tablet; Take 1 tablet by mouth daily (with breakfast), Disp-90 tablet, R-3Normal  5. Morbid obesity ([address]  -     metFORMIN (GLUCOPHAGE-XR) 500 MG extended release tablet; Take 1 tablet by mouth daily (with breakfast), Disp-90 tablet, R-3Normal  6. Chronic nonintractable headache, unspecified headache type  7. Gastroesophageal reflux disease, unspecified whether esophagitis present  -     omeprazole (PRILOSEC) 20 MG delayed release capsule; Take 1 capsule by mouth every morning (before breakfast), Disp-30 capsule, R-1Normal  8. Vertigo  -     meclizine (ANTIVERT) 12.5 MG tablet; Take 1 tablet by mouth 3 times daily as needed for Dizziness, Disp-21 tablet, R-0Normal    Pt counseled to keep appt for 12/10/2021 at 9 am for HIDA scan with CCK for further evaluation of gallbladder function. Restart PPI for GERD. Restart antivert for intermittent vertigo symptoms. Amlodipine refill given today as well. Return in about 1 month (around 2021) for weight loss. SUBJECTIVE/OBJECTIVE:  HPI     Right sided abdominal pain about 4 days after starting her high protein diet for weight loss.  She ended up having an US and was told she had a gallstone. Had the study done at [facility]. Is still having RUQ pain intermittently throughout the day. Describes it as a discomfort. Worse with eating. Has lost weight the last week by avoiding fatty foods and drinking more water. Did get her period since last visit. No concerns of pregnancy right now. Is interested in starting topamax today. Review of Systems   Constitutional: Negative for chills, fatigue and fever. HENT: Negative for ear pain, postnasal drip and trouble swallowing. Eyes: Negative for pain and visual disturbance. Respiratory: Negative for cough and shortness of breath. Cardiovascular: Negative for chest pain and palpitations. Gastrointestinal: Positive for abdominal pain. Negative for blood in stool, constipation, diarrhea, nausea and vomiting. Anal bleeding: RUQ. Acid reflux   Genitourinary: Negative for dysuria. Skin: Negative for rash. Neurological: Positive for dizziness. Negative for headaches. Physical Exam  Vitals and nursing note reviewed. Constitutional:       General: She is not in acute distress. Appearance: She is well-developed. She is not diaphoretic. HENT:      Head: Normocephalic and atraumatic. Right Ear: External ear normal.      Left Ear: External ear normal.      Nose: Nose normal.   Eyes:      General: No scleral icterus. Right eye: No discharge. Left eye: No discharge. Conjunctiva/sclera: Conjunctivae normal.   Cardiovascular:      Rate and Rhythm: Normal rate and regular rhythm. Heart sounds: Normal heart sounds. No murmur heard. Pulmonary:      Effort: Pulmonary effort is normal.      Breath sounds: Normal breath sounds. Musculoskeletal:      Cervical back: Normal range of motion. Skin:     General: Skin is warm and dry. Findings: No erythema or rash. Neurological:      Mental Status: She is alert and oriented to person, place, and time. Cranial Nerves: No cranial nerve deficit. Psychiatric:         Behavior: Behavior normal.         Thought Content: Thought content normal.         Judgment: Judgment normal.           Vitals:    11/24/21 1344   BP: (!) 130/52   Site: Left Upper Arm   Position: Sitting   Cuff Size: Large Adult   Pulse: 103   Resp: 16   Temp: 97.3 °F (36.3 °C)   TempSrc: Skin   SpO2: 98%   Weight: 269 lb 12.8 oz (122.4 kg)   Height: 5' 2\" (1.575 m)     Gallbladder and liver ultrasound done 11/5/2021. Impression       1. Hepatic steatosis with focal fatty sparing adjacent to the gallbladder fossa. 2. Hepatomegaly. 3. Cholelithiasis without evidence of acute cholecystitis. An electronic signature was used to authenticate this note.     --Mitra Owens MD

## 2021-12-06 ENCOUNTER — OFFICE VISIT (OUTPATIENT)
Dept: FAMILY MEDICINE CLINIC | Age: 36
End: 2021-12-06
Payer: COMMERCIAL

## 2021-12-06 VITALS
OXYGEN SATURATION: 96 % | HEIGHT: 62 IN | RESPIRATION RATE: 16 BRPM | TEMPERATURE: 98.6 F | SYSTOLIC BLOOD PRESSURE: 132 MMHG | DIASTOLIC BLOOD PRESSURE: 84 MMHG | WEIGHT: 293 LBS | HEART RATE: 104 BPM | BODY MASS INDEX: 53.92 KG/M2

## 2021-12-06 DIAGNOSIS — R10.13 EPIGASTRIC PAIN: ICD-10-CM

## 2021-12-06 DIAGNOSIS — K21.9 GASTROESOPHAGEAL REFLUX DISEASE, UNSPECIFIED WHETHER ESOPHAGITIS PRESENT: ICD-10-CM

## 2021-12-06 DIAGNOSIS — R73.03 PREDIABETES: ICD-10-CM

## 2021-12-06 DIAGNOSIS — E66.01 MORBID (SEVERE) OBESITY DUE TO EXCESS CALORIES (HCC): ICD-10-CM

## 2021-12-06 DIAGNOSIS — I10 ESSENTIAL HYPERTENSION: ICD-10-CM

## 2021-12-06 DIAGNOSIS — K80.20 CALCULUS OF GALLBLADDER WITHOUT CHOLECYSTITIS WITHOUT OBSTRUCTION: ICD-10-CM

## 2021-12-06 DIAGNOSIS — R19.7 DIARRHEA, UNSPECIFIED TYPE: Primary | ICD-10-CM

## 2021-12-06 LAB
Lab: NORMAL
PERFORMING INSTRUMENT: NORMAL
QC PASS/FAIL: NORMAL
SARS-COV-2, POC: NORMAL

## 2021-12-06 PROCEDURE — 99214 OFFICE O/P EST MOD 30 MIN: CPT | Performed by: FAMILY MEDICINE

## 2021-12-06 PROCEDURE — 87426 SARSCOV CORONAVIRUS AG IA: CPT | Performed by: FAMILY MEDICINE

## 2021-12-06 RX ORDER — ONDANSETRON 4 MG/1
4 TABLET, ORALLY DISINTEGRATING ORAL 3 TIMES DAILY PRN
Qty: 21 TABLET | Refills: 0 | Status: SHIPPED | OUTPATIENT
Start: 2021-12-06 | End: 2021-12-13

## 2021-12-06 RX ORDER — PNV NO.95/FERROUS FUM/FOLIC AC 28MG-0.8MG
TABLET ORAL
COMMUNITY
Start: 2021-09-24 | End: 2022-04-11

## 2021-12-06 RX ORDER — TOPIRAMATE 25 MG/1
TABLET ORAL
COMMUNITY
Start: 2021-11-24 | End: 2021-12-06 | Stop reason: CLARIF

## 2021-12-06 ASSESSMENT — ENCOUNTER SYMPTOMS
BACK PAIN: 0
BLOOD IN STOOL: 0
VOMITING: 0
COUGH: 0
SHORTNESS OF BREATH: 0
EYE DISCHARGE: 0
ABDOMINAL PAIN: 1
DIARRHEA: 1
CONSTIPATION: 0
NAUSEA: 0
SORE THROAT: 0
EYE REDNESS: 0

## 2021-12-06 NOTE — LETTER
[address] [place], [place] [address]emer[place] [address]  Phone: [phone]  Fax: [phone], [place]        December 6, 2021     Patient: Malik Kumar   YOB: 1985   Date of Visit: 12/6/2021       To Whom it May Concern:    Malik Kumar was seen in my clinic on 12/6/2021. She should be off work until diarrhea is resolved. If you have any questions or concerns, please don't hesitate to call.     Sincerely,         Minus MD [place]

## 2021-12-07 LAB
ALBUMIN SERPL-MCNC: 4.3 G/DL (ref 3.5–5.1)
ALP BLD-CCNC: 76 U/L (ref 38–126)
ALT SERPL-CCNC: 28 U/L (ref 11–66)
ANION GAP SERPL CALCULATED.3IONS-SCNC: 10 MEQ/L (ref 8–16)
AST SERPL-CCNC: 24 U/L (ref 5–40)
BASOPHILS # BLD: 0.5 %
BASOPHILS ABSOLUTE: 0 THOU/MM3 (ref 0–0.1)
BILIRUB SERPL-MCNC: 0.3 MG/DL (ref 0.3–1.2)
BUN BLDV-MCNC: 12 MG/DL (ref 7–22)
CALCIUM SERPL-MCNC: 9 MG/DL (ref 8.5–10.5)
CHLORIDE BLD-SCNC: 102 MEQ/L (ref 98–111)
CHOLESTEROL, TOTAL: 178 MG/DL (ref 100–199)
CO2: 27 MEQ/L (ref 23–33)
CREAT SERPL-MCNC: 0.8 MG/DL (ref 0.4–1.2)
EOSINOPHIL # BLD: 1.5 %
EOSINOPHILS ABSOLUTE: 0.1 THOU/MM3 (ref 0–0.4)
ERYTHROCYTE [DISTWIDTH] IN BLOOD BY AUTOMATED COUNT: 13.3 % (ref 11.5–14.5)
ERYTHROCYTE [DISTWIDTH] IN BLOOD BY AUTOMATED COUNT: 44.5 FL (ref 35–45)
GFR SERPL CREATININE-BSD FRML MDRD: 80 ML/MIN/1.73M2
GLUCOSE BLD-MCNC: 130 MG/DL (ref 70–108)
HCT VFR BLD CALC: 44.2 % (ref 37–47)
HDLC SERPL-MCNC: 39 MG/DL
HEMOGLOBIN: 14 GM/DL (ref 12–16)
IMMATURE GRANS (ABS): 0.04 THOU/MM3 (ref 0–0.07)
IMMATURE GRANULOCYTES: 0.4 %
LDL CHOLESTEROL CALCULATED: 100 MG/DL
LIPASE: 38.1 U/L (ref 5.6–51.3)
LYMPHOCYTES # BLD: 17.9 %
LYMPHOCYTES ABSOLUTE: 1.7 THOU/MM3 (ref 1–4.8)
MCH RBC QN AUTO: 28.9 PG (ref 26–33)
MCHC RBC AUTO-ENTMCNC: 31.7 GM/DL (ref 32.2–35.5)
MCV RBC AUTO: 91.1 FL (ref 81–99)
MONOCYTES # BLD: 6.7 %
MONOCYTES ABSOLUTE: 0.6 THOU/MM3 (ref 0.4–1.3)
NUCLEATED RED BLOOD CELLS: 0 /100 WBC
PLATELET # BLD: 291 THOU/MM3 (ref 130–400)
PMV BLD AUTO: 10.3 FL (ref 9.4–12.4)
POTASSIUM SERPL-SCNC: 4 MEQ/L (ref 3.5–5.2)
RBC # BLD: 4.85 MILL/MM3 (ref 4.2–5.4)
SEG NEUTROPHILS: 73 %
SEGMENTED NEUTROPHILS ABSOLUTE COUNT: 7.1 THOU/MM3 (ref 1.8–7.7)
SODIUM BLD-SCNC: 139 MEQ/L (ref 135–145)
TOTAL PROTEIN: 7.3 G/DL (ref 6.1–8)
TRIGL SERPL-MCNC: 196 MG/DL (ref 0–199)
WBC # BLD: 9.7 THOU/MM3 (ref 4.8–10.8)

## 2021-12-07 NOTE — PROGRESS NOTES
[address] [address]  Dept: [phone]  Dept Fax: [phone]  Loc: [phone]      Beata Lam is a 43 y.o. female who presents todayfor her medical conditions/complaints as noted below. Beata Lam is c/o of Diarrhea and Abdominal Pain      :     HPI     English speaking patient here today. Wants additional explanation about gallstones and plan. Now with diarrhea since Monday. Also with pain but in the mouth of the stomach. Pain is not associated with food. Today has not eaten much. Has had diarrhea since yesterday. Has gone a bunch today. Right now no pain in right upper quadrant. Did have right upper quadrant pin in the past. Gone now. Has limited fat and this right upper quadrant pain is gone. Left work today with pain. Had COVID-19 last year. Did get nausea. Came and went. More than 10 episodes of diarrhea today. Patient Active Problem List   Diagnosis    Impaired fasting glucose    Morbid (severe) obesity due to excess calories (HCC)    Essential hypertension     Goals      Exercise 3x per week (30 min per time)         The patient has No Known Allergies. Medical History  Ottoniel Cruz has a past medical history of Gallstone and Hypertension. Past SurgicalHistory  The patient  has a past surgical history that includes  section (). Family History  This patient's family history includes Breast Cancer (age of onset: 50) in her mother; Diabetes in her mother; Kidney Disease in her father. Social History  Ottoniel Cruz  reports that she has never smoked. She has never used smokeless tobacco. She reports that she does not drink alcohol and does not use drugs.     Medications    Current Outpatient Medications:     Prenatal Vit-Fe Fumarate-FA (PRENATAL VITAMIN) 27-0.8 MG TABS, Take by mouth, Disp: , Rfl:     ondansetron (ZOFRAN-ODT) 4 MG disintegrating tablet, Take 1 tablet by mouth 3 times daily as needed for Nausea or Vomiting, Disp: 21 tablet, Rfl: 0    amLODIPine (NORVASC) 10 MG tablet, Take 1 tablet by mouth daily, Disp: 90 tablet, Rfl: 3    metFORMIN (GLUCOPHAGE-XR) 500 MG extended release tablet, Take 1 tablet by mouth daily (with breakfast), Disp: 90 tablet, Rfl: 3    omeprazole (PRILOSEC) 20 MG delayed release capsule, Take 1 capsule by mouth every morning (before breakfast), Disp: 30 capsule, Rfl: 1    Subjective:      Review of Systems   Constitutional: Positive for chills and fatigue. Negative for fever and unexpected weight change. HENT: Negative for congestion, ear discharge, ear pain, hearing loss and sore throat. Eyes: Negative for discharge and redness. Respiratory: Negative for cough and shortness of breath. Cardiovascular: Negative for chest pain and palpitations. Gastrointestinal: Positive for abdominal pain and diarrhea. Negative for blood in stool, constipation, nausea and vomiting. Genitourinary: Negative for difficulty urinating and dysuria. Musculoskeletal: Negative for arthralgias, back pain, gait problem and neck pain. Skin: Negative for rash. Allergic/Immunologic: Negative for environmental allergies. Neurological: Negative for headaches. Psychiatric/Behavioral: Positive for sleep disturbance (with shift work and waking up with kid). Negative for dysphoric mood. The patient is not nervous/anxious. Objective:     Vitals:    12/06/21 1847   BP: 132/84   Site: Left Upper Arm   Position: Sitting   Pulse: 104   Resp: 16   Temp: 98.6 °F (37 °C)   TempSrc: Temporal   SpO2: 96%   Weight: 295 lb (133.8 kg)   Height: 5' 2\" (1.575 m)       Physical Exam  Vitals reviewed. Constitutional:       General: She is not in acute distress. Appearance: She is well-developed. She is not ill-appearing or toxic-appearing. HENT:      Head: Normocephalic and atraumatic.       Nose: No congestion or rhinorrhea. Mouth/Throat:      Mouth: Mucous membranes are moist.      Pharynx: Oropharynx is clear. No oropharyngeal exudate or posterior oropharyngeal erythema. Eyes:      General:         Right eye: No discharge. Left eye: No discharge. Conjunctiva/sclera: Conjunctivae normal.      Pupils: Pupils are equal, round, and reactive to light. Cardiovascular:      Rate and Rhythm: Normal rate and regular rhythm. Heart sounds: Normal heart sounds. Pulmonary:      Effort: Pulmonary effort is normal. No respiratory distress. Breath sounds: Normal breath sounds. Abdominal:      General: Bowel sounds are normal. There is no distension. Palpations: Abdomen is soft. There is no mass. Tenderness: There is abdominal tenderness. There is no right CVA tenderness, left CVA tenderness, guarding or rebound. Hernia: No hernia is present. Musculoskeletal:      Cervical back: Neck supple. Skin:     General: Skin is warm and dry. Comments: No obvious rash. Neurological:      Mental Status: She is alert. Comments: No obvious focal deficit. Psychiatric:         Behavior: Behavior normal.     Jumps without pain; non-surgical abdomen. Lab Results   Component Value Date    WBC 9.5 03/04/2021    HGB 14.1 03/04/2021    HCT 45.2 03/04/2021     03/04/2021    CHOL 194 03/04/2021    TRIG 107 03/04/2021    HDL 44 03/04/2021    ALT 27 03/04/2021    AST 22 03/04/2021     03/04/2021    K 3.9 03/04/2021     03/04/2021    CREATININE 0.6 03/04/2021    BUN 15 03/04/2021    CO2 26 03/04/2021    TSH 1.730 03/04/2021    LABA1C 6.3 (H) 07/19/2021       Earlean Priestly:   1. Diarrhea, unspecified type  I actually suspect gastroenteritis as cause of symptoms today. COVID-19 negative. No bloody or black diarrhea.  wend on to develop similar symptoms (patient messaged me with questions today).   Will treat nausea with zofran (reassured her today that this can Standing Expiration Date:   12/6/2022    Comprehensive Metabolic Panel     Standing Status:   Future     Number of Occurrences:   1     Standing Expiration Date:   12/6/2022    Lipase     Standing Status:   Future     Number of Occurrences:   1     Standing Expiration Date:   12/6/2022    Lipid Panel     Standing Status:   Future     Number of Occurrences:   1     Standing Expiration Date:   12/6/2022     Order Specific Question:   Is Patient Fasting?/# of Hours     Answer:   0    COVID-19     Standing Status:   Future     Number of Occurrences:   1     Standing Expiration Date:   12/6/2022     Scheduling Instructions:      1) Due to current limited availability of the COVID-19 test, tests will be prioritized based on responses to questions above. Testing may be delayed due to volume. 2) Print and instruct patient to adhere to CDC home isolation program. (Link Above)              3) Set up or refer patient for a monitoring program.              4) Have patient sign up for and leverage MyChart (if not previously done). Order Specific Question:   Is this test for diagnosis or screening? Answer:   Diagnosis of ill patient     Order Specific Question:   Symptomatic for COVID-19 as defined by CDC? Answer:   Yes     Order Specific Question:   Date of Symptom Onset     Answer:   12/5/2021     Order Specific Question:   Hospitalized for COVID-19? Answer:   No     Order Specific Question:   Admitted to ICU for COVID-19? Answer:   No     Order Specific Question:   Employed in healthcare setting? Answer:   No     Order Specific Question:   Resident in a congregate (group) care setting? Answer:   No     Order Specific Question:   Pregnant? Answer:   No     Order Specific Question:   Previously tested for COVID-19? Answer:   Unknown    H.  Pylori Antigen, Stool     Standing Status:   Future     Standing Expiration Date:   12/7/2022    POCT COVID-19, Antigen     Order Specific Question:   Is this test for diagnosis or screening? Answer:   Diagnosis of ill patient     Order Specific Question:   Symptomatic for COVID-19 as defined by CDC? Answer:   Yes     Order Specific Question:   Date of Symptom Onset     Answer:   12/5/2021     Order Specific Question:   Hospitalized for COVID-19? Answer:   No     Order Specific Question:   Admitted to ICU for COVID-19? Answer:   No     Order Specific Question:   Employed in healthcare setting? Answer:   No     Order Specific Question:   Resident in a congregate (group) care setting? Answer:   No     Order Specific Question:   Pregnant? Answer:   No     Order Specific Question:   Previously tested for COVID-19? Answer:   Unknown       Prescriptions given/sent  Orders Placed This Encounter   Medications    ondansetron (ZOFRAN-ODT) 4 MG disintegrating tablet     Sig: Take 1 tablet by mouth 3 times daily as needed for Nausea or Vomiting     Dispense:  21 tablet     Refill:  0       Patient given educational materials - see patient instructions. Discussed use, benefit, and side effects of recommended medications. All patient questions answered. Pt voiced understanding.           Electronically signed by Susan Steward MD on 12/7/2021 at 3:35 PM

## 2021-12-09 LAB
SARS-COV-2: NOT DETECTED
SOURCE: NORMAL

## 2021-12-10 ENCOUNTER — NURSE ONLY (OUTPATIENT)
Dept: FAMILY MEDICINE CLINIC | Age: 36
End: 2021-12-10

## 2021-12-10 ENCOUNTER — TELEPHONE (OUTPATIENT)
Dept: FAMILY MEDICINE CLINIC | Age: 36
End: 2021-12-10

## 2021-12-10 DIAGNOSIS — R19.7 DIARRHEA, UNSPECIFIED TYPE: Primary | ICD-10-CM

## 2021-12-10 DIAGNOSIS — R19.7 DIARRHEA, UNSPECIFIED TYPE: ICD-10-CM

## 2021-12-10 LAB
Lab: NORMAL
QC PASS/FAIL: NORMAL
SARS-COV-2 RDRP RESP QL NAA+PROBE: NEGATIVE
TSH SERPL DL<=0.05 MIU/L-ACNC: 2.3 UIU/ML (ref 0.4–4.2)

## 2021-12-10 PROCEDURE — 87635 SARS-COV-2 COVID-19 AMP PRB: CPT | Performed by: FAMILY MEDICINE

## 2021-12-10 PROCEDURE — 99999 PR OFFICE/OUTPT VISIT,PROCEDURE ONLY: CPT | Performed by: NURSE PRACTITIONER

## 2021-12-10 NOTE — TELEPHONE ENCOUNTER
Patient reports ongoing diarrhea. Please give patient containers for stool specimens including h plyori ordered on prior day. Give her letter drafted toay. Check to see if we can add TTG and TSH to labs drawn earlier this week. Repeat COVID-19. Schedule for re-check Monday. Thanks. Call my cell if questions.

## 2021-12-10 NOTE — LETTER
[address] [address]  Phone: [phone]  Fax: [phone], [place]        December 10, 2021     Patient: Manuel Hdz   YOB: 1985   Date of Visit: 12/10/2021       To Whom it May Concern:    Manuel Hdz was seen in my clinic 6/12/2021. COVID-19 was negative. She is off work for ongoing diarrhea and work-up is ongoing. If you have any questions or concerns, please don't hesitate to call.     Sincerely,         Alysha Yadav MD

## 2021-12-11 LAB — TISSUE TRANSGLUTAMINASE IGA: 1 U/ML

## 2021-12-13 ENCOUNTER — OFFICE VISIT (OUTPATIENT)
Dept: FAMILY MEDICINE CLINIC | Age: 36
End: 2021-12-13
Payer: COMMERCIAL

## 2021-12-13 VITALS
DIASTOLIC BLOOD PRESSURE: 74 MMHG | RESPIRATION RATE: 16 BRPM | OXYGEN SATURATION: 98 % | SYSTOLIC BLOOD PRESSURE: 128 MMHG | HEART RATE: 86 BPM | WEIGHT: 293 LBS | TEMPERATURE: 97.9 F | BODY MASS INDEX: 54.4 KG/M2

## 2021-12-13 DIAGNOSIS — A04.5 CAMPYLOBACTER DIARRHEA: ICD-10-CM

## 2021-12-13 DIAGNOSIS — R10.13 EPIGASTRIC PAIN: ICD-10-CM

## 2021-12-13 DIAGNOSIS — R19.7 DIARRHEA, UNSPECIFIED TYPE: ICD-10-CM

## 2021-12-13 DIAGNOSIS — K80.20 CALCULUS OF GALLBLADDER WITHOUT CHOLECYSTITIS WITHOUT OBSTRUCTION: Primary | ICD-10-CM

## 2021-12-13 DIAGNOSIS — I10 ESSENTIAL HYPERTENSION: ICD-10-CM

## 2021-12-13 PROCEDURE — 99214 OFFICE O/P EST MOD 30 MIN: CPT | Performed by: FAMILY MEDICINE

## 2021-12-13 ASSESSMENT — ENCOUNTER SYMPTOMS
CONSTIPATION: 0
EYE DISCHARGE: 0
BLOOD IN STOOL: 0
SHORTNESS OF BREATH: 0
SORE THROAT: 0
ABDOMINAL PAIN: 0
EYE REDNESS: 0
VOMITING: 0
COUGH: 0
DIARRHEA: 0
NAUSEA: 0
BACK PAIN: 0

## 2021-12-13 NOTE — PROGRESS NOTES
redness. Respiratory: Negative for cough and shortness of breath. Cardiovascular: Negative for chest pain and palpitations. Gastrointestinal: Negative for abdominal pain, blood in stool, constipation, diarrhea, nausea and vomiting. Genitourinary: Negative for difficulty urinating and dysuria. Musculoskeletal: Negative for arthralgias, back pain, gait problem and neck pain. Skin: Negative for rash. Allergic/Immunologic: Negative for environmental allergies. Neurological: Negative for headaches. Psychiatric/Behavioral: Negative for dysphoric mood and sleep disturbance. The patient is not nervous/anxious. :     Vitals:    12/13/21 1145   BP: 128/74   Site: Right Upper Arm   Pulse: 86   Resp: 16   Temp: 97.9 °F (36.6 °C)   TempSrc: Skin   SpO2: 98%   Weight: 297 lb 6.4 oz (134.9 kg)       Physical Exam  Vitals reviewed. Constitutional:       General: She is not in acute distress. Appearance: She is well-developed. HENT:      Head: Normocephalic and atraumatic. Eyes:      Conjunctiva/sclera: Conjunctivae normal.   Cardiovascular:      Rate and Rhythm: Normal rate and regular rhythm. Heart sounds: Normal heart sounds. Pulmonary:      Effort: Pulmonary effort is normal. No respiratory distress. Breath sounds: Normal breath sounds. Abdominal:      General: Bowel sounds are normal. There is no distension. Palpations: Abdomen is soft. There is no mass. Tenderness: There is no abdominal tenderness. There is no guarding or rebound. Hernia: No hernia is present. Musculoskeletal:      Cervical back: Neck supple. Skin:     General: Skin is warm and dry. Comments: No obvious rash. Neurological:      Mental Status: She is alert. Comments: No obvious focal deficit. Psychiatric:         Behavior: Behavior normal.         Assessment/Plan:   1. Campylobacter diarrhea  Resolved. No action needed.  Indications for prompt return and/or emergent presentation if worsening reviewed in detail. 2. Diarrhea, unspecified type  As per #1. If symptoms recur consider GI referral but I suspect campylobacter as cause; poorly cooked chicken. Reviewed food safety.    - Calprotectin Stool  - Fecal Lactoferrin  - Blood Occult Stool Screen #1  - GI Bacterial Pathogens By PCR    3. Epigastric pain  Resolved. H. Pylori negative. Will follow.    - H. Pylori Antigen, Stool    4. Calculus of gallbladder without cholecystitis without obstruction  Avoiding fat with symptoms resolution at this time. Will go for HIDA. 5. Essential hypertension  At goal.          Return if symptoms worsen or fail to improve, for Annual wellness visit. Orders Placed  No orders of the defined types were placed in this encounter. Prescriptions given/sent   No orders of the defined types were placed in this encounter. Patient instructions given and reviewed. Discussed use, benefit, and side effects of recommended medications. All patient questions answered. Pt voiced understanding.           Electronically signed by Андрей Tabor MD on 12/20/2021at 8:25 PM

## 2021-12-13 NOTE — LETTER
[address] [place] [address]  Phone: [phone]  Fax: [phone]    Shruthi Aguilar MD        December 13, 2021     Patient: Panfilo Gilbert   YOB: 1985   Date of Visit: 12/13/2021       To Whom it May Concern:    Panfilo Gilbert was seen in my clinic on 12/13/2021. She may return to work without restrictions on 12/14/2021. If you have any questions or concerns, please don't hesitate to call.     Sincerely,         Shruthi Aguilar MD

## 2021-12-14 ENCOUNTER — TELEPHONE (OUTPATIENT)
Dept: FAMILY MEDICINE CLINIC | Age: 36
End: 2021-12-14

## 2021-12-14 LAB
ADENOVIRUS F 40 41 PCR: NOT DETECTED
ASTROVIRUS PCR: NOT DETECTED
CAMPYLOBACTER PCR: DETECTED
CLOSTRIDIUM DIFFICILE, PCR: NOT DETECTED
CRYPTOSPORIDIUM PCR: NOT DETECTED
CYCLOSPORA CAYETANENSIS PCR: NOT DETECTED
E COLI 0157 PCR: ABNORMAL
E COLI ENTEROAGGREGATIVE PCR: NOT DETECTED
E COLI ENTEROPATHOGENIC PCR: NOT DETECTED
E COLI ENTEROTOXIGENIC PCR: NOT DETECTED
E COLI SHIGA LIKE TOXIN PCR: NOT DETECTED
E COLI SHIGELLA/ENTEROINVASIVE PCR: NOT DETECTED
E HISTOLYTICA GI FILM ARRAY: NOT DETECTED
GIARDIA LAMBLIA PCR: NOT DETECTED
HEMOCCULT STL QL: NEGATIVE
NOROVIRUS GI GII PCR: NOT DETECTED
PLESIOMONAS SHIGELLOIDES PCR: NOT DETECTED
ROTAVIRUS A PCR: NOT DETECTED
SALMONELLA PCR: NOT DETECTED
SAPOVIRUS PCR: NOT DETECTED
VIBRIO CHOLERAE PCR: NOT DETECTED
VIBRIO PCR: NOT DETECTED
YERSINIA ENTEROCOLITICA PCR: NOT DETECTED

## 2021-12-14 NOTE — TELEPHONE ENCOUNTER
Called and chatted with patient. Symptoms are gone so no treatment is needed. She did note that they ate chicken at a friend's home the day prior to symptom onset. Reviewed proper cooking of chicken is necessary to avoid similar issues the future. Questions answered.

## 2021-12-14 NOTE — TELEPHONE ENCOUNTER
Iris Garcia from the lab called with a critical value on GI panel - campylobacter. Please advise. Thank you.

## 2021-12-15 LAB — HELICOBACTER PYLORI ANTIGEN, STOOL: NORMAL

## 2021-12-18 LAB — LACTOFERRIN, FECAL: NORMAL

## 2021-12-21 LAB — CALPROTECTIN: 143 UG/G

## 2022-01-03 NOTE — PROGRESS NOTES
Manuel Jean is a 43 y.o. female who presents today for:  Chief Complaint   Patient presents with    Follow-up     Pt presents for a 1 month f/u for weight loss. HPI:   Manuel eJan is 43 y.o. who presents today for discussion of weight loss. She has been decreasing her sugar intake. She is not tracking her calories. She eats breakfast, lunch, dinner and a snack. She does not drink sugar sweetened beverages. She has limited physical activity. She is not interested in surgery at this time. She tried the 20/30 weight loss program, and was unable to keep up with that due to her job. She would like to start medication. However, due to history of high blood pressure phentermine is not an option. And cost limits use of Saxenda. Also notes gall stones previously. Has HIDA coming up on 1/10. No current pain. Hx of dizziness with head movement. No improvement with meclizine.     Objective:     Vitals:    01/04/22 1257   BP: 122/78   Pulse: 77   Resp: 18   Temp: 97 °F (36.1 °C)   SpO2: 98%   Weight: 300 lb (136.1 kg)   Height: 5' 2\" (1.575 m)       Wt Readings from Last 3 Encounters:   01/04/22 300 lb (136.1 kg)   12/13/21 297 lb 6.4 oz (134.9 kg)   12/06/21 295 lb (133.8 kg)       BP Readings from Last 3 Encounters:   01/04/22 122/78   12/13/21 128/74   12/06/21 132/84       Lab Results   Component Value Date    WBC 9.7 12/07/2021    HGB 14.0 12/07/2021    HCT 44.2 12/07/2021    MCV 91.1 12/07/2021     12/07/2021     Lab Results   Component Value Date     12/07/2021    K 4.0 12/07/2021     12/07/2021    CO2 27 12/07/2021    BUN 12 12/07/2021    CREATININE 0.8 12/07/2021    GLUCOSE 130 (H) 12/07/2021    CALCIUM 9.0 12/07/2021    PROT 7.3 12/07/2021    LABALBU 4.3 12/07/2021    BILITOT 0.3 12/07/2021    ALKPHOS 76 12/07/2021    AST 24 12/07/2021    ALT 28 12/07/2021    LABGLOM 80 (A) 12/07/2021    GFRAA >60 05/10/2019     Lab Results   Component Value Date    TSH 2.300 12/07/2021    T4FREE 1.31 03/04/2021     Lab Results   Component Value Date    LABA1C 6.3 (H) 07/19/2021     No results found for: EAG  Lab Results   Component Value Date    CHOL 178 12/07/2021    CHOL 194 03/04/2021    CHOL 202 (H) 02/12/2020     Lab Results   Component Value Date    TRIG 196 12/07/2021    TRIG 107 03/04/2021    TRIG 166 02/12/2020     Lab Results   Component Value Date    HDL 39 12/07/2021    HDL 44 03/04/2021    HDL 42 02/12/2020     Lab Results   Component Value Date    LDLCHOLESTEROL 118 05/10/2019    LDLCALC 100 12/07/2021    LDLCALC 129 03/04/2021    LDLCALC 127 02/12/2020       No results found for: LABMICR, [id number]    Review of Systems   Constitutional: Negative for chills, fatigue and fever. HENT: Negative for congestion, ear pain, postnasal drip and trouble swallowing. Eyes: Negative for pain and visual disturbance. Respiratory: Negative for cough and shortness of breath. Cardiovascular: Negative for chest pain and palpitations. Gastrointestinal: Negative for abdominal pain, blood in stool, constipation and diarrhea. Genitourinary: Negative for dysuria and hematuria. Skin: Negative for rash and wound. Neurological: Positive for dizziness. Negative for speech difficulty, weakness and headaches. Psychiatric/Behavioral: The patient is not nervous/anxious. Physical Exam  Vitals and nursing note reviewed. Constitutional:       General: She is not in acute distress. Appearance: She is well-developed. She is obese. She is not diaphoretic. HENT:      Head: Normocephalic and atraumatic. Right Ear: External ear normal.      Left Ear: External ear normal.      Nose: Nose normal.   Eyes:      General: No scleral icterus. Right eye: No discharge. Left eye: No discharge. Conjunctiva/sclera: Conjunctivae normal.   Cardiovascular:      Rate and Rhythm: Normal rate and regular rhythm. Heart sounds: Normal heart sounds.  No murmur heard.      Pulmonary:      Effort: Pulmonary effort is normal.      Breath sounds: Normal breath sounds. Abdominal:      Palpations: Abdomen is soft. Tenderness: There is no abdominal tenderness. Musculoskeletal:      Cervical back: Normal range of motion. Skin:     General: Skin is warm and dry. Findings: No erythema or rash. Neurological:      Mental Status: She is alert and oriented to person, place, and time. Psychiatric:         Behavior: Behavior normal.         Thought Content: Thought content normal.         Judgment: Judgment normal.         Immunization History   Administered Date(s) Administered    COVID-19, Moderna, Primary or Immunocompromised, PF, 100mcg/0.5mL 03/24/2021, 04/21/2021    Influenza Virus Vaccine 10/15/2021    Tdap (Boostrix, Adacel) 07/16/2020       Health Maintenance Due   Topic Date Due    Hepatitis C screen  Never done    COVID-19 Vaccine (3 - Booster for Moderna series) 10/21/2021          Food Insecurity: Food Insecurity Present    Worried About Running Out of Food in the Last Year: Never true    Lebron of Food in the Last Year: Sometimes true       Assessment / Plan:      Diagnosis Orders   1. Morbid (severe) obesity due to excess calories [facility]. [facility]   2. Benign paroxysmal positional vertigo, unspecified laterality  [facility] - [facility]   3. Calculus of gallbladder without cholecystitis without obstruction  Carl Hester MD, General Surgery, [facility]     Recommended calorie tracking, eating at 500 daily calorie deficit  Saxenda not an option at this time due to cost. Phentermine CI due to HTN. Refer to weight mgmnt center for individualized nutrition support and intensive counseling.    Refer to Gen Surg for cholelithiasis for elective cholecysetomy in future  Refer to PT for BPPV  Booster tomorrow for COVID as scheduled      Return in about 6 weeks (around 2/15/2022) for weight .          Medications Prescribed:  No orders of the defined types were placed in this encounter. Future Appointments   Date Time Provider Odin Garcia   1/10/2022  9:30 AM [facility] NUCLEAR MEDICINE RM3 GE INFINIA 1 [facility]       Patient given educational materials - see patient instructions. Discussed use, benefit, and sideeffects of prescribed medications. All patient questions answered. Pt voiced understanding. Reviewed health maintenance. Instructed to continue current medications, diet and exercise. Patient agreed with treatment plan. Follow up as directed.      Electronically signed by Madison Pritchard DO on 1/4/2022 at 1:36 PM

## 2022-01-04 ENCOUNTER — OFFICE VISIT (OUTPATIENT)
Dept: FAMILY MEDICINE CLINIC | Age: 37
End: 2022-01-04
Payer: COMMERCIAL

## 2022-01-04 VITALS
OXYGEN SATURATION: 98 % | TEMPERATURE: 97 F | HEIGHT: 62 IN | SYSTOLIC BLOOD PRESSURE: 122 MMHG | WEIGHT: 293 LBS | DIASTOLIC BLOOD PRESSURE: 78 MMHG | RESPIRATION RATE: 18 BRPM | HEART RATE: 77 BPM | BODY MASS INDEX: 53.92 KG/M2

## 2022-01-04 DIAGNOSIS — H81.10 BENIGN PAROXYSMAL POSITIONAL VERTIGO, UNSPECIFIED LATERALITY: ICD-10-CM

## 2022-01-04 DIAGNOSIS — K80.20 CALCULUS OF GALLBLADDER WITHOUT CHOLECYSTITIS WITHOUT OBSTRUCTION: ICD-10-CM

## 2022-01-04 DIAGNOSIS — E66.01 MORBID (SEVERE) OBESITY DUE TO EXCESS CALORIES (HCC): Primary | ICD-10-CM

## 2022-01-04 PROCEDURE — 99213 OFFICE O/P EST LOW 20 MIN: CPT | Performed by: STUDENT IN AN ORGANIZED HEALTH CARE EDUCATION/TRAINING PROGRAM

## 2022-01-04 ASSESSMENT — ENCOUNTER SYMPTOMS
DIARRHEA: 0
ABDOMINAL PAIN: 0
BLOOD IN STOOL: 0
SHORTNESS OF BREATH: 0
EYE PAIN: 0
TROUBLE SWALLOWING: 0
CONSTIPATION: 0
COUGH: 0

## 2022-01-04 NOTE — LETTER
[address] [place] [address],[address] [facility] [address] [place] [address]  Phone: [phone]  Fax: [address], [place]        January 4, 2022     Patient: Najma Rousseau   YOB: 1985   Date of Visit: 1/4/2022       To Whom It May Concern:     Darline Eckert was seen in my office on 1/4/2022. If you have any questions or concerns, please don't hesitate to call.     Sincerely,        Maikol Oconnell, DO

## 2022-01-04 NOTE — PROGRESS NOTES
S: 43 y.o. female with   Chief Complaint   Patient presents with    Follow-up     Pt presents for a 1 month f/u for weight loss. -Morbid obesity:  Working on wt loss  Failed various methods  Ok seeing bariatrics. -Vertigo:  sxs started in NOV  With position changes only and turning head  Failed meclizine      -Gallstones:  Noted on US  Had pain previous, c/w billiary colic prior, none recently  Willing to see surgeon. BP Readings from Last 3 Encounters:   01/04/22 122/78   12/13/21 128/74   12/06/21 132/84     Wt Readings from Last 3 Encounters:   01/04/22 300 lb (136.1 kg)   12/13/21 297 lb 6.4 oz (134.9 kg)   12/06/21 295 lb (133.8 kg)       O: VS:  height is 5' 2\" (1.575 m) and weight is 300 lb (136.1 kg). Her temperature is 97 °F (36.1 °C). Her blood pressure is 122/78 and her pulse is 77. Her respiration is 18 and oxygen saturation is 98%. Lab Results   Component Value Date    WBC 9.7 12/07/2021    HGB 14.0 12/07/2021    HCT 44.2 12/07/2021    MCV 91.1 12/07/2021     12/07/2021     Lab Results   Component Value Date     12/07/2021    K 4.0 12/07/2021    K 3.8 01/29/2020     12/07/2021    CO2 27 12/07/2021    BUN 12 12/07/2021    CREATININE 0.8 12/07/2021    GLUCOSE 130 12/07/2021    CALCIUM 9.0 12/07/2021      Lab Results   Component Value Date    LABA1C 6.3 07/19/2021    LABA1C 6.3 03/05/2021    LABA1C 6.3 12/04/2020    LABA1C 6.4 07/16/2020    LABA1C 6.1 02/12/2020        Diagnosis Orders   1. Morbid (severe) obesity due to excess calories [facility]. [facility]   2. Benign paroxysmal positional vertigo, unspecified laterality  [facility] - [facility]   3. Calculus of gallbladder without cholecystitis without obstruction  Frank Francisco MD, General Surgery, [facility]  -Morbid obesity: con't lifestyle changes. Refer to wt management. -BPPV: hx c/w this.  Agree with vestibular rehab referral  -GB stones/billiary colic: refer to gen surg      Health Maintenance Due   Topic Date Due    Hepatitis C screen  Never done    COVID-19 Vaccine (3 - Booster for Ramin Gallus series) 10/21/2021       Attending Physician Statement  I have discussed the case, including pertinent history and exam findings with the resident. I agree with the documented assessment and plan as documented by the resident.   GE modifier added to this encounter      Aguilar Reagan DO 1/4/2022 2:07 PM

## 2022-01-28 ENCOUNTER — HOSPITAL ENCOUNTER (OUTPATIENT)
Dept: NUCLEAR MEDICINE | Age: 37
Discharge: HOME | End: 2022-01-28
Payer: COMMERCIAL

## 2022-01-28 DIAGNOSIS — R10.11 ABDOMINAL PAIN, RIGHT UPPER QUADRANT: ICD-10-CM

## 2022-01-28 PROCEDURE — 78226 HEPATOBILIARY SYSTEM IMAGING: CPT

## 2022-01-28 PROCEDURE — A9537 TC99M MEBROFENIN: HCPCS | Performed by: NURSE PRACTITIONER

## 2022-01-28 PROCEDURE — 3430000000 HC RX DIAGNOSTIC RADIOPHARMACEUTICAL: Performed by: NURSE PRACTITIONER

## 2022-01-28 RX ADMIN — Medication 8.4 MILLICURIE: at 07:25

## 2022-02-07 ENCOUNTER — OFFICE VISIT (OUTPATIENT)
Dept: FAMILY MEDICINE CLINIC | Age: 37
End: 2022-02-07
Payer: COMMERCIAL

## 2022-02-07 VITALS
BODY MASS INDEX: 54.14 KG/M2 | WEIGHT: 293 LBS | SYSTOLIC BLOOD PRESSURE: 168 MMHG | DIASTOLIC BLOOD PRESSURE: 78 MMHG | OXYGEN SATURATION: 97 % | TEMPERATURE: 97.7 F | HEART RATE: 99 BPM | RESPIRATION RATE: 16 BRPM

## 2022-02-07 DIAGNOSIS — M54.2 NECK PAIN: ICD-10-CM

## 2022-02-07 DIAGNOSIS — R89.9 ABNORMAL LABORATORY TEST: ICD-10-CM

## 2022-02-07 DIAGNOSIS — E66.01 MORBID (SEVERE) OBESITY DUE TO EXCESS CALORIES (HCC): ICD-10-CM

## 2022-02-07 DIAGNOSIS — H81.10 BENIGN PAROXYSMAL POSITIONAL VERTIGO, UNSPECIFIED LATERALITY: ICD-10-CM

## 2022-02-07 DIAGNOSIS — I10 ESSENTIAL HYPERTENSION: ICD-10-CM

## 2022-02-07 DIAGNOSIS — E11.69 TYPE 2 DIABETES MELLITUS WITH OTHER SPECIFIED COMPLICATION, WITHOUT LONG-TERM CURRENT USE OF INSULIN (HCC): Primary | ICD-10-CM

## 2022-02-07 DIAGNOSIS — F41.9 ANXIETY: ICD-10-CM

## 2022-02-07 DIAGNOSIS — R12 HEARTBURN: ICD-10-CM

## 2022-02-07 DIAGNOSIS — R42 DIZZINESS: ICD-10-CM

## 2022-02-07 LAB — HBA1C MFR BLD: 6.3 %

## 2022-02-07 PROCEDURE — 83036 HEMOGLOBIN GLYCOSYLATED A1C: CPT | Performed by: FAMILY MEDICINE

## 2022-02-07 PROCEDURE — 99214 OFFICE O/P EST MOD 30 MIN: CPT | Performed by: FAMILY MEDICINE

## 2022-02-07 RX ORDER — MECLIZINE HYDROCHLORIDE 25 MG/1
25 TABLET ORAL 3 TIMES DAILY PRN
Qty: 30 TABLET | Refills: 0 | Status: SHIPPED | OUTPATIENT
Start: 2022-02-07 | End: 2022-02-17

## 2022-02-07 RX ORDER — OMEPRAZOLE 20 MG/1
20 CAPSULE, DELAYED RELEASE ORAL
Qty: 90 CAPSULE | Refills: 1 | Status: SHIPPED | OUTPATIENT
Start: 2022-02-07 | End: 2022-04-11 | Stop reason: SDUPTHER

## 2022-02-07 RX ORDER — CYCLOBENZAPRINE HCL 5 MG
5 TABLET ORAL 2 TIMES DAILY PRN
Qty: 20 TABLET | Refills: 0 | Status: SHIPPED | OUTPATIENT
Start: 2022-02-07 | End: 2022-02-17

## 2022-02-07 ASSESSMENT — ENCOUNTER SYMPTOMS
EYE DISCHARGE: 0
VOMITING: 0
BACK PAIN: 0
COUGH: 0
SORE THROAT: 0
SHORTNESS OF BREATH: 0
CONSTIPATION: 0
DIARRHEA: 0
EYE REDNESS: 0
NAUSEA: 0
ABDOMINAL PAIN: 0

## 2022-02-07 NOTE — PATIENT INSTRUCTIONS
síntomas. Paso 4    1. El médico o fisioterapeuta luego le ayudará a sentarse de [place]. Nadya piernas colgarán fuera de la cunha hacia el mismo lado al que usted estaba mirando. La atención de seguimiento es andry parte clave de santos tratamiento y seguridad. Asegúrese de hacer y acudir a todas las citas, y llame a santos médico si está teniendo problemas. También es andry buena idea saber los resultados de nadya exámenes y mantener andry lista de los medicamentos que roselia. ¿Dónde puede encontrar más información en [facility]? Harvey Marrero a [email or web address] e ingrese a santos cuenta de MyChart. Javier Cote B138 en el Lissette William \"Search Health Information\" para más información (en [facility]) sobre \"Maniobra de Epley para el vértigo: Ejercicios. \"     Si no tiene andry cuenta, dotty elder en el enlace \"Sign Up Now\". Revisado: 8 septiembre, 2021               Versión del contenido: 13.1  © 2431-6606 Healthwise, Incorporated. Las instrucciones de cuidado fueron adaptadas bajo licencia por [facility] ([facility]). Si usted tiene [place] afección médica o sobre estas instrucciones, siempre pregunte a santos profesional de allyssa. Healthwise, Incorporated niega toda garantía o responsabilidad por santos uso de esta información. Karma Reaper paroxístico danny (BPPV): Instrucciones de cuidado  Benign Paroxysmal Positional Vertigo (BPPV): Care Instructions  Instrucciones de cuidado    El vértigo posicional paroxístico danny, también conocido ace BPPV, por nadya siglas en [facility], es andry afección del oído interno que provoca andry sensación de que las cosas giran o dan vueltas al  la corazon. Esta sensación se conoce ace vértigo. Por lo general, el vértigo dura menos de un minuto. Las personas suelen tener un ataque de vértigo por unos pocos días o semanas. Luego el vértigo desaparece. Beth puede volver a aparecer.  El vértigo puede ser [place], o puede ser lo suficientemente yola ace para causar falta de equilibrio, náuseas y vómitos. Cuando usted se mueve, el oído interno envía mensajes al cerebro. [place] le ayuda a mantener el equilibrio. El vértigo puede ocurrir cuando se acumula suciedad en el oído interno. La acumulación puede hacer que el oído interno le envíe un mensaje equivocado al cerebro. Santos médico puede moverlo en diferentes posiciones para ayudar a que santos vértigo se mejore más rápido. [place] se llama maniobra de Epley. Santos médico también puede recetarle medicamentos o ejercicios para [employer]. La atención de seguimiento es andry parte clave de santos tratamiento y seguridad. Asegúrese de hacer y acudir a todas las citas, y llame a santos médico si está teniendo problemas. También es andry buena idea saber los resultados de amor exámenes y mantener andry lista de los medicamentos que roselia. ¿Cómo puede cuidarse en el hogar? · Si santos médico le sugiere que zackery los ejercicios de [place]:  ? Siéntese en el borde de andry cama o un sofá. Acuéstese rápido del lado que le causa el peor vértigo. Acuéstese con el oído hacia abajo. ? Quédese en vero posición ramesh por lo menos 30 segundos o hasta que el vértigo pase. ? Siéntese. Si esto le causa vértigo, espere a que pase. ? Repita shelly procedimiento del otro lado. ? Repita esto 10 veces. Zackery estos ejercicios 2 veces al día hasta que la sensación de vértigo desaparezca. ¿Cuándo debe pedir ayuda? Llame al 911 en cualquier momento que considere que necesita atención de emergencia. Por ejemplo, llame si:    · Tiene síntomas de un ataque cerebral. Estos pueden incluir:  ? Entumecimiento, hormigueo, debilitamiento o pérdida de movimiento repentinos en la sukh, el brazo o la pierna, sobre todo en un solo lado del cuerpo. ? Cambios repentinos en la visión. ? Problemas repentinos para hablar. ? Confusión o problemas para entender frases simples, los cuales aparecen repentinamente. ? Problemas repentinos para caminar o mantener el equilibrio. ?  Dolor de [place] intenso y repentino, distinto a los gio de corazon anteriores. Llame a santos médico ahora mismo o busque atención médica inmediata si:    · Tiene náuseas y vómito nuevos, o empeoran los que ya tenía.     · Tiene nuevos síntomas, ace pérdida de audición o rugido en amor oídos. Preste especial atención a los cambios en santos allyssa y asegúrese de comunicarse con santos médico si:    · No mejora ace se esperaba.     · El vértigo Natty Fogo. ¿Dónde puede encontrar más información en inglés? Leticia Atwood a [email or web address] e ingrese a santos cuenta de [place]. Doe Dodd P372 en el cuadro \"Search Health Information\" para más información (en inglés) sobre \"Vértigo posicional paroxístico danny (BPPV): Instrucciones de cuidado. \"     Si no tiene andry cuenta, dotty clic en el enlace \"Sign Up Now\". Revisado: 8 septiembre, 2021               Versión del contenido: 13.1  © 0555-1491 Healthwise, Incorporated. Las instrucciones de cuidado fueron adaptadas bajo licencia por [facility] ([facility]). Si usted tiene [place] afección médica o sobre estas instrucciones, siempre pregunte a santos profesional de allyssa. Healthwise, Incorporated niega toda garantía o responsabilidad por santos uso de esta información.

## 2022-02-07 NOTE — LETTER
[address] [address]  Phone: [phone]  Fax: [phone]    Foster Nunez MD        February 7, 2022     Patient: Boy Ashley   YOB: 1985   Date of Visit: 2/7/2022       To Whom it May Concern:    Boy Ashley was seen in my clinic on 2/7/2022. She should be off work until Wednesday, when she can return without restriction. If you have any questions or concerns, please don't hesitate to call.     Sincerely,         Foster Nunez MD

## 2022-02-07 NOTE — PROGRESS NOTES
[address] [address]  Dept: [phone]  Dept Fax: [phone]  Loc: [phone]      Dany Reynolds is a 43 y.o. female who presents todayfor her medical conditions/complaints as noted below. Dany Reynolds is c/o of Dizziness (x 2 weeks- constantly )      :     HPI     Dizziness started about 3 weeks ago. Sees cloudy because of chronic condition in right eye. Has lazy eye on right. Has had this since childhood. Thinks meclizine helped. Had an old script. Helped somewhat. Stopped blood pressure medication amlodipine in case these were causing dizziness. No change noted. Comes and goes. Worse with looking down or to the side. Sometime hand feels warm. Also has pain at posterior neck. Some relief with massage. Has lost weight. On a diet. Doing exercises also. 30 minutes a day. Taking Relora for 2 weeks. Dizziness started before this supplement. Only took Ashwagandha and Magnesium supplement once. Sometimes getting gas and burping also. Did stop omeprazole. Does have anxiety with panic and tingling fingers also. Interested in therapy. Regular menses. Patient Active Problem List   Diagnosis    Impaired fasting glucose    Morbid (severe) obesity due to excess calories (HCC)    Essential hypertension     Goals      Exercise 3x per week (30 min per time)         The patient has No Known Allergies. Medical History  Geovanna Roberts has a past medical history of Gallstone and Hypertension. Past SurgicalHistory  The patient  has a past surgical history that includes  section (). Family History  This patient's family history includes Breast Cancer (age of onset: 50) in her mother; Diabetes in her mother; Kidney Disease in her father. Social History  Geovanna Roberts  reports that she has never smoked.  She has never used smokeless tobacco. She reports that she does not drink alcohol and does not use drugs. Medications    Current Outpatient Medications:     meclizine (ANTIVERT) 25 MG tablet, Take 1 tablet by mouth 3 times daily as needed for Dizziness or Nausea, Disp: 30 tablet, Rfl: 0    cyclobenzaprine (FLEXERIL) 5 MG tablet, Take 1 tablet by mouth 2 times daily as needed for Muscle spasms, Disp: 20 tablet, Rfl: 0    omeprazole (PRILOSEC) 20 MG delayed release capsule, Take 1 capsule by mouth every morning (before breakfast), Disp: 90 capsule, Rfl: 1    dapagliflozin (FARXIGA) 5 MG tablet, Take 1 tablet by mouth every morning, Disp: 30 tablet, Rfl: 1    Prenatal Vit-Fe Fumarate-FA (PRENATAL VITAMIN) 27-0.8 MG TABS, Take by mouth, Disp: , Rfl:     amLODIPine (NORVASC) 10 MG tablet, Take 1 tablet by mouth daily, Disp: 90 tablet, Rfl: 3    metFORMIN (GLUCOPHAGE-XR) 500 MG extended release tablet, Take 1 tablet by mouth daily (with breakfast), Disp: 90 tablet, Rfl: 3    Subjective:      Review of Systems   Constitutional: Negative for chills, fatigue, fever and unexpected weight change. HENT: Negative for congestion, ear discharge, ear pain, hearing loss and sore throat. Eyes: Negative for discharge and redness. Respiratory: Negative for cough and shortness of breath. Cardiovascular: Negative for chest pain and palpitations. Gastrointestinal: Negative for abdominal pain, constipation, diarrhea, nausea and vomiting. Genitourinary: Negative for difficulty urinating and dysuria. Drinking a lot so urinating a lot. Musculoskeletal: Positive for neck pain. Negative for arthralgias, back pain and gait problem. Skin: Negative for rash. Allergic/Immunologic: Negative for environmental allergies. Neurological: Positive for dizziness and headaches (headache; 5-6/10). Negative for seizures, speech difficulty, weakness and numbness. Psychiatric/Behavioral: Negative for dysphoric mood and sleep disturbance.  The patient is nervous/anxious. Objective:     Vitals:    02/07/22 1130 02/07/22 1138   BP: (!) 170/80 (!) 168/78   Site: Right Upper Arm Left Upper Arm   Position: Sitting Sitting   Pulse: 99    Resp: 16    Temp: 97.7 °F (36.5 °C)    TempSrc: Temporal    SpO2: 97%    Weight: 296 lb (134.3 kg)        Physical Exam  Vitals reviewed. Constitutional:       General: She is not in acute distress. Appearance: She is well-developed. She is not toxic-appearing. HENT:      Head: Normocephalic and atraumatic. Right Ear: Tympanic membrane, ear canal and external ear normal.      Left Ear: Tympanic membrane, ear canal and external ear normal.      Nose: Nose normal.      Mouth/Throat:      Mouth: Mucous membranes are moist.      Pharynx: Oropharynx is clear. Eyes:      General:         Right eye: No discharge. Left eye: No discharge. Conjunctiva/sclera: Conjunctivae normal.      Pupils: Pupils are equal, round, and reactive to light. Comments: Right eye lazy; inwardly rotated. Neck:      Comments: Palpable tender muscle spasm worse on right. Cardiovascular:      Rate and Rhythm: Normal rate and regular rhythm. Heart sounds: Normal heart sounds. Pulmonary:      Effort: Pulmonary effort is normal. No respiratory distress. Breath sounds: Normal breath sounds. Abdominal:      Palpations: Abdomen is soft. Tenderness: There is no abdominal tenderness. Musculoskeletal:      Cervical back: Neck supple. Skin:     General: Skin is warm and dry. Comments: No obvious rash. Neurological:      Mental Status: She is alert. Cranial Nerves: Cranial nerves are intact. No dysarthria or facial asymmetry. Sensory: Sensation is intact. Motor: Motor function is intact. Coordination: Coordination is intact. Coordination normal. Finger-Nose-Finger Test normal.      Gait: Gait is intact.       Comments: No obvious focal deficit and normal exam.     Psychiatric: Attention and Perception: Attention and perception normal.         Mood and Affect: Mood is anxious. Mood is not depressed or elated. Affect is not labile, blunt, flat, angry, tearful or inappropriate. Speech: Speech normal.         Behavior: Behavior normal.         Thought Content: Thought content normal.         Judgment: Judgment normal.         Lab Results   Component Value Date    WBC 9.7 12/07/2021    HGB 14.0 12/07/2021    HCT 44.2 12/07/2021     12/07/2021    CHOL 178 12/07/2021    TRIG 196 12/07/2021    HDL 39 12/07/2021    ALT 28 12/07/2021    AST 24 12/07/2021     12/07/2021    K 4.0 12/07/2021     12/07/2021    CREATININE 0.8 12/07/2021    BUN 12 12/07/2021    CO2 27 12/07/2021    TSH 2.300 12/07/2021    LABA1C 6.3 02/07/2022       /Plan:   1. Type 2 diabetes mellitus with other specified complication, without long-term current use of insulin ([facility])  A1C 6.3. Will add Farxiga to help with weight loss and blood sugar control.    - POCT glycosylated hemoglobin (Hb A1C)  - dapagliflozin (FARXIGA) 5 MG tablet; Take 1 tablet by mouth every morning  Dispense: 30 tablet; Refill: 1    2. Essential hypertension  High today off medication. Resume amlodipine. 3. Abnormal laboratory test  Will repeat testing. Hopefully just elevated in the setting of acute infection. Currently asymptomatic.    - Fecal Lactoferrin; Future  - Calprotectin Stool; Future    4. Anxiety  Will refer to psychology. Consider medications if not improved. - [facility]    5. Dizziness  Suspect related to #8. Will trial meclizine, exercises. Indications for prompt return and/or emergent presentation if worsening reviewed in detail.    - meclizine (ANTIVERT) 25 MG tablet; Take 1 tablet by mouth 3 times daily as needed for Dizziness or Nausea  Dispense: 30 tablet; Refill: 0    6. Neck pain  Will trial flexeril. Continue massage.  Indications for prompt return and/or emergent presentation if worsening reviewed in detail. - cyclobenzaprine (FLEXERIL) 5 MG tablet; Take 1 tablet by mouth 2 times daily as needed for Muscle spasms  Dispense: 20 tablet; Refill: 0    7. Heartburn  Will resume omeprazole. Follow. - omeprazole (PRILOSEC) 20 MG delayed release capsule; Take 1 capsule by mouth every morning (before breakfast)  Dispense: 90 capsule; Refill: 1    8. Benign paroxysmal positional vertigo, unspecified laterality  Trial exercises. Meclizine. Close follow-up. Indications for prompt return and/or emergent presentation if worsening reviewed in detail. 9. Morbid (severe) obesity due to excess calories ([address]  Continue to work on the dietary measures and exercise. Limit carbohydrate intake to 45-60 gm per meal maximum (less carbohydrate okay), exercise 30 minutes at least 5 days per week doing something that you enjoy. It is okay to trial intermittent fasting (skipping solid food for 8-24 hours a couple times a week). When fasting stay hydrated by drinking plenty of water, unsweetened tea, black coffee, or bone broth. Return in about 2 weeks (around 2/21/2022) for re-check dizziness.   .    Orders Placed   Orders Placed This Encounter   Procedures    Fecal Lactoferrin     Standing Status:   Future     Standing Expiration Date:   2/7/2023    Calprotectin Stool     Standing Status:   Future     Standing Expiration Date:   2/7/2023   [facility]     Referral Priority:   Routine     Referral Type:   Eval and Treat     Referral Reason:   Specialty Services Required     Requested Specialty:   Psychology     Number of Visits Requested:   1    POCT glycosylated hemoglobin (Hb A1C)       Prescriptions given/sent  Orders Placed This Encounter   Medications    meclizine (ANTIVERT) 25 MG tablet     Sig: Take 1 tablet by mouth 3 times daily as needed for Dizziness or Nausea     Dispense:  30 tablet     Refill:  0    cyclobenzaprine (FLEXERIL) 5 MG tablet     Sig: Take 1 tablet by mouth 2 times daily as needed for Muscle spasms     Dispense:  20 tablet     Refill:  0    omeprazole (PRILOSEC) 20 MG delayed release capsule     Sig: Take 1 capsule by mouth every morning (before breakfast)     Dispense:  90 capsule     Refill:  1    dapagliflozin (FARXIGA) 5 MG tablet     Sig: Take 1 tablet by mouth every morning     Dispense:  30 tablet     Refill:  1       Patient given educational materials - see patient instructions. Discussed use, benefit, and side effects of recommended medications. All patient questions answered. Pt voiced understanding.           Electronically signed by Santos Quispe MD on 2/7/2022 at 1:01 PM

## 2022-04-06 ENCOUNTER — TELEPHONE (OUTPATIENT)
Dept: FAMILY MEDICINE CLINIC | Age: 37
End: 2022-04-06

## 2022-04-06 NOTE — TELEPHONE ENCOUNTER
Per Dr. Lorri Walker, called in Meclizine 25mg take 1 tab by mouth TID PRN dispense 30 with 0 refills to [facility] in [place].      Patient scheduled for 4/11/22 at 4:30pm.

## 2022-04-11 ENCOUNTER — OFFICE VISIT (OUTPATIENT)
Dept: FAMILY MEDICINE CLINIC | Age: 37
End: 2022-04-11
Payer: COMMERCIAL

## 2022-04-11 VITALS
BODY MASS INDEX: 53.92 KG/M2 | DIASTOLIC BLOOD PRESSURE: 92 MMHG | WEIGHT: 293 LBS | HEIGHT: 62 IN | SYSTOLIC BLOOD PRESSURE: 156 MMHG | HEART RATE: 88 BPM | OXYGEN SATURATION: 98 % | RESPIRATION RATE: 18 BRPM | TEMPERATURE: 97.7 F

## 2022-04-11 DIAGNOSIS — H53.009 AMBLYOPIA, UNSPECIFIED LATERALITY: ICD-10-CM

## 2022-04-11 DIAGNOSIS — R42 DIZZINESS: ICD-10-CM

## 2022-04-11 DIAGNOSIS — N92.6 IRREGULAR MENSES: ICD-10-CM

## 2022-04-11 DIAGNOSIS — E66.01 MORBID (SEVERE) OBESITY DUE TO EXCESS CALORIES (HCC): ICD-10-CM

## 2022-04-11 DIAGNOSIS — E11.69 TYPE 2 DIABETES MELLITUS WITH OTHER SPECIFIED COMPLICATION, WITHOUT LONG-TERM CURRENT USE OF INSULIN (HCC): Primary | ICD-10-CM

## 2022-04-11 DIAGNOSIS — I10 ESSENTIAL HYPERTENSION: ICD-10-CM

## 2022-04-11 DIAGNOSIS — R12 HEARTBURN: ICD-10-CM

## 2022-04-11 DIAGNOSIS — M62.838 MUSCLE SPASM: ICD-10-CM

## 2022-04-11 DIAGNOSIS — F41.9 ANXIETY: ICD-10-CM

## 2022-04-11 PROCEDURE — 3044F HG A1C LEVEL LT 7.0%: CPT | Performed by: FAMILY MEDICINE

## 2022-04-11 PROCEDURE — 99214 OFFICE O/P EST MOD 30 MIN: CPT | Performed by: FAMILY MEDICINE

## 2022-04-11 RX ORDER — ORAL SEMAGLUTIDE 3 MG/1
3 TABLET ORAL DAILY
Qty: 30 TABLET | Refills: 1 | Status: SHIPPED | OUTPATIENT
Start: 2022-04-11 | End: 2022-05-09

## 2022-04-11 RX ORDER — ORPHENADRINE CITRATE 100 MG/1
100 TABLET, EXTENDED RELEASE ORAL 2 TIMES DAILY PRN
Qty: 20 TABLET | Refills: 0 | Status: SHIPPED | OUTPATIENT
Start: 2022-04-11 | End: 2022-04-21

## 2022-04-11 RX ORDER — OMEPRAZOLE 20 MG/1
20 CAPSULE, DELAYED RELEASE ORAL
Qty: 90 CAPSULE | Refills: 1 | Status: SHIPPED | OUTPATIENT
Start: 2022-04-11 | End: 2022-08-01 | Stop reason: SDUPTHER

## 2022-04-11 RX ORDER — PROPRANOLOL HYDROCHLORIDE 40 MG/1
40 TABLET ORAL 2 TIMES DAILY
Qty: 60 TABLET | Refills: 1 | Status: SHIPPED | OUTPATIENT
Start: 2022-04-11 | End: 2022-06-13 | Stop reason: SDUPTHER

## 2022-04-11 RX ORDER — MECLIZINE HYDROCHLORIDE 25 MG/1
25 TABLET ORAL 3 TIMES DAILY PRN
Qty: 60 TABLET | Refills: 1 | Status: SHIPPED | OUTPATIENT
Start: 2022-04-11

## 2022-04-11 RX ORDER — MECLIZINE HYDROCHLORIDE 25 MG/1
TABLET ORAL
COMMUNITY
Start: 2022-04-06 | End: 2022-04-11 | Stop reason: SDUPTHER

## 2022-04-11 ASSESSMENT — ENCOUNTER SYMPTOMS
DIARRHEA: 0
SHORTNESS OF BREATH: 0
VOMITING: 0
EYE DISCHARGE: 0
EYE REDNESS: 0
SORE THROAT: 0
CONSTIPATION: 0
ABDOMINAL PAIN: 0
NAUSEA: 0
BACK PAIN: 0
COUGH: 0

## 2022-04-11 ASSESSMENT — PATIENT HEALTH QUESTIONNAIRE - PHQ9
SUM OF ALL RESPONSES TO PHQ9 QUESTIONS 1 & 2: 0
SUM OF ALL RESPONSES TO PHQ QUESTIONS 1-9: 0
1. LITTLE INTEREST OR PLEASURE IN DOING THINGS: 0
SUM OF ALL RESPONSES TO PHQ QUESTIONS 1-9: 0
2. FEELING DOWN, DEPRESSED OR HOPELESS: 0

## 2022-04-11 NOTE — PROGRESS NOTES
[address] [address]  Dept: [phone]  Dept Fax: [phone]  Loc: [phone]      Boris Garcia is a 43 y.o. female who presents todayfor her medical conditions/complaints as noted below. Boris Garcia is c/o of Follow-up (Dizziness- still having ) and Anxiety      :     HPI     Still dizzy. Meclizine helping. Exercises helped a lot but it came back. No dizziness on right. Does have on the left. Positional and worse with movement. 5 repetitions help. Also using meclizine. Often using twice a day. Did have a bit of nausea the last week also. Did go away completely but recurred. Also getting crampy pain in abdomen. Cycles are irregular. Menses was really short. Only 2 days. Usually has abundant flow. Took pregnancy test that was negative. When she lost weight menses were regular. With weight gain menses are more irregular again. Also still with anxiety. Friend had lost weight with levocarnitine. Was also on diethylpropion. She has lost weight on this. Stomach felt weird on Farxiga so stopped this. Patient Active Problem List   Diagnosis    Impaired fasting glucose    Morbid (severe) obesity due to excess calories (HCC)    Essential hypertension     Goals      Exercise 3x per week (30 min per time)         The patient has No Known Allergies. Medical History  Cami Shahid has a past medical history of Gallstone and Hypertension. Past SurgicalHistory  The patient  has a past surgical history that includes  section (). Family History  This patient's family history includes Breast Cancer (age of onset: 50) in her mother; Diabetes in her mother; Kidney Disease in her father. Social History  Cami Shahid  reports that she has never smoked.  She has never used smokeless tobacco. She reports that she does not drink alcohol and does not use Negative for dysphoric mood and sleep disturbance. The patient is nervous/anxious. Objective:     Vitals:    04/11/22 1708 04/11/22 1711   BP: (!) 162/90 (!) 156/92   Site: Left Upper Arm Right Upper Arm   Position: Sitting Sitting   Pulse: 88    Resp: 18    Temp: 97.7 °F (36.5 °C)    TempSrc: Temporal    SpO2: 98%    Weight: (!) 303 lb (137.4 kg)    Height: 5' 2\" (1.575 m)        Physical Exam  Vitals reviewed. Constitutional:       General: She is not in acute distress. Appearance: She is well-developed. HENT:      Head: Normocephalic and atraumatic. Eyes:      Conjunctiva/sclera: Conjunctivae normal.   Neck:      Comments: Full range of motion of neck. Non-tender. Does have palpable muscle spasm bilateral upper back. Cardiovascular:      Rate and Rhythm: Normal rate and regular rhythm. Heart sounds: Normal heart sounds. Pulmonary:      Effort: Pulmonary effort is normal. No respiratory distress. Breath sounds: Normal breath sounds. Abdominal:      Palpations: Abdomen is soft. Tenderness: There is no abdominal tenderness. Musculoskeletal:      Cervical back: Normal range of motion and neck supple. Skin:     General: Skin is warm and dry. Comments: No obvious rash. Neurological:      Mental Status: She is alert. Sensory: No sensory deficit. Motor: No weakness. Coordination: Coordination normal.      Gait: Gait normal.      Comments: No obvious focal deficit and normal exam except right eye inturning chronically. Psychiatric:         Attention and Perception: Attention and perception normal.         Mood and Affect: Mood is anxious. Mood is not depressed or elated. Affect is not labile, blunt, flat, angry, tearful or inappropriate. Speech: Speech normal.         Behavior: Behavior normal.         Thought Content:  Thought content normal.         Judgment: Judgment normal.         Lab Results   Component Value Date    WBC 9.7 12/07/2021 HGB 14.0 12/07/2021    HCT 44.2 12/07/2021     12/07/2021    CHOL 178 12/07/2021    TRIG 196 12/07/2021    HDL 39 12/07/2021    ALT 28 12/07/2021    AST 24 12/07/2021     12/07/2021    K 4.0 12/07/2021     12/07/2021    CREATININE 0.8 12/07/2021    BUN 12 12/07/2021    CO2 27 12/07/2021    TSH 2.300 12/07/2021    LABA1C 6.3 02/07/2022       /Plan:   1. Type 2 diabetes mellitus with other specified complication, without long-term current use of insulin (HCC)  At goal.  Will add rybelsus for weight loss. Refer to ophthalmology. - Semaglutide (RYBELSUS) 3 MG TABS; Take 3 mg by mouth daily  Dispense: 30 tablet; Refill: 1  - AFL - Monse Lobo MD, Opthalmology, [facility]    2. Muscle spasm  Upper back. Will add norflex prn.    - orphenadrine (NORFLEX) 100 MG extended release tablet; Take 1 tablet by mouth 2 times daily as needed for Muscle spasms  Dispense: 20 tablet; Refill: 0    3. Essential hypertension  Elevated today. Will add propranolol for blood pressure and anxiety. - propranolol (INDERAL) 40 MG tablet; Take 1 tablet by mouth 2 times daily  Dispense: 60 tablet; Refill: 1    4. Dizziness  Ongoing. Continue exercises. Only one side. I still suspect benign positional vertigo. Will check MRI brain. If ongoing consider ENT referral.    - meclizine (ANTIVERT) 25 MG tablet; Take 1 tablet by mouth 3 times daily as needed for Dizziness  Dispense: 60 tablet; Refill: 1  - MRI BRAIN W WO CONTRAST; Future    5. Irregular menses  Will check ultrasound given irregular bleeding.    - US NON OB TRANSVAGINAL; Future    6. Amblyopia, unspecified laterality  Referring to ophthalmology. - Avelina Baumgarten, MD, Opthalmology, [facility]    7. Heartburn  Continue omeprazole prn. Stable. - omeprazole (PRILOSEC) 20 MG delayed release capsule; Take 1 capsule by mouth every morning (before breakfast)  Dispense: 90 capsule; Refill: 1    8.  Morbid (severe) obesity due to excess calories ([address]  Continue to work on the dietary measures and exercise. Limit carbohydrate intake to 45-60 gm per meal maximum (less carbohydrate okay), exercise 30 minutes at least 5 days per week doing something that you enjoy. It is okay to trial intermittent fasting (skipping solid food for 8-24 hours a couple times a week). When fasting stay hydrated by drinking plenty of water, unsweetened tea, black coffee, or bone broth. Adding Rybelsus. - Semaglutide (RYBELSUS) 3 MG TABS; Take 3 mg by mouth daily  Dispense: 30 tablet; Refill: 1    9. Anxiety  Will add beta blocker. Follow. Return in about 1 month (around 5/11/2022) for Follow-up chronic conditions. Orders Placed   Orders Placed This Encounter   Procedures    US NON OB TRANSVAGINAL     Standing Status:   Future     Standing Expiration Date:   4/11/2023     Scheduling Instructions:      Let me know time and I will call patient. Syrian speaking.  [phone] is my cell.      Order Specific Question:   Reason for exam:     Answer:   irregular menses    MRI BRAIN W WO CONTRAST     Standing Status:   Future     Standing Expiration Date:   4/11/2023     Order Specific Question:   STAT Creatinine as needed:     Answer:   Yes     Order Specific Question:   Reason for exam:     Answer:   headache and dizziness    RAMESH - Corby Stallworth MD, Opthalmology, [facility]     Referral Priority:   Routine     Referral Type:   Eval and Treat     Referral Reason:   Specialty Services Required     Referred to Provider:   Mindi Steven MD     Requested Specialty:   Ophthalmology     Number of Visits Requested:   1       Prescriptions given/sent  Orders Placed This Encounter   Medications    Semaglutide (RYBELSUS) 3 MG TABS     Sig: Take 3 mg by mouth daily     Dispense:  30 tablet     Refill:  1    orphenadrine (NORFLEX) 100 MG extended release tablet     Sig: Take 1 tablet by mouth 2 times daily as needed for Muscle spasms     Dispense:  20 tablet     Refill:  0    meclizine (ANTIVERT) 25 MG tablet

## 2022-04-18 ENCOUNTER — TELEPHONE (OUTPATIENT)
Dept: FAMILY MEDICINE CLINIC | Age: 37
End: 2022-04-18

## 2022-04-18 ENCOUNTER — OFFICE VISIT (OUTPATIENT)
Dept: FAMILY MEDICINE CLINIC | Age: 37
End: 2022-04-18
Payer: COMMERCIAL

## 2022-04-18 VITALS
TEMPERATURE: 97.2 F | BODY MASS INDEX: 55.82 KG/M2 | WEIGHT: 293 LBS | RESPIRATION RATE: 16 BRPM | OXYGEN SATURATION: 98 % | HEART RATE: 80 BPM | DIASTOLIC BLOOD PRESSURE: 84 MMHG | SYSTOLIC BLOOD PRESSURE: 122 MMHG

## 2022-04-18 DIAGNOSIS — I10 ESSENTIAL HYPERTENSION: ICD-10-CM

## 2022-04-18 DIAGNOSIS — R42 DIZZINESS: ICD-10-CM

## 2022-04-18 DIAGNOSIS — N89.8 VAGINAL DISCHARGE: Primary | ICD-10-CM

## 2022-04-18 DIAGNOSIS — N92.6 IRREGULAR MENSES: ICD-10-CM

## 2022-04-18 LAB
BILIRUBIN URINE: NEGATIVE
BLOOD URINE, POC: NEGATIVE
CHARACTER, URINE: ABNORMAL
COLOR, URINE: YELLOW
CONTROL: NORMAL
GLUCOSE URINE: NEGATIVE MG/DL
KETONES, URINE: NEGATIVE
LEUKOCYTE CLUMPS, URINE: NEGATIVE
NITRITE, URINE: NEGATIVE
PH, URINE: 5.5 (ref 5–9)
PREGNANCY TEST URINE, POC: NEGATIVE
PROTEIN, URINE: NEGATIVE MG/DL
SPECIFIC GRAVITY, URINE: 1.02 (ref 1–1.03)
UROBILINOGEN, URINE: 0.2 EU/DL (ref 0–1)

## 2022-04-18 PROCEDURE — 81025 URINE PREGNANCY TEST: CPT | Performed by: FAMILY MEDICINE

## 2022-04-18 PROCEDURE — 99214 OFFICE O/P EST MOD 30 MIN: CPT | Performed by: FAMILY MEDICINE

## 2022-04-18 ASSESSMENT — ENCOUNTER SYMPTOMS
SORE THROAT: 0
VOMITING: 0
ABDOMINAL PAIN: 1
NAUSEA: 0
CONSTIPATION: 0
DIARRHEA: 0
BACK PAIN: 0

## 2022-04-18 NOTE — TELEPHONE ENCOUNTER
What is status of testing? Wants to schedule ultrasound transvaginal.  Prefers at [facility]. [facility]. Friday is better if possible. Prefers to wait for MRI since feeling better with chiropractic adjustment of neck.

## 2022-04-18 NOTE — PROGRESS NOTES
[address] [address]  Dept: [phone]  Dept Fax: [phone]  Loc: [phone]      Saba Guzmán is a 43 y.o. female who presents todayfor Dysuria (x1 week)      :   Vaginal Itching  The patient's primary symptoms include genital itching and vaginal discharge. The patient's pertinent negatives include no genital lesions, genital odor, genital rash, missed menses, pelvic pain or vaginal bleeding. Primary symptoms comment: burning sensation in vagina. This is a new problem. The current episode started in the past 7 days. The problem has been unchanged. The patient is experiencing no pain. She is not pregnant. Associated symptoms include abdominal pain (intermittent left side for moments; not now) and dysuria (once). Pertinent negatives include no anorexia, back pain, chills, constipation, diarrhea, discolored urine, fever, flank pain, frequency, headaches, hematuria, joint pain, joint swelling, nausea, painful intercourse, rash, sore throat, urgency or vomiting. The vaginal discharge was clear. The vaginal bleeding is lighter than menses. She has not been passing clots. She has not been passing tissue. She has tried nothing for the symptoms. Last menses was     Did have irregular spotting in April. Only 2 days but light. Did do a pregnancy test.  This was negative. No clear dysuria. Vaginal burning and discharge. patient has No Known Allergies. Past MedicalHistory  Rupinder Ponce  has a past medical history of Gallstone and Hypertension. Past Surgical History  The patient  has a past surgical history that includes  section (). Family History  This patient's family history includes Breast Cancer (age of onset: 50) in her mother; Diabetes in her mother; Kidney Disease in her father. Social History  Rupinder Ponce  reports that she has never smoked.  She has never used smokeless tobacco. She reports that she does not drink alcohol and does not use drugs. Medications    Current Outpatient Medications:     NONFORMULARY, Stress Calm - for anxiety, Disp: , Rfl:     Semaglutide (RYBELSUS) 3 MG TABS, Take 3 mg by mouth daily, Disp: 30 tablet, Rfl: 1    orphenadrine (NORFLEX) 100 MG extended release tablet, Take 1 tablet by mouth 2 times daily as needed for Muscle spasms, Disp: 20 tablet, Rfl: 0    meclizine (ANTIVERT) 25 MG tablet, Take 1 tablet by mouth 3 times daily as needed for Dizziness, Disp: 60 tablet, Rfl: 1    propranolol (INDERAL) 40 MG tablet, Take 1 tablet by mouth 2 times daily, Disp: 60 tablet, Rfl: 1    omeprazole (PRILOSEC) 20 MG delayed release capsule, Take 1 capsule by mouth every morning (before breakfast), Disp: 90 capsule, Rfl: 1    amLODIPine (NORVASC) 10 MG tablet, Take 1 tablet by mouth daily, Disp: 90 tablet, Rfl: 3    metFORMIN (GLUCOPHAGE-XR) 500 MG extended release tablet, Take 1 tablet by mouth daily (with breakfast), Disp: 90 tablet, Rfl: 3    :     Review of Systems   Constitutional: Negative for chills and fever. HENT: Negative for sore throat. Gastrointestinal: Positive for abdominal pain (intermittent left side for moments; not now). Negative for anorexia, constipation, diarrhea, nausea and vomiting. Genitourinary: Positive for dysuria (once) and vaginal discharge. Negative for flank pain, frequency, hematuria, missed menses, pelvic pain and urgency. Musculoskeletal: Negative for back pain and joint pain. Skin: Negative for rash. Neurological: Negative for headaches.       :     Vitals:    04/18/22 1757   BP: 122/84   Site: Left Upper Arm   Pulse: 80   Resp: 16   Temp: 97.2 °F (36.2 °C)   TempSrc: Skin   SpO2: 98%   Weight: (!) 305 lb 3.2 oz (138.4 kg)       Physical Exam  Vitals reviewed. Exam conducted with a chaperone present. Constitutional:       General: She is not in acute distress. Appearance: She is well-developed.  She is obese. She is not ill-appearing or toxic-appearing. HENT:      Head: Normocephalic and atraumatic. Eyes:      Conjunctiva/sclera: Conjunctivae normal.   Cardiovascular:      Rate and Rhythm: Normal rate and regular rhythm. Heart sounds: Normal heart sounds. Pulmonary:      Effort: Pulmonary effort is normal. No respiratory distress. Breath sounds: Normal breath sounds. Abdominal:      Palpations: Abdomen is soft. Tenderness: There is no abdominal tenderness. Hernia: There is no hernia in the left inguinal area or right inguinal area. Genitourinary:     General: Normal vulva. Labia:         Right: No rash, tenderness, lesion or injury. Left: No rash, tenderness, lesion or injury. Vagina: No signs of injury and foreign body. Vaginal discharge present. No erythema, tenderness, bleeding, lesions or prolapsed vaginal walls. Cervix: Normal.      Uterus: Normal.       Adnexa: Right adnexa normal and left adnexa normal.      Comments: Exam limited by body habitus. Does have a clear vaginal discharge. Otherwise normal exam.    Musculoskeletal:      Cervical back: Neck supple. Lymphadenopathy:      Lower Body: No right inguinal adenopathy. No left inguinal adenopathy. Skin:     General: Skin is warm and dry. Comments: No obvious rash. Neurological:      Mental Status: She is alert. Comments: No obvious focal deficit. Psychiatric:         Behavior: Behavior normal.         Assessment/Plan:   1. Vaginal discharge  New issue today. No severe symptoms or definite pathological discharge. Low risk and in a monogamous relationship. Discussed option of empiric treatment for bacterial vaginosis versus waiting until results are back and patient prefers to wait for results. Indications for prompt return and/or emergent presentation if worsening reviewed in detail. - Vaginal Pathogens DNA Panel; Future  - C. Trachomatis / N.  Gonorrhoeae, DNA Probe  - Vaginal Pathogens DNA Panel    2. Irregular menses  Negative pregnancy test. Ultrasound ordered and pending. Indications for prompt return and/or emergent presentation if worsening reviewed in detail.    - POCT urine pregnancy    3. Dizziness  Chronic. Did have transient symptoms post pelvic exam that passed. Improved overall with chiropractic treatment for neck. Discussed options. Prefers to wait on MRI which is not unreasonable since improving. Indications for prompt return and/or emergent presentation if worsening reviewed in detail. 4. Essential hypertension  At goal today. Return if symptoms worsen or fail to improve, for and as scheduled. Orders Placed  Orders Placed This Encounter   Procedures    Vaginal Pathogens DNA Panel     Standing Status:   Future     Number of Occurrences:   1     Standing Expiration Date:   4/18/2023    C. Trachomatis / N. Gonorrhoeae, DNA Probe    POCT Urinalysis No Micro (Auto)     Ordered by an unspecified provider      POCT urine pregnancy       Prescriptions given/sent   No orders of the defined types were placed in this encounter. Patient instructions given and reviewed. Discussed use, benefit, and side effects of recommended medications. All patient questions answered. Pt voiced understanding. Follow-up planned for other issues in several weeks.           Electronically signed by Edgar Dumont MD on 4/18/2022at 8:11 PM

## 2022-04-19 LAB
CANDIDA SPECIES, DNA PROBE: NEGATIVE
GARDNERELLA VAGINALIS, DNA PROBE: NEGATIVE
SOURCE: NORMAL
TRICHOMONAS VAGINALIS DNA: NEGATIVE

## 2022-04-19 NOTE — TELEPHONE ENCOUNTER
Patient is scheduled for 4/29/22 at [facility]- arrive at 12:45 pm-     * Please report to Main Radiology 1st floor      [address]  [address] [place], [place] [address]way, [address]     Please call patient to notify since [address] speaking     MRI cancelled at this time

## 2022-04-21 LAB
CHLAMYDIA TRACHOMATIS AMPLIFIED DET: NEGATIVE
NEISSERIA GONORRHOEAE BY AMP: NEGATIVE
SPECIMEN SOURCE: NORMAL

## 2022-05-09 DIAGNOSIS — E11.69 TYPE 2 DIABETES MELLITUS WITH OTHER SPECIFIED COMPLICATION, WITHOUT LONG-TERM CURRENT USE OF INSULIN (HCC): ICD-10-CM

## 2022-05-09 DIAGNOSIS — E66.01 MORBID (SEVERE) OBESITY DUE TO EXCESS CALORIES (HCC): ICD-10-CM

## 2022-05-09 RX ORDER — ORAL SEMAGLUTIDE 7 MG/1
7 TABLET ORAL DAILY
Qty: 30 TABLET | Refills: 0 | Status: SHIPPED | OUTPATIENT
Start: 2022-05-09 | End: 2022-06-13

## 2022-05-09 NOTE — PROGRESS NOTES
Needed refill on Rybelsus but to re-schedule appointment. Re-scheduled for June 6th. Rx sent in for higher dose since tolerating well.

## 2022-05-20 ENCOUNTER — TELEPHONE (OUTPATIENT)
Dept: FAMILY MEDICINE CLINIC | Age: 37
End: 2022-05-20

## 2022-05-20 DIAGNOSIS — R73.03 PREDIABETES: ICD-10-CM

## 2022-05-20 DIAGNOSIS — E66.01 MORBID OBESITY (HCC): ICD-10-CM

## 2022-05-20 RX ORDER — METFORMIN HYDROCHLORIDE 500 MG/1
500 TABLET, EXTENDED RELEASE ORAL
Qty: 90 TABLET | Refills: 1 | Status: SHIPPED | OUTPATIENT
Start: 2022-05-20

## 2022-06-13 DIAGNOSIS — E11.69 TYPE 2 DIABETES MELLITUS WITH OTHER SPECIFIED COMPLICATION, WITHOUT LONG-TERM CURRENT USE OF INSULIN (HCC): ICD-10-CM

## 2022-06-13 DIAGNOSIS — E66.01 MORBID (SEVERE) OBESITY DUE TO EXCESS CALORIES (HCC): ICD-10-CM

## 2022-06-13 DIAGNOSIS — I10 ESSENTIAL HYPERTENSION: ICD-10-CM

## 2022-06-13 RX ORDER — ORAL SEMAGLUTIDE 14 MG/1
14 TABLET ORAL DAILY
Qty: 90 TABLET | Refills: 0 | Status: SHIPPED | OUTPATIENT
Start: 2022-06-13 | End: 2022-08-01 | Stop reason: SDUPTHER

## 2022-06-13 RX ORDER — AMLODIPINE BESYLATE 10 MG/1
10 TABLET ORAL DAILY
Qty: 90 TABLET | Refills: 1 | Status: SHIPPED | OUTPATIENT
Start: 2022-06-13

## 2022-06-13 RX ORDER — PROPRANOLOL HYDROCHLORIDE 40 MG/1
40 TABLET ORAL 2 TIMES DAILY
Qty: 180 TABLET | Refills: 1 | Status: SHIPPED | OUTPATIENT
Start: 2022-06-13

## 2022-07-03 ENCOUNTER — TELEPHONE (OUTPATIENT)
Dept: FAMILY MEDICINE CLINIC | Age: 37
End: 2022-07-03

## 2022-07-05 NOTE — TELEPHONE ENCOUNTER
See phone note. Note that patient's family members recovering well from [place]. I Did chat with her. She remains negative.

## 2022-08-01 ENCOUNTER — OFFICE VISIT (OUTPATIENT)
Dept: FAMILY MEDICINE CLINIC | Age: 37
End: 2022-08-01
Payer: COMMERCIAL

## 2022-08-01 VITALS
BODY MASS INDEX: 53.92 KG/M2 | HEIGHT: 62 IN | OXYGEN SATURATION: 99 % | SYSTOLIC BLOOD PRESSURE: 132 MMHG | RESPIRATION RATE: 20 BRPM | HEART RATE: 69 BPM | DIASTOLIC BLOOD PRESSURE: 88 MMHG | WEIGHT: 293 LBS

## 2022-08-01 DIAGNOSIS — Z12.31 ENCOUNTER FOR SCREENING MAMMOGRAM FOR MALIGNANT NEOPLASM OF BREAST: ICD-10-CM

## 2022-08-01 DIAGNOSIS — E66.01 MORBID (SEVERE) OBESITY DUE TO EXCESS CALORIES (HCC): ICD-10-CM

## 2022-08-01 DIAGNOSIS — F41.9 ANXIETY: ICD-10-CM

## 2022-08-01 DIAGNOSIS — I10 ESSENTIAL HYPERTENSION: ICD-10-CM

## 2022-08-01 DIAGNOSIS — E11.69 TYPE 2 DIABETES MELLITUS WITH OTHER SPECIFIED COMPLICATION, WITHOUT LONG-TERM CURRENT USE OF INSULIN (HCC): Primary | ICD-10-CM

## 2022-08-01 DIAGNOSIS — R12 HEARTBURN: ICD-10-CM

## 2022-08-01 LAB — HBA1C MFR BLD: 6.3 %

## 2022-08-01 PROCEDURE — 83036 HEMOGLOBIN GLYCOSYLATED A1C: CPT | Performed by: FAMILY MEDICINE

## 2022-08-01 PROCEDURE — 3044F HG A1C LEVEL LT 7.0%: CPT | Performed by: FAMILY MEDICINE

## 2022-08-01 PROCEDURE — 99214 OFFICE O/P EST MOD 30 MIN: CPT | Performed by: FAMILY MEDICINE

## 2022-08-01 RX ORDER — ORAL SEMAGLUTIDE 7 MG/1
7 TABLET ORAL DAILY
Qty: 30 TABLET | Refills: 0 | Status: SHIPPED | OUTPATIENT
Start: 2022-08-01

## 2022-08-01 RX ORDER — ORAL SEMAGLUTIDE 14 MG/1
14 TABLET ORAL DAILY
Qty: 90 TABLET | Refills: 0 | Status: SHIPPED | OUTPATIENT
Start: 2022-08-01

## 2022-08-01 RX ORDER — OMEPRAZOLE 20 MG/1
20 CAPSULE, DELAYED RELEASE ORAL
Qty: 90 CAPSULE | Refills: 1 | Status: SHIPPED | OUTPATIENT
Start: 2022-08-01

## 2022-08-01 ASSESSMENT — ENCOUNTER SYMPTOMS
DIARRHEA: 0
NAUSEA: 0
VOMITING: 0
CONSTIPATION: 0
COUGH: 0
SHORTNESS OF BREATH: 0
BLOOD IN STOOL: 0

## 2022-08-01 NOTE — PROGRESS NOTES
[address] [address]  Dept: [phone]  Dept Fax: [phone]  Loc: [phone]      Jose Garner is a 43 y.o. female who presents todayfor her medical conditions/complaints as noted below. Jose Garner is c/o of Diabetes, Heartburn, and Anxiety      :     HPI    Needs refill on Rybelsus. Reports has not been taking for one month. Is still anxious. Especially when around other people. Hands get sweaty. Not impairing ability to do things. Also ate something abnormal yesterday and had heartburn. Omeprazole helped a lot. Also took Gas relief. Also took magnesium. Symptoms are better now. Is taking a lot of water. Also taking Stress-Calm homeopathic remedy. Feels like this helps. Is working to re-commit to diet. Wondering about Phenteramine. Patient Active Problem List   Diagnosis    Impaired fasting glucose    Morbid (severe) obesity due to excess calories (HCC)    Essential hypertension      Goals        Exercise 3x per week (30 min per time)           The patient has No Known Allergies. Medical History  Jerry Maya has a past medical history of Gallstone and Hypertension. Past SurgicalHistory  The patient  has a past surgical history that includes  section (). Family History  This patient's family history includes Breast Cancer (age of onset: 50) in her mother; Diabetes in her mother; Kidney Disease in her father. Social History  Jerry Maya  reports that she has never smoked. She has never used smokeless tobacco. She reports that she does not drink alcohol and does not use drugs.     Medications    Current Outpatient Medications:     Semaglutide (RYBELSUS) 7 MG TABS, Take 7 mg by mouth daily, Disp: 30 tablet, Rfl: 0    Semaglutide (RYBELSUS) 14 MG TABS, Take 14 mg by mouth daily, Disp: 90 tablet, Rfl: 0    omeprazole (PRILOSEC) 20 MG delayed release capsule, Take 1 capsule by mouth every morning (before breakfast), Disp: 90 capsule, Rfl: 1    propranolol (INDERAL) 40 mg tablet, Take 1 tablet by mouth 2 times daily, Disp: 180 tablet, Rfl: 1    amLODIPine (NORVASC) 10 MG tablet, Take 1 tablet by mouth daily, Disp: 90 tablet, Rfl: 1    metFORMIN (GLUCOPHAGE-XR) 500 MG extended release tablet, Take 1 tablet by mouth daily (with breakfast), Disp: 90 tablet, Rfl: 1    NONFORMULARY, Stress Calm - for anxiety, Disp: , Rfl:     meclizine (ANTIVERT) 25 MG tablet, Take 1 tablet by mouth 3 times daily as needed for Dizziness, Disp: 60 tablet, Rfl: 1    Subjective:      Review of Systems   Constitutional:  Negative for chills, fatigue and fever. Respiratory:  Negative for cough and shortness of breath. Cardiovascular:  Negative for chest pain and palpitations. Gastrointestinal:  Negative for blood in stool, constipation, diarrhea, nausea and vomiting. Skin:  Negative for rash. Psychiatric/Behavioral:  Negative for decreased concentration, dysphoric mood, self-injury, sleep disturbance and suicidal ideas. The patient is nervous/anxious. Objective:     Vitals:    08/01/22 1834   BP: 132/88   Site: Left Wrist   Position: Sitting   Pulse: 69   Resp: 20   SpO2: 99%   Weight: (!) 306 lb (138.8 kg)   Height: 5' 2\" (1.575 m)       Physical Exam  Vitals reviewed. Constitutional:       General: She is not in acute distress. Appearance: She is well-developed. She is obese. She is not ill-appearing or toxic-appearing. HENT:      Head: Normocephalic and atraumatic. Eyes:      General:         Right eye: No discharge. Left eye: No discharge. Conjunctiva/sclera: Conjunctivae normal.   Cardiovascular:      Rate and Rhythm: Normal rate and regular rhythm. Heart sounds: Normal heart sounds. Pulmonary:      Effort: Pulmonary effort is normal. No respiratory distress. Breath sounds: Normal breath sounds. No stridor.  No wheezing, rhonchi or rales. Abdominal:      Palpations: Abdomen is soft. Tenderness: There is no abdominal tenderness. Musculoskeletal:      Cervical back: Neck supple. Lymphadenopathy:      Cervical: No cervical adenopathy. Skin:     General: Skin is warm and dry. Comments: No obvious rash. Neurological:      Mental Status: She is alert. Comments: No obvious focal deficit. Psychiatric:         Mood and Affect: Mood normal.         Behavior: Behavior normal.         Thought Content: Thought content normal.         Judgment: Judgment normal.       Lab Results   Component Value Date    WBC 9.7 12/07/2021    HGB 14.0 12/07/2021    HCT 44.2 12/07/2021     12/07/2021    CHOL 178 12/07/2021    TRIG 196 12/07/2021    HDL 39 12/07/2021    ALT 28 12/07/2021    AST 24 12/07/2021     12/07/2021    K 4.0 12/07/2021     12/07/2021    CREATININE 0.8 12/07/2021    BUN 12 12/07/2021    CO2 27 12/07/2021    TSH 2.300 12/07/2021    LABA1C 6.3 08/01/2022       /Plan:   1. Type 2 diabetes mellitus with other specified complication, without long-term current use of insulin ([address]  Off Rybelsus for 1 month. Will re-start at 7 mg dose for 1 month and then increase to 14 mg dose. Continue to work on the dietary measures and exercise. Limit carbohydrate intake to 45-60 gm per meal maximum (less carbohydrate okay), exercise 30 minutes at least 5 days per week doing something that you enjoy. It is okay to trial intermittent fasting (skipping solid food for 24 hours a couple times a week). When fasting stay hydrated by drinking plenty of water, unsweetened tea, black coffee, or bone broth. Continue metformin also. Headed to ER with mother today but will need micro albumin and foot exam in follow-up. - POCT glycosylated hemoglobin (Hb A1C)  - Semaglutide (RYBELSUS) 7 MG TABS; Take 7 mg by mouth daily  Dispense: 30 tablet; Refill: 0      2. Heartburn  Continue omeprazole as needed.   Indications for prompt return and/or emergent presentation if worsening reviewed in detail.    - omeprazole (PRILOSEC) 20 MG delayed release capsule; Take 1 capsule by mouth every morning (before breakfast)  Dispense: 90 capsule; Refill: 1    3. Essential hypertension  At goal.  Continue propranolol and Norvasc. 4. Anxiety  Stop Stress-Calm. Any effect would be due to placebo. Will follow. Consider SSRI if worsening. 5. Morbid (severe) obesity due to excess calories (HCC)  Resume GLP-1. Would not recommend Phenteramine with hypertension. Could consider injectable GLP-1 weekly if preferred. 6. Encounter for screening mammogram for malignant neoplasm of breast  Screen ordered. - [facility] DIGITAL SCREEN SELF REFERRAL W OR WO CAD BILATERAL; Future          Return in about 3 months (around 11/1/2022) for Follow-up chronic conditions. Orders Placed   Orders Placed This Encounter   Procedures    AUREA DIGITAL SCREEN SELF REFERRAL W OR WO CAD BILATERAL     Standing Status:   Future     Standing Expiration Date:   10/1/2023     Order Specific Question:   Reason for exam:     Answer:   screen    POCT glycosylated hemoglobin (Hb A1C)       Prescriptions given/sent  Orders Placed This Encounter   Medications    Semaglutide (RYBELSUS) 7 MG TABS     Sig: Take 7 mg by mouth daily     Dispense:  30 tablet     Refill:  0    Semaglutide (RYBELSUS) 14 MG TABS     Sig: Take 14 mg by mouth daily     Dispense:  90 tablet     Refill:  0    omeprazole (PRILOSEC) 20 MG delayed release capsule     Sig: Take 1 capsule by mouth every morning (before breakfast)     Dispense:  90 capsule     Refill:  1       Patient given educational materials - see patient instructions. Discussed use, benefit, and side effects of recommended medications. All patient questions answered. Pt voiced understanding. Reviewed health maintenance. Chatted with patent and confirmed plan. Follow-up scheduled.       Electronically signed by Donald Wiggins MD on 8/1/2022 at 11:49 PM

## 2022-09-12 ENCOUNTER — TELEPHONE (OUTPATIENT)
Dept: FAMILY MEDICINE CLINIC | Age: 37
End: 2022-09-12

## 2022-09-12 NOTE — TELEPHONE ENCOUNTER
Canceled today. Would like to re-schedule for October. Would like appointment for mother Leonel Umana on the same day. Please schedule and send me times and I will message the patient. Thanks.

## 2022-09-13 NOTE — TELEPHONE ENCOUNTER
I have kalin scheduled for October 13 th @ 1:30 pm and her mom Alice Ocampo scheduled for October 13 th @ 1:00 pm

## 2022-11-17 ENCOUNTER — TELEPHONE (OUTPATIENT)
Dept: FAMILY MEDICINE CLINIC | Age: 37
End: 2022-11-17

## 2022-11-17 ENCOUNTER — OFFICE VISIT (OUTPATIENT)
Dept: FAMILY MEDICINE CLINIC | Age: 37
End: 2022-11-17
Payer: COMMERCIAL

## 2022-11-17 VITALS
OXYGEN SATURATION: 97 % | HEART RATE: 85 BPM | DIASTOLIC BLOOD PRESSURE: 82 MMHG | WEIGHT: 293 LBS | HEIGHT: 62 IN | RESPIRATION RATE: 16 BRPM | BODY MASS INDEX: 53.92 KG/M2 | SYSTOLIC BLOOD PRESSURE: 144 MMHG

## 2022-11-17 DIAGNOSIS — I10 ESSENTIAL HYPERTENSION: ICD-10-CM

## 2022-11-17 DIAGNOSIS — F41.9 ANXIETY: ICD-10-CM

## 2022-11-17 DIAGNOSIS — Z11.59 NEED FOR HEPATITIS C SCREENING TEST: ICD-10-CM

## 2022-11-17 DIAGNOSIS — Z31.69 ENCOUNTER FOR PRECONCEPTION CONSULTATION: ICD-10-CM

## 2022-11-17 DIAGNOSIS — E11.69 TYPE 2 DIABETES MELLITUS WITH OTHER SPECIFIED COMPLICATION, WITHOUT LONG-TERM CURRENT USE OF INSULIN (HCC): Primary | ICD-10-CM

## 2022-11-17 DIAGNOSIS — E66.01 MORBID OBESITY (HCC): ICD-10-CM

## 2022-11-17 LAB
ALBUMIN SERPL-MCNC: 4.1 G/DL (ref 3.5–5.1)
ALP BLD-CCNC: 72 U/L (ref 38–126)
ALT SERPL-CCNC: 20 U/L (ref 11–66)
ANION GAP SERPL CALCULATED.3IONS-SCNC: 11 MEQ/L (ref 8–16)
AST SERPL-CCNC: 16 U/L (ref 5–40)
BASOPHILS # BLD: 0.8 %
BASOPHILS ABSOLUTE: 0.1 THOU/MM3 (ref 0–0.1)
BILIRUB SERPL-MCNC: 0.2 MG/DL (ref 0.3–1.2)
BUN BLDV-MCNC: 16 MG/DL (ref 7–22)
CALCIUM SERPL-MCNC: 9.4 MG/DL (ref 8.5–10.5)
CHLORIDE BLD-SCNC: 102 MEQ/L (ref 98–111)
CHOLESTEROL, FASTING: 185 MG/DL (ref 100–199)
CO2: 27 MEQ/L (ref 23–33)
CREAT SERPL-MCNC: 0.7 MG/DL (ref 0.4–1.2)
CREATININE URINE POCT: 100
EOSINOPHIL # BLD: 2.1 %
EOSINOPHILS ABSOLUTE: 0.2 THOU/MM3 (ref 0–0.4)
ERYTHROCYTE [DISTWIDTH] IN BLOOD BY AUTOMATED COUNT: 12.7 % (ref 11.5–14.5)
ERYTHROCYTE [DISTWIDTH] IN BLOOD BY AUTOMATED COUNT: 42.2 FL (ref 35–45)
GFR SERPL CREATININE-BSD FRML MDRD: > 60 ML/MIN/1.73M2
GLUCOSE BLD-MCNC: 110 MG/DL (ref 70–108)
HBA1C MFR BLD: 6.2 %
HCT VFR BLD CALC: 43.4 % (ref 37–47)
HDLC SERPL-MCNC: 37 MG/DL
HEMOGLOBIN: 13.8 GM/DL (ref 12–16)
IMMATURE GRANS (ABS): 0.02 THOU/MM3 (ref 0–0.07)
IMMATURE GRANULOCYTES: 0.2 %
LDL CHOLESTEROL CALCULATED: 95 MG/DL
LYMPHOCYTES # BLD: 22.4 %
LYMPHOCYTES ABSOLUTE: 2.6 THOU/MM3 (ref 1–4.8)
MCH RBC QN AUTO: 28.6 PG (ref 26–33)
MCHC RBC AUTO-ENTMCNC: 31.8 GM/DL (ref 32.2–35.5)
MCV RBC AUTO: 90 FL (ref 81–99)
MICROALBUMIN/CREAT 24H UR: 30 MG/G{CREAT}
MICROALBUMIN/CREAT UR-RTO: <30
MONOCYTES # BLD: 5.6 %
MONOCYTES ABSOLUTE: 0.7 THOU/MM3 (ref 0.4–1.3)
NUCLEATED RED BLOOD CELLS: 0 /100 WBC
PLATELET # BLD: 352 THOU/MM3 (ref 130–400)
PMV BLD AUTO: 9.8 FL (ref 9.4–12.4)
POTASSIUM SERPL-SCNC: 4.4 MEQ/L (ref 3.5–5.2)
RBC # BLD: 4.82 MILL/MM3 (ref 4.2–5.4)
SEG NEUTROPHILS: 68.9 %
SEGMENTED NEUTROPHILS ABSOLUTE COUNT: 8.1 THOU/MM3 (ref 1.8–7.7)
SODIUM BLD-SCNC: 140 MEQ/L (ref 135–145)
TOTAL PROTEIN: 7.3 G/DL (ref 6.1–8)
TRIGLYCERIDE, FASTING: 267 MG/DL (ref 0–199)
TSH SERPL DL<=0.05 MIU/L-ACNC: 1.63 UIU/ML (ref 0.4–4.2)
WBC # BLD: 11.8 THOU/MM3 (ref 4.8–10.8)

## 2022-11-17 PROCEDURE — 3078F DIAST BP <80 MM HG: CPT | Performed by: FAMILY MEDICINE

## 2022-11-17 PROCEDURE — 3074F SYST BP LT 130 MM HG: CPT | Performed by: FAMILY MEDICINE

## 2022-11-17 PROCEDURE — 99214 OFFICE O/P EST MOD 30 MIN: CPT | Performed by: FAMILY MEDICINE

## 2022-11-17 PROCEDURE — 3044F HG A1C LEVEL LT 7.0%: CPT | Performed by: FAMILY MEDICINE

## 2022-11-17 PROCEDURE — 83036 HEMOGLOBIN GLYCOSYLATED A1C: CPT | Performed by: FAMILY MEDICINE

## 2022-11-17 PROCEDURE — 82044 UR ALBUMIN SEMIQUANTITATIVE: CPT | Performed by: FAMILY MEDICINE

## 2022-11-17 RX ORDER — METFORMIN HYDROCHLORIDE 500 MG/1
500 TABLET, EXTENDED RELEASE ORAL
Qty: 90 TABLET | Refills: 1 | Status: SHIPPED | OUTPATIENT
Start: 2022-11-17

## 2022-11-17 RX ORDER — LABETALOL 100 MG/1
100 TABLET, FILM COATED ORAL 2 TIMES DAILY
Qty: 180 TABLET | Refills: 1 | Status: SHIPPED | OUTPATIENT
Start: 2022-11-17

## 2022-11-17 RX ORDER — NIFEDIPINE 60 MG/1
60 TABLET, FILM COATED, EXTENDED RELEASE ORAL DAILY
Qty: 90 TABLET | Refills: 1 | Status: SHIPPED | OUTPATIENT
Start: 2022-11-17 | End: 2022-12-01

## 2022-11-17 RX ORDER — SEMAGLUTIDE 1.34 MG/ML
0.25 INJECTION, SOLUTION SUBCUTANEOUS WEEKLY
Qty: 1.5 ML | Refills: 0 | Status: SHIPPED | OUTPATIENT
Start: 2022-11-17 | End: 2022-12-01

## 2022-11-17 ASSESSMENT — ENCOUNTER SYMPTOMS
VOMITING: 0
CONSTIPATION: 0
NAUSEA: 0
BLOOD IN STOOL: 0
DIARRHEA: 0
COUGH: 0
SHORTNESS OF BREATH: 0

## 2022-11-17 NOTE — PROGRESS NOTES
MG/1.5ML SOPN, Inject 0.25 mg into the skin once a week, Disp: 1.5 mL, Rfl: 0    omeprazole (PRILOSEC) 20 MG delayed release capsule, Take 1 capsule by mouth every morning (before breakfast) (Patient not taking: Reported on 11/17/2022), Disp: 90 capsule, Rfl: 1    NONFORMULARY, Stress Calm - for anxiety, Disp: , Rfl:     Subjective:      Review of Systems   Constitutional:  Negative for chills, fatigue and fever. Respiratory:  Negative for cough and shortness of breath. Cardiovascular:  Negative for chest pain and palpitations. Gastrointestinal:  Negative for blood in stool, constipation, diarrhea, nausea and vomiting. Skin:  Negative for rash. Psychiatric/Behavioral:  Negative for decreased concentration, dysphoric mood, self-injury, sleep disturbance and suicidal ideas. The patient is nervous/anxious. Objective:     Vitals:    11/17/22 0914 11/17/22 0919   BP: (!) 150/80 (!) 144/82   Site: Left Wrist Right Wrist   Position: Sitting Sitting   Pulse: 85    Resp: 16    SpO2: 97%    Weight: (!) 305 lb (138.3 kg)    Height: 5' 2\" (1.575 m)        Physical Exam  Vitals reviewed. Constitutional:       General: She is not in acute distress. Appearance: She is well-developed. She is obese. She is not ill-appearing or toxic-appearing. HENT:      Head: Normocephalic and atraumatic. Eyes:      General:         Right eye: No discharge. Left eye: No discharge. Conjunctiva/sclera: Conjunctivae normal.   Cardiovascular:      Rate and Rhythm: Normal rate and regular rhythm. Heart sounds: Normal heart sounds. Pulmonary:      Effort: Pulmonary effort is normal. No respiratory distress. Breath sounds: Normal breath sounds. No stridor. No wheezing, rhonchi or rales. Abdominal:      Palpations: Abdomen is soft. Tenderness: There is no abdominal tenderness. Musculoskeletal:      Cervical back: Neck supple. Lymphadenopathy:      Cervical: No cervical adenopathy.    Skin: General: Skin is warm and dry. Comments: No obvious rash. Neurological:      Mental Status: She is alert. Comments: No obvious focal deficit. Psychiatric:         Mood and Affect: Mood normal.         Behavior: Behavior normal.         Thought Content: Thought content normal.         Judgment: Judgment normal.       Lab Results   Component Value Date    WBC 9.7 12/07/2021    HGB 14.0 12/07/2021    HCT 44.2 12/07/2021     12/07/2021    CHOL 178 12/07/2021    TRIG 196 12/07/2021    HDL 39 12/07/2021    ALT 28 12/07/2021    AST 24 12/07/2021     12/07/2021    K 4.0 12/07/2021     12/07/2021    CREATININE 0.8 12/07/2021    BUN 12 12/07/2021    CO2 27 12/07/2021    TSH 2.300 12/07/2021    LABA1C 6.2 11/17/2022       /Plan:   1. Type 2 diabetes mellitus with other specified complication, without long-term current use of insulin (HCC)  Side effects on Rybelsus. Will trial injectable instead. Checking labs. Continue metformin. If were to get pregnant would likely stop Semaglutide but weight loss prior to pregnancy would be ideal.    - POCT glycosylated hemoglobin (Hb A1C)  - POCT microalbumin  - metFORMIN (GLUCOPHAGE-XR) 500 MG extended release tablet; Take 1 tablet by mouth daily (with breakfast)  Dispense: 90 tablet; Refill: 1  - CBC with Auto Differential; Future  - Comprehensive Metabolic Panel; Future  - TSH With Reflex Ft4; Future  - Lipid, Fasting; Future  - Semaglutide,0.25 or 0.5MG/DOS, (OZEMPIC, 0.25 OR 0.5 MG/DOSE,) 2 MG/1.5ML SOPN; Inject 0.25 mg into the skin once a week  Dispense: 1.5 mL; Refill: 0  - Lipid, Fasting  - TSH With Reflex Ft4  - Comprehensive Metabolic Panel  - CBC with Auto Differential    2. Essential hypertension  Will modify regimen to medications commonly used in pregnancy given contemplating this. Follow closely.   - NIFEdipine (ADALAT CC) 60 MG extended release tablet; Take 1 tablet by mouth daily  Dispense: 90 tablet;  Refill: 1  - labetalol (Stevo Kobus) 100 MG tablet; Take 1 tablet by mouth 2 times daily  Dispense: 180 tablet; Refill: 1    3. Anxiety  Stable overall on beta blocker. 4. Encounter for preconception consultation  Discussed risks including age and morbid obesity and diabetes. Would want to get blood pressure under better control but is close. Diabetes control is adequate. No definite contraindication but would need close management and likely insulin during a pregnancy. Ideally would lose weight and get blood pressure under better control first. Will optimize medications and start folic acid. Start a multivitamin with folic acid. 5. Morbid obesity ([address]  Continue to work on the dietary measures and exercise. Limit carbohydrate intake to around 50 gm per meal maximum (less carbohydrate okay), exercise 30 minutes at least 5 days per week doing something that you enjoy. It is okay to trial intermittent fasting (eat breakfast and skip lunch and dinner a couple times a week). When fasting stay hydrated by drinking plenty of water, unsweetened tea, black coffee, or bone broth. Continue metformin. Start Rybelsus. - metFORMIN (GLUCOPHAGE-XR) 500 MG extended release tablet; Take 1 tablet by mouth daily (with breakfast)  Dispense: 90 tablet; Refill: 1        Return in about 1 month (around 12/17/2022) for Follow-up chronic conditions.     Orders Placed   Orders Placed This Encounter   Procedures    CBC with Auto Differential     Standing Status:   Future     Number of Occurrences:   1     Standing Expiration Date:   11/17/2023    Comprehensive Metabolic Panel     Standing Status:   Future     Number of Occurrences:   1     Standing Expiration Date:   11/17/2023    TSH With Reflex Ft4     Standing Status:   Future     Number of Occurrences:   1     Standing Expiration Date:   11/17/2023    Lipid, Fasting     Standing Status:   Future     Number of Occurrences:   1     Standing Expiration Date:   11/17/2023    Glomerular Filtration Rate,

## 2022-11-18 ENCOUNTER — TELEPHONE (OUTPATIENT)
Dept: FAMILY MEDICINE CLINIC | Age: 37
End: 2022-11-18

## 2022-11-18 NOTE — TELEPHONE ENCOUNTER
----- Message from James Machado MD sent at 11/17/2022 10:52 PM EST -----  Stable labs. No change in plan based on these.   Mild intermittent leukocytosis (elevated WBC) likely viral.

## 2022-11-26 ENCOUNTER — TELEPHONE (OUTPATIENT)
Dept: FAMILY MEDICINE CLINIC | Age: 37
End: 2022-11-26

## 2022-11-26 DIAGNOSIS — E11.69 TYPE 2 DIABETES MELLITUS WITH OTHER SPECIFIED COMPLICATION, WITHOUT LONG-TERM CURRENT USE OF INSULIN (HCC): Primary | ICD-10-CM

## 2022-11-26 NOTE — TELEPHONE ENCOUNTER
Please call pharmacy to check on status of patient's prescriptions. She is reporting that one of the pills was very expensive and that she could not afford it. She thinks it was for blood pressure? Please let me know which one and if the pharmacy has a preferred substitution that would be safe if planning pregnancy. Also she reports that the pharmacy needed something from us about her injection? Thanks.

## 2022-11-29 NOTE — TELEPHONE ENCOUNTER
Nifedipine 60mg tab ER was $90.70 for a 90 day supply. Pharmacy states that they can not get ozempic in at this time.

## 2022-11-30 DIAGNOSIS — I10 ESSENTIAL HYPERTENSION: ICD-10-CM

## 2022-11-30 NOTE — TELEPHONE ENCOUNTER
Can we ask the pharmacist if there is a good substitution for blood pressure control in a patient planning pregnancy that is less expensive. Also on labetalol. Also do they have a substitution for Ozempic recommended. Thanks.

## 2022-11-30 NOTE — TELEPHONE ENCOUNTER
Pharmacist at [facility] states that methyldopa could be tried for BP and recommendation for ozempic is trulicity.

## 2022-12-01 DIAGNOSIS — I10 PRIMARY HYPERTENSION: Primary | ICD-10-CM

## 2022-12-01 RX ORDER — AMLODIPINE BESYLATE 10 MG/1
10 TABLET ORAL DAILY
Qty: 90 TABLET | Refills: 0 | OUTPATIENT
Start: 2022-12-01

## 2022-12-01 RX ORDER — METHYLDOPA 250 MG/1
250 TABLET, FILM COATED ORAL 2 TIMES DAILY
Qty: 60 TABLET | Refills: 2 | Status: SHIPPED | OUTPATIENT
Start: 2022-12-01 | End: 2023-03-01

## 2022-12-01 RX ORDER — DULAGLUTIDE 0.75 MG/.5ML
0.75 INJECTION, SOLUTION SUBCUTANEOUS WEEKLY
Qty: 4 ADJUSTABLE DOSE PRE-FILLED PEN SYRINGE | Refills: 2 | Status: SHIPPED | OUTPATIENT
Start: 2022-12-01

## 2022-12-23 DIAGNOSIS — I10 ESSENTIAL HYPERTENSION: ICD-10-CM

## 2022-12-23 RX ORDER — AMLODIPINE BESYLATE 10 MG/1
10 TABLET ORAL DAILY
Qty: 90 TABLET | Refills: 3 | Status: SHIPPED | OUTPATIENT
Start: 2022-12-23

## 2022-12-23 NOTE — PROGRESS NOTES
Spoke with Dr. Sebastien Carter, patient needs a refill on her amlodipine, Dr. Sebastien Carter is [place] the [place]. Refills sent.

## 2023-02-07 ENCOUNTER — TELEPHONE (OUTPATIENT)
Dept: FAMILY MEDICINE CLINIC | Age: 38
End: 2023-02-07

## 2023-02-08 ENCOUNTER — OFFICE VISIT (OUTPATIENT)
Dept: FAMILY MEDICINE CLINIC | Age: 38
End: 2023-02-08

## 2023-02-08 VITALS
BODY MASS INDEX: 53.92 KG/M2 | DIASTOLIC BLOOD PRESSURE: 88 MMHG | TEMPERATURE: 98.6 F | HEART RATE: 100 BPM | SYSTOLIC BLOOD PRESSURE: 132 MMHG | HEIGHT: 62 IN | RESPIRATION RATE: 16 BRPM | WEIGHT: 293 LBS | OXYGEN SATURATION: 97 %

## 2023-02-08 DIAGNOSIS — R11.0 NAUSEA: ICD-10-CM

## 2023-02-08 DIAGNOSIS — R12 HEARTBURN: ICD-10-CM

## 2023-02-08 DIAGNOSIS — N92.6 LATE MENSTRUATION: Primary | ICD-10-CM

## 2023-02-08 DIAGNOSIS — R10.84 GENERALIZED ABDOMINAL PAIN: ICD-10-CM

## 2023-02-08 LAB
BILIRUBIN, POC: NORMAL
BLOOD URINE, POC: NORMAL
CLARITY, POC: NORMAL
COLOR, POC: NORMAL
GLUCOSE URINE, POC: NORMAL
HCG, URINE, POC: NEGATIVE
KETONES, POC: NORMAL
LEUKOCYTE EST, POC: NORMAL
Lab: NORMAL
NEGATIVE QC PASS/FAIL: NORMAL
NITRITE, POC: NORMAL
PH, POC: 5
POSITIVE QC PASS/FAIL: NORMAL
PROTEIN, POC: NORMAL
SPECIFIC GRAVITY, POC: 1.02
UROBILINOGEN, POC: 0.2

## 2023-02-08 RX ORDER — CYCLOBENZAPRINE HCL 5 MG
TABLET ORAL
COMMUNITY
Start: 2023-02-01 | End: 2023-02-16

## 2023-02-08 RX ORDER — MECLIZINE HYDROCHLORIDE 25 MG/1
TABLET ORAL
COMMUNITY
Start: 2023-02-01 | End: 2023-02-16

## 2023-02-08 RX ORDER — ONDANSETRON 4 MG/1
4 TABLET, ORALLY DISINTEGRATING ORAL 3 TIMES DAILY PRN
Qty: 21 TABLET | Refills: 0 | Status: SHIPPED | OUTPATIENT
Start: 2023-02-08 | End: 2023-02-16

## 2023-02-08 RX ORDER — OMEPRAZOLE 20 MG/1
40 CAPSULE, DELAYED RELEASE ORAL
Qty: 90 CAPSULE | Refills: 0
Start: 2023-02-08

## 2023-02-08 ASSESSMENT — ENCOUNTER SYMPTOMS
SINUS PRESSURE: 0
EYE PAIN: 0
EYE DISCHARGE: 0
CHEST TIGHTNESS: 0
SHORTNESS OF BREATH: 0
BACK PAIN: 0
NAUSEA: 0
COLOR CHANGE: 0
ABDOMINAL PAIN: 1
COUGH: 0
WHEEZING: 0
CONSTIPATION: 0
VOMITING: 0
VOICE CHANGE: 0
EYE ITCHING: 0
CHOKING: 0
ABDOMINAL DISTENTION: 0

## 2023-02-08 ASSESSMENT — PATIENT HEALTH QUESTIONNAIRE - PHQ9
2. FEELING DOWN, DEPRESSED OR HOPELESS: 0
SUM OF ALL RESPONSES TO PHQ QUESTIONS 1-9: 0
SUM OF ALL RESPONSES TO PHQ QUESTIONS 1-9: 0
SUM OF ALL RESPONSES TO PHQ9 QUESTIONS 1 & 2: 0
SUM OF ALL RESPONSES TO PHQ QUESTIONS 1-9: 0
1. LITTLE INTEREST OR PLEASURE IN DOING THINGS: 0
SUM OF ALL RESPONSES TO PHQ QUESTIONS 1-9: 0

## 2023-02-08 NOTE — PROGRESS NOTES
[address], [place], [place], [address]  [address]   Fax [address] [place] [address]  Dept: [phone]  Dept Fax: [phone]  Loc: [phone]      Zehra Travis is a 42 y.o. female who presents todayfor Dizziness (Vomiting, diarrhea, and abdominal pain started last week was seen at clinic in [address] [place] [address], had some relief. Sx returned yesterday after taking muscle relaxer. Wants labs done. )      HPI:       being used. Patient speaks Latvian. Was 53 days without a period, this am started her period. Weight going up. Actually 2 lbs heavier than last year, reviewed this with her, she is overweight, but her weight has been consistent for last year. Serum preg? The patient has No Known Allergies. Past MedicalHistory  Mark Duggan  has a past medical history of Gallstone and Hypertension. Medications    Current Outpatient Medications:     omeprazole (PRILOSEC) 20 MG delayed release capsule, Take 2 capsules by mouth every morning (before breakfast) Take 2 daily for 14 days, then go back to 20 mg daily. , Disp: 90 capsule, Rfl: 0    amLODIPine (NORVASC) 10 MG tablet, Take 1 tablet by mouth daily, Disp: 90 tablet, Rfl: 3    metFORMIN (GLUCOPHAGE-XR) 500 MG extended release tablet, Take 1 tablet by mouth daily (with breakfast), Disp: 90 tablet, Rfl: 1    NONFORMULARY, Stress Calm - for anxiety, Disp: , Rfl:     Subjective:      Review of Systems   Constitutional:  Negative for appetite change, chills, fatigue and fever. HENT:  Negative for congestion, ear discharge, sinus pressure, tinnitus and voice change. Eyes:  Negative for pain, discharge, itching and visual disturbance. Respiratory:  Negative for cough, choking, chest tightness, shortness of breath and wheezing. Cardiovascular:  Negative for chest pain, palpitations and leg swelling. Gastrointestinal:  Positive for abdominal pain. Negative for abdominal distention, constipation, nausea and vomiting. Acid reflux    Endocrine: Negative for cold intolerance and heat intolerance. Genitourinary:  Positive for menstrual problem (menstrual cramps). Negative for dysuria, hematuria, vaginal discharge and vaginal pain. Musculoskeletal:  Negative for arthralgias, back pain, gait problem, neck pain and neck stiffness. Skin:  Negative for color change and rash. Neurological:  Negative for dizziness, syncope, speech difficulty, light-headedness, numbness and headaches. Psychiatric/Behavioral:  Negative for behavioral problems, confusion, self-injury and suicidal ideas. The patient is not nervous/anxious. Objective:        Vitals:    02/08/23 1308   BP: 132/88   Site: Left Upper Arm   Position: Sitting   Cuff Size: Large Adult   Pulse: 100   Resp: 16   Temp: 98.6 °F (37 °C)   TempSrc: Oral   SpO2: 97%   Weight: (!) 307 lb (139.3 kg)   Height: 5' 2\" (1.575 m)      Physical Exam  Vitals and nursing note reviewed. Constitutional:       General: She is not in acute distress. Appearance: She is well-developed. She is obese. She is not ill-appearing or diaphoretic. HENT:      Head: Normocephalic and atraumatic. Right Ear: Tympanic membrane, ear canal and external ear normal. Tympanic membrane is not injected or erythematous. Left Ear: Tympanic membrane, ear canal and external ear normal. Tympanic membrane is not injected or erythematous. Nose: Nose normal.      Mouth/Throat:      Pharynx: Uvula midline. Eyes:      General:         Right eye: No discharge. Left eye: No discharge. Conjunctiva/sclera: Conjunctivae normal.      Pupils: Pupils are equal, round, and reactive to light. Neck:      Thyroid: No thyromegaly. Trachea: Trachea normal.   Cardiovascular:      Rate and Rhythm: Normal rate and regular rhythm. Pulses: Normal pulses.       Heart sounds: Normal heart sounds, S1 normal and S2 normal. No murmur heard. No friction rub. No gallop. Pulmonary:      Effort: Pulmonary effort is normal. No respiratory distress. Breath sounds: Normal breath sounds. No wheezing or rales. Chest:      Chest wall: No tenderness. Abdominal:      General: Bowel sounds are normal. There is no distension. Palpations: Abdomen is soft. There is no mass. Tenderness: There is no abdominal tenderness. There is no guarding or rebound. Musculoskeletal:         General: No tenderness or deformity. Normal range of motion. Cervical back: Full passive range of motion without pain, normal range of motion and neck supple. No rigidity or tenderness. Lymphadenopathy:      Cervical: No cervical adenopathy. Skin:     General: Skin is warm and dry. Capillary Refill: Capillary refill takes less than 2 seconds. Neurological:      Mental Status: She is alert and oriented to person, place, and time. Deep Tendon Reflexes: Reflexes are normal and symmetric. Psychiatric:         Mood and Affect: Mood normal.         Behavior: Behavior normal.       Assessment/Plan:       Jonathon Everett was seen today for dizziness. Diagnoses and all orders for this visit:    Late menstruation  Urine preg negative today. Patient reports she did start her menses this am   Abdominal cramping r/t menstruation. Encouraged PRN ibuprofen. Heating pad to abdomen. UA with no leukocytes noted. -     POC Pregnancy Urine Qual    Generalized abdominal pain  -     POCT Urinalysis No Micro (Auto)    Heartburn  Long hx of reflux, has not been taking her omeprazole. New script provided. Encouraged to take higher dose for 2 weeks then 20 mg daily after. Reduce foods that trigger reflux. Voiced understanding.   -     omeprazole (PRILOSEC) 20 MG delayed release capsule;  Take 2 capsules by mouth every morning (before breakfast) Take 2 daily for 14 days, then go back to 20 mg daily. Nausea  Nausea likely related to reflux and menstruation. Rx ZOfran for prn use. -     Discontinue: ondansetron (ZOFRAN-ODT) 4 MG disintegrating tablet; Take 1 tablet by mouth 3 times daily as needed for Nausea or Vomiting      Return in about 1 week (around 2/15/2023), or if symptoms worsen or fail to improve, for with Dr. Trent Bird . Patient instructions given and reviewed.     Electronicallysigned by CHARLIE Rodriguez CNP on 2/19/2023 at 8:07 PM

## 2023-02-08 NOTE — TELEPHONE ENCOUNTER
Called by patient with dizziness, nausea, and heartburn for about 10 days. Not worse. Symptoms wax and wane but have not resolved. Saw 8th [address] and reportedly was diagnosed with cervical strain and treated meclizine, zofran and flexeril. Stopped GLP-1 since not losing weight and possible side effect. Taking other usual medications. No fevers. No belly pain or diarrhea. Is late on menses and wonders if pregnant but test negative last week. Recommended repeat urine pregnancy test and be seen in office tomorrow. Agree with her OB/GYN that if high concern for pregnancy could also check serum test.  Indications for prompt return and/or emergent presentation if worsening reviewed in detail.

## 2023-02-16 ENCOUNTER — OFFICE VISIT (OUTPATIENT)
Dept: FAMILY MEDICINE CLINIC | Age: 38
End: 2023-02-16
Payer: COMMERCIAL

## 2023-02-16 VITALS
DIASTOLIC BLOOD PRESSURE: 78 MMHG | HEIGHT: 62 IN | SYSTOLIC BLOOD PRESSURE: 138 MMHG | BODY MASS INDEX: 53.92 KG/M2 | RESPIRATION RATE: 16 BRPM | HEART RATE: 77 BPM | WEIGHT: 293 LBS | OXYGEN SATURATION: 98 %

## 2023-02-16 DIAGNOSIS — I10 ESSENTIAL HYPERTENSION: Primary | ICD-10-CM

## 2023-02-16 DIAGNOSIS — Z23 NEED FOR PNEUMOCOCCAL VACCINATION: ICD-10-CM

## 2023-02-16 DIAGNOSIS — E66.01 MORBID OBESITY (HCC): ICD-10-CM

## 2023-02-16 DIAGNOSIS — Z23 NEED FOR HEPATITIS B VACCINATION: ICD-10-CM

## 2023-02-16 DIAGNOSIS — E11.69 TYPE 2 DIABETES MELLITUS WITH OTHER SPECIFIED COMPLICATION, WITHOUT LONG-TERM CURRENT USE OF INSULIN (HCC): ICD-10-CM

## 2023-02-16 PROCEDURE — 90746 HEPB VACCINE 3 DOSE ADULT IM: CPT | Performed by: FAMILY MEDICINE

## 2023-02-16 PROCEDURE — 90677 PCV20 VACCINE IM: CPT | Performed by: FAMILY MEDICINE

## 2023-02-16 PROCEDURE — 90471 IMMUNIZATION ADMIN: CPT | Performed by: FAMILY MEDICINE

## 2023-02-16 PROCEDURE — 99214 OFFICE O/P EST MOD 30 MIN: CPT | Performed by: FAMILY MEDICINE

## 2023-02-16 PROCEDURE — 3078F DIAST BP <80 MM HG: CPT | Performed by: FAMILY MEDICINE

## 2023-02-16 PROCEDURE — 90472 IMMUNIZATION ADMIN EACH ADD: CPT | Performed by: FAMILY MEDICINE

## 2023-02-16 PROCEDURE — 3075F SYST BP GE 130 - 139MM HG: CPT | Performed by: FAMILY MEDICINE

## 2023-02-16 SDOH — ECONOMIC STABILITY: FOOD INSECURITY: WITHIN THE PAST 12 MONTHS, YOU WORRIED THAT YOUR FOOD WOULD RUN OUT BEFORE YOU GOT MONEY TO BUY MORE.: NEVER TRUE

## 2023-02-16 SDOH — ECONOMIC STABILITY: HOUSING INSECURITY
IN THE LAST 12 MONTHS, WAS THERE A TIME WHEN YOU DID NOT HAVE A STEADY PLACE TO SLEEP OR SLEPT IN A SHELTER (INCLUDING NOW)?: NO

## 2023-02-16 SDOH — ECONOMIC STABILITY: INCOME INSECURITY: HOW HARD IS IT FOR YOU TO PAY FOR THE VERY BASICS LIKE FOOD, HOUSING, MEDICAL CARE, AND HEATING?: NOT HARD AT ALL

## 2023-02-16 SDOH — ECONOMIC STABILITY: FOOD INSECURITY: WITHIN THE PAST 12 MONTHS, THE FOOD YOU BOUGHT JUST DIDN'T LAST AND YOU DIDN'T HAVE MONEY TO GET MORE.: NEVER TRUE

## 2023-02-16 ASSESSMENT — ENCOUNTER SYMPTOMS
ABDOMINAL PAIN: 0
COUGH: 0
DIARRHEA: 0
SHORTNESS OF BREATH: 0
VOMITING: 0
NAUSEA: 0

## 2023-02-16 NOTE — PROGRESS NOTES
Delilah Culp is a 42 y.o. female who presents today for:  Chief Complaint   Patient presents with    Follow-up         HPI:   Delilah Culp is 42 y.o. who presents today for follow up. Doing well overall. Has no complaints. Nausea and vomiting gone. Seeing Gynecologist for irregular menses  Blood pressure well controlled on Amlodipine. Tolerating Metformin and Prilosec well.      Objective:     Vitals:    02/16/23 0932   BP: 138/78   Site: Left Upper Arm   Position: Sitting   Cuff Size: Large Adult   Pulse: 77   Resp: 16   SpO2: 98%   Weight: (!) 306 lb 6.4 oz (139 kg)   Height: 5' 2\" (1.575 m)       Wt Readings from Last 3 Encounters:   02/16/23 (!) 306 lb 6.4 oz (139 kg)   02/08/23 (!) 307 lb (139.3 kg)   11/17/22 (!) 305 lb (138.3 kg)       BP Readings from Last 3 Encounters:   02/16/23 138/78   02/08/23 132/88   11/17/22 (!) 144/82       Lab Results   Component Value Date    WBC 11.8 (H) 11/17/2022    HGB 13.8 11/17/2022    HCT 43.4 11/17/2022    MCV 90.0 11/17/2022     11/17/2022     Lab Results   Component Value Date     11/17/2022    K 4.4 11/17/2022     11/17/2022    CO2 27 11/17/2022    BUN 16 11/17/2022    CREATININE 0.7 11/17/2022    GLUCOSE 110 (H) 11/17/2022    CALCIUM 9.4 11/17/2022    PROT 7.3 11/17/2022    LABALBU 4.1 11/17/2022    BILITOT 0.2 (L) 11/17/2022    ALKPHOS 72 11/17/2022    AST 16 11/17/2022    ALT 20 11/17/2022    LABGLOM >60 11/17/2022    GFRAA >60 05/10/2019     Lab Results   Component Value Date    TSH 1.630 11/17/2022    T4FREE 1.31 03/04/2021     Lab Results   Component Value Date    LABA1C 6.2 11/17/2022     No results found for: EAG  Lab Results   Component Value Date    CHOL 178 12/07/2021    CHOL 194 03/04/2021    CHOL 202 (H) 02/12/2020     Lab Results   Component Value Date    TRIG 196 12/07/2021    TRIG 107 03/04/2021    TRIG 166 02/12/2020     Lab Results   Component Value Date    HDL 37 11/17/2022    HDL 39 12/07/2021    HDL 44 03/04/2021     Lab Results   Component Value Date    LDLCHOLESTEROL 118 05/10/2019    [address] 95 11/17/2022    LDLCALC 100 12/07/2021    LDLCALC 129 03/04/2021       No results found for: LABMICR, [id number]    Review of Systems   Constitutional:  Negative for fatigue. Respiratory:  Negative for cough and shortness of breath. Cardiovascular:  Negative for chest pain and palpitations. Gastrointestinal:  Negative for abdominal pain, diarrhea, nausea and vomiting. Neurological:  Negative for light-headedness and headaches. Psychiatric/Behavioral: Negative. Physical Exam  Vitals reviewed. Constitutional:       General: She is not in acute distress. Appearance: She is not ill-appearing. HENT:      Mouth/Throat:      Mouth: Mucous membranes are moist.   Eyes:      General:         Right eye: No discharge. Left eye: No discharge. Cardiovascular:      Rate and Rhythm: Normal rate and regular rhythm. Heart sounds: Normal heart sounds. No murmur heard. No friction rub. No gallop. Pulmonary:      Effort: Pulmonary effort is normal. No respiratory distress. Breath sounds: Normal breath sounds. No stridor. No wheezing, rhonchi or rales. Abdominal:      General: There is no distension. Palpations: Abdomen is soft. Tenderness: There is no abdominal tenderness. There is no guarding or rebound. Skin:     General: Skin is warm. Neurological:      General: No focal deficit present. Mental Status: She is alert. Mental status is at baseline.    Psychiatric:         Mood and Affect: Mood normal.         Behavior: Behavior normal.     Visual inspection:  Deformity/amputation: absent  Skin lesions/pre-ulcerative calluses: absent  Edema: right- negative, left- negative    Sensory exam:  Monofilament sensation: normal  (minimum of 5 random plantar locations tested, avoiding callused areas - > 1 area with absence of sensation is + for neuropathy)    Plus at least one of the following:  Pulses: normal,       Immunization History   Administered Date(s) Administered    COVID-19, MODERNA BLUE border, Primary or Immunocompromised, (age 12y+), IM, 100 mcg/0.5mL 03/24/2021, 04/21/2021, 01/05/2022    Hepatitis B Adult (Engerix-B) 02/16/2023    Influenza Virus Vaccine 10/15/2021, 10/17/2022    Pneumococcal conjugate PCV20, PF (Prevnar 20) 02/16/2023    Tdap (Boostrix, Adacel) 07/16/2020       Health Maintenance Due   Topic Date Due    Diabetic retinal exam  Never done    COVID-19 Vaccine (4 - Booster for Moderna series) 03/02/2022       Food Insecurity: No Food Insecurity    Worried About Running Out of Food in the Last Year: Never true    Ran Out of Food in the Last Year: Never true       Assessment / Plan:      Diagnosis Orders   1. Essential hypertension        2. Morbid obesity (HCC)        3. Type 2 diabetes mellitus with other specified complication, without long-term current use of insulin (HCC)  HM DIABETES FOOT EXAM      4. Need for pneumococcal vaccination  Pneumococcal, PCV20, PREVNAR 20, (age 18 yrs+), IM, PF      5. Need for hepatitis B vaccination  Hep B, ENGERIX-B, (age 20 yrs+), IM, 1mL, 3-dose          Doing well.  Hypertension stable, continue Norvasc  Diabetes stable, continue Metformin  Continue diet and exercise.   Vaccines today.        Return in about 4 months (around 6/16/2023).          Medications Prescribed:  No orders of the defined types were placed in this encounter.      Future Appointments   Date Time Provider Department Center   6/5/2023 12:30 PM MD [facility]ELISSA [facility]       Patient given educational materials - see patient instructions.  Discussed use, benefit, and sideeffects of prescribed medications.  All patient questions answered.  Pt voiced understanding. Reviewed health maintenance.  Instructed to continue current medications, diet and exercise.  Patient agreed with treatment plan.Follow up as directed.     Plan discussed with  attending physician.      Electronically signed by Belita Care on 2/16/2023 at 1:27 PM

## 2023-02-16 NOTE — PROGRESS NOTES
Attending attestation:  I personally performed and participated key or critical portions of the evaluation and management including personally performing the exam and medical decision making. I verify the accuracy of the documentation by the resident with the following addition or changes: Here for follow-up. Had period. Feeling well. Nausea and vomiting gone. Sees gynecologist for irregular menses. Tolerating metformin for diabetes. Blood pressure is well controlled off labetalol and methyldopa. Only on amlodipine at the moment. Stopped GLP-1 with side effects and no weight loss. Dizziness resolved. Eye exam encouraged. Vaccines given. Should get COVID-19 booster at pharmacy.         Electronically signed by Susan Steward MD on 2/16/2023 at 10:25 AM

## 2023-02-16 NOTE — Clinical Note
[address] [address]  Phone: [phone]  Fax: [phone], [place]        February 16, 2023     Patient: Jose Connors   YOB: 1985   Date of Visit: 2/16/2023       To Whom It May Concern: It is my medical opinion that Mitzi Rousseau {Work release (duty restriction):24648}. If you have any questions or concerns, please don't hesitate to call.     Sincerely,        Praveena Vergara MD

## 2023-02-16 NOTE — PROGRESS NOTES
After obtaining consent, and per orders of Dr. Cynthia Meza, injection of Prevnar 20 given in Right deltoid and injection of Hep B left deltoid  by Robert Araujo MA. Patient instructed to remain in clinic for 20 minutes afterwards, and to report any adverse reaction to me immediately.     Immunizations Administered       Name Date Dose Route    Hepatitis B Adult (Engerix-B) 2/16/2023 1 mL Intramuscular    Site: Deltoid- Left    Lot: [id number]    NDC: 31934-839-51    Pneumococcal conjugate PCV20, PF (Prevnar 20) 2/16/2023 0.5 mL Intramuscular    Site: Deltoid- Right    Lot: [id number]    NDC: 3381-1682-26          Patient tolerated injections well

## 2023-02-16 NOTE — LETTER
[address] [address] [address] [place] [address]  Phone: [phone]  Fax: [phone], [place]        February 16, 2023     Patient: Zehra Travis   YOB: 1985   Date of Visit: 2/16/2023       To Whom it May Concern:    Zehra Salvage was seen in my clinic on 2/16/2023. If you have any questions or concerns, please don't hesitate to call.     Sincerely,         Eloy Hernández MD

## 2023-02-23 ENCOUNTER — TELEPHONE (OUTPATIENT)
Dept: FAMILY MEDICINE CLINIC | Age: 38
End: 2023-02-23

## 2023-02-23 DIAGNOSIS — Z87.898 HISTORY OF ABNORMAL MAMMOGRAM: ICD-10-CM

## 2023-02-23 DIAGNOSIS — Z12.31 ENCOUNTER FOR SCREENING MAMMOGRAM FOR MALIGNANT NEOPLASM OF BREAST: Primary | ICD-10-CM

## 2023-02-23 NOTE — TELEPHONE ENCOUNTER
Requesting mammogram. History of benign lump per patient report. No current lump. Agreed to order screening test.  Indications for prompt return and/or emergent presentation if worsening reviewed in detail.

## 2023-03-14 ENCOUNTER — HOSPITAL ENCOUNTER (OUTPATIENT)
Dept: WOMENS IMAGING | Age: 38
Discharge: HOME | End: 2023-03-14
Payer: COMMERCIAL

## 2023-03-14 DIAGNOSIS — Z87.898 HISTORY OF ABNORMAL MAMMOGRAM: ICD-10-CM

## 2023-03-14 DIAGNOSIS — Z12.31 ENCOUNTER FOR SCREENING MAMMOGRAM FOR MALIGNANT NEOPLASM OF BREAST: ICD-10-CM

## 2023-03-14 PROCEDURE — 77067 SCR MAMMO BI INCL CAD: CPT

## 2023-03-31 ENCOUNTER — HOSPITAL ENCOUNTER (OUTPATIENT)
Age: 38
Discharge: HOME | End: 2023-03-31
Payer: COMMERCIAL

## 2023-03-31 LAB
DEPRECATED MEAN GLUCOSE BLD GHB EST-ACNC: 153 MG/DL (ref 70–126)
HBA1C MFR BLD HPLC: 7.1 % (ref 4.4–6.4)

## 2023-03-31 PROCEDURE — 83036 HEMOGLOBIN GLYCOSYLATED A1C: CPT

## 2023-03-31 PROCEDURE — 36415 COLL VENOUS BLD VENIPUNCTURE: CPT

## 2023-03-31 PROCEDURE — 83525 ASSAY OF INSULIN: CPT

## 2023-04-01 LAB — INSULIN SERPL-ACNC: 31.6 MU/L

## 2023-06-09 ENCOUNTER — TELEPHONE (OUTPATIENT)
Dept: FAMILY MEDICINE CLINIC | Age: 38
End: 2023-06-09

## 2023-06-19 ENCOUNTER — OFFICE VISIT (OUTPATIENT)
Dept: FAMILY MEDICINE CLINIC | Age: 38
End: 2023-06-19
Payer: COMMERCIAL

## 2023-06-19 VITALS
BODY MASS INDEX: 50.02 KG/M2 | HEIGHT: 64 IN | OXYGEN SATURATION: 99 % | SYSTOLIC BLOOD PRESSURE: 142 MMHG | HEART RATE: 71 BPM | WEIGHT: 293 LBS | DIASTOLIC BLOOD PRESSURE: 92 MMHG | RESPIRATION RATE: 18 BRPM

## 2023-06-19 DIAGNOSIS — M62.838 MUSCLE SPASM: ICD-10-CM

## 2023-06-19 DIAGNOSIS — B35.3 TINEA PEDIS OF BOTH FEET: Primary | ICD-10-CM

## 2023-06-19 DIAGNOSIS — R00.2 PALPITATIONS: ICD-10-CM

## 2023-06-19 DIAGNOSIS — E11.69 TYPE 2 DIABETES MELLITUS WITH OTHER SPECIFIED COMPLICATION, WITHOUT LONG-TERM CURRENT USE OF INSULIN (HCC): ICD-10-CM

## 2023-06-19 DIAGNOSIS — R12 HEARTBURN: ICD-10-CM

## 2023-06-19 DIAGNOSIS — I10 ESSENTIAL HYPERTENSION: ICD-10-CM

## 2023-06-19 DIAGNOSIS — G56.00 CARPAL TUNNEL SYNDROME, UNSPECIFIED LATERALITY: ICD-10-CM

## 2023-06-19 DIAGNOSIS — E66.01 MORBID OBESITY (HCC): ICD-10-CM

## 2023-06-19 PROCEDURE — 3078F DIAST BP <80 MM HG: CPT | Performed by: FAMILY MEDICINE

## 2023-06-19 PROCEDURE — 3074F SYST BP LT 130 MM HG: CPT | Performed by: FAMILY MEDICINE

## 2023-06-19 PROCEDURE — 99214 OFFICE O/P EST MOD 30 MIN: CPT | Performed by: FAMILY MEDICINE

## 2023-06-19 PROCEDURE — 3051F HG A1C>EQUAL 7.0%<8.0%: CPT | Performed by: FAMILY MEDICINE

## 2023-06-19 RX ORDER — METFORMIN HYDROCHLORIDE 500 MG/1
500 TABLET, EXTENDED RELEASE ORAL
Qty: 90 TABLET | Refills: 1 | Status: SHIPPED | OUTPATIENT
Start: 2023-06-19

## 2023-06-19 RX ORDER — MECLIZINE HYDROCHLORIDE 25 MG/1
25 TABLET ORAL 3 TIMES DAILY PRN
COMMUNITY
End: 2023-06-19 | Stop reason: SDUPTHER

## 2023-06-19 RX ORDER — KETOCONAZOLE 20 MG/G
CREAM TOPICAL
Qty: 60 G | Refills: 1 | Status: SHIPPED | OUTPATIENT
Start: 2023-06-19

## 2023-06-19 RX ORDER — OMEPRAZOLE 20 MG/1
40 CAPSULE, DELAYED RELEASE ORAL
Qty: 90 CAPSULE | Refills: 1 | Status: SHIPPED | OUTPATIENT
Start: 2023-06-19

## 2023-06-19 RX ORDER — CYCLOBENZAPRINE HCL 5 MG
5 TABLET ORAL 3 TIMES DAILY PRN
Qty: 30 TABLET | Refills: 2 | Status: SHIPPED | OUTPATIENT
Start: 2023-06-19 | End: 2023-07-19

## 2023-06-19 RX ORDER — MECLIZINE HYDROCHLORIDE 25 MG/1
25 TABLET ORAL 3 TIMES DAILY PRN
Qty: 25 TABLET | Refills: 2 | Status: SHIPPED | OUTPATIENT
Start: 2023-06-19

## 2023-06-19 RX ORDER — DULAGLUTIDE 0.75 MG/.5ML
0.75 INJECTION, SOLUTION SUBCUTANEOUS WEEKLY
Qty: 4 ADJUSTABLE DOSE PRE-FILLED PEN SYRINGE | Refills: 2 | Status: SHIPPED | OUTPATIENT
Start: 2023-06-19

## 2023-06-19 NOTE — PROGRESS NOTES
Health Maintenance Due   Topic Date Due    Diabetic retinal exam  Never done    Hepatitis C screen  Never done    COVID-19 Vaccine (4 - Booster for Moderna series) 03/02/2022    Hepatitis B vaccine (2 of 3 - Risk 3-dose series) 03/16/2023

## 2023-06-19 NOTE — PROGRESS NOTES
[address] [facility]  [address] [facility] [address]  Dept: [phone]  Dept Fax: [phone]  Loc: [phone]      Austin Barrientos is a 42 y.o. female who presents todayfor her medical conditions/complaints as noted below. Austin Barrientos is c/o of Follow-up (Diabetes- Has been taking Metformin. Sometimes she does get dizzy, she does feel tired more. Unsure if it is related to working. ), Hypertension (About 2 weeks ago, did feel dizzy and felt as if heart was beating hard, but did eat something and it started going away. ), and Skin Problem (Soles of feet red and would like them looked at. Sometimes they do hurt, are forming calluses. )      :     HPI    Dizziness intermittently. No passing out. Did feel heart beating more. Has has a lot of stress with work. This happened Saturday. Happened once before about 3 years ago also. Felt when she was quiet. Passed with drinking water. Supervisor brought water. Did not check sugar. Sometimes high at home. Was thinking dizziness was because of GLP-1 but since still there now not thinking so. A1C was 7.1. Muscle spasm right neck; wonders if this is related to dizziness. Feet. Blood pressure is high on amlodipine monotherapy. Heartburn controlled overall. Rarely has with salsa. Takes omeprazole intermittently. Occasional weakness and numbness in right hand. None at present. 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 Was diagnosed with carpal tunnel in the past. Stopping that job helped a lot. Uses cataflan intermittently. Patient Active Problem List   Diagnosis    Impaired fasting glucose    Morbid (severe) obesity due to excess calories (HCC)    Essential hypertension      Goals        Exercise 3x per week (30 min per time)           The patient has No Known Allergies.     Medical History  Jacek Argueta has a past medical history of

## 2023-06-21 ASSESSMENT — ENCOUNTER SYMPTOMS
ABDOMINAL PAIN: 0
SHORTNESS OF BREATH: 0
COUGH: 0

## 2023-12-12 DIAGNOSIS — I10 ESSENTIAL HYPERTENSION: ICD-10-CM

## 2023-12-13 NOTE — TELEPHONE ENCOUNTER
Patient's last appointment was : 6/19/2023  Patient's next appointment is : 1/4/2024  Last refilled:12/23/22, #90, 3 refills

## 2023-12-14 RX ORDER — AMLODIPINE BESYLATE 10 MG/1
10 TABLET ORAL DAILY
Qty: 90 TABLET | Refills: 0 | Status: SHIPPED | OUTPATIENT
Start: 2023-12-14

## 2024-01-04 ENCOUNTER — OFFICE VISIT (OUTPATIENT)
Dept: FAMILY MEDICINE CLINIC | Age: 39
End: 2024-01-04
Payer: COMMERCIAL

## 2024-01-04 VITALS
RESPIRATION RATE: 16 BRPM | OXYGEN SATURATION: 98 % | SYSTOLIC BLOOD PRESSURE: 160 MMHG | HEART RATE: 93 BPM | DIASTOLIC BLOOD PRESSURE: 100 MMHG | WEIGHT: 293 LBS | HEIGHT: 64 IN | BODY MASS INDEX: 50.02 KG/M2

## 2024-01-04 DIAGNOSIS — Z12.31 ENCOUNTER FOR SCREENING MAMMOGRAM FOR MALIGNANT NEOPLASM OF BREAST: ICD-10-CM

## 2024-01-04 DIAGNOSIS — Z11.59 NEED FOR HEPATITIS C SCREENING TEST: ICD-10-CM

## 2024-01-04 DIAGNOSIS — N63.22 MASS OF UPPER INNER QUADRANT OF LEFT BREAST: ICD-10-CM

## 2024-01-04 DIAGNOSIS — E11.69 TYPE 2 DIABETES MELLITUS WITH OTHER SPECIFIED COMPLICATION, WITHOUT LONG-TERM CURRENT USE OF INSULIN (HCC): ICD-10-CM

## 2024-01-04 DIAGNOSIS — Z23 NEED FOR HEPATITIS B VACCINATION: ICD-10-CM

## 2024-01-04 DIAGNOSIS — E66.01 MORBID OBESITY (HCC): ICD-10-CM

## 2024-01-04 DIAGNOSIS — I10 ESSENTIAL HYPERTENSION: ICD-10-CM

## 2024-01-04 DIAGNOSIS — Z00.00 WELL WOMAN EXAM (NO GYNECOLOGICAL EXAM): ICD-10-CM

## 2024-01-04 DIAGNOSIS — M54.2 NECK PAIN: Primary | ICD-10-CM

## 2024-01-04 LAB
ALBUMIN SERPL BCG-MCNC: 4.4 G/DL (ref 3.5–5.1)
ALP SERPL-CCNC: 94 U/L (ref 38–126)
ALT SERPL W/O P-5'-P-CCNC: 28 U/L (ref 11–66)
ANION GAP SERPL CALC-SCNC: 18 MEQ/L (ref 8–16)
AST SERPL-CCNC: 21 U/L (ref 5–40)
BILIRUB SERPL-MCNC: 0.3 MG/DL (ref 0.3–1.2)
BUN SERPL-MCNC: 15 MG/DL (ref 7–22)
CALCIUM SERPL-MCNC: 9.4 MG/DL (ref 8.5–10.5)
CHLORIDE SERPL-SCNC: 99 MEQ/L (ref 98–111)
CHOLESTEROL, FASTING: 241 MG/DL (ref 100–199)
CO2 SERPL-SCNC: 23 MEQ/L (ref 23–33)
CREAT SERPL-MCNC: 0.6 MG/DL (ref 0.4–1.2)
CREATININE URINE POCT: 100
GFR SERPL CREATININE-BSD FRML MDRD: > 60 ML/MIN/1.73M2
GLUCOSE SERPL-MCNC: 107 MG/DL (ref 70–108)
HBA1C MFR BLD: 8.5 %
HDLC SERPL-MCNC: 42 MG/DL
LDLC SERPL CALC-MCNC: 162 MG/DL
MICROALBUMIN/CREAT 24H UR: 30 MG/G{CREAT}
MICROALBUMIN/CREAT UR-RTO: <30
POTASSIUM SERPL-SCNC: 3.6 MEQ/L (ref 3.5–5.2)
PROT SERPL-MCNC: 8.3 G/DL (ref 6.1–8)
SODIUM SERPL-SCNC: 140 MEQ/L (ref 135–145)
TRIGLYCERIDE, FASTING: 184 MG/DL (ref 0–199)
TSH SERPL DL<=0.005 MIU/L-ACNC: 1.6 UIU/ML (ref 0.4–4.2)

## 2024-01-04 PROCEDURE — G8484 FLU IMMUNIZE NO ADMIN: HCPCS | Performed by: FAMILY MEDICINE

## 2024-01-04 PROCEDURE — G8427 DOCREV CUR MEDS BY ELIG CLIN: HCPCS | Performed by: FAMILY MEDICINE

## 2024-01-04 PROCEDURE — 3079F DIAST BP 80-89 MM HG: CPT | Performed by: FAMILY MEDICINE

## 2024-01-04 PROCEDURE — 99396 PREV VISIT EST AGE 40-64: CPT | Performed by: FAMILY MEDICINE

## 2024-01-04 PROCEDURE — 1036F TOBACCO NON-USER: CPT | Performed by: FAMILY MEDICINE

## 2024-01-04 PROCEDURE — G8417 CALC BMI ABV UP PARAM F/U: HCPCS | Performed by: FAMILY MEDICINE

## 2024-01-04 PROCEDURE — 2022F DILAT RTA XM EVC RTNOPTHY: CPT | Performed by: FAMILY MEDICINE

## 2024-01-04 PROCEDURE — 3077F SYST BP >= 140 MM HG: CPT | Performed by: FAMILY MEDICINE

## 2024-01-04 PROCEDURE — 99214 OFFICE O/P EST MOD 30 MIN: CPT | Performed by: FAMILY MEDICINE

## 2024-01-04 PROCEDURE — 90471 IMMUNIZATION ADMIN: CPT | Performed by: FAMILY MEDICINE

## 2024-01-04 PROCEDURE — 83036 HEMOGLOBIN GLYCOSYLATED A1C: CPT | Performed by: FAMILY MEDICINE

## 2024-01-04 PROCEDURE — 90746 HEPB VACCINE 3 DOSE ADULT IM: CPT | Performed by: FAMILY MEDICINE

## 2024-01-04 PROCEDURE — 82044 UR ALBUMIN SEMIQUANTITATIVE: CPT | Performed by: FAMILY MEDICINE

## 2024-01-04 RX ORDER — MECLIZINE HYDROCHLORIDE 25 MG/1
25 TABLET ORAL 3 TIMES DAILY PRN
Qty: 25 TABLET | Refills: 2 | Status: SHIPPED | OUTPATIENT
Start: 2024-01-04

## 2024-01-04 RX ORDER — ORPHENADRINE CITRATE 100 MG/1
100 TABLET, EXTENDED RELEASE ORAL 2 TIMES DAILY PRN
Qty: 20 TABLET | Refills: 3 | Status: SHIPPED | OUTPATIENT
Start: 2024-01-04 | End: 2024-02-03

## 2024-01-04 RX ORDER — METFORMIN HYDROCHLORIDE 500 MG/1
500 TABLET, EXTENDED RELEASE ORAL
Qty: 90 TABLET | Refills: 1 | Status: CANCELLED | OUTPATIENT
Start: 2024-01-04

## 2024-01-04 RX ORDER — DULAGLUTIDE 3 MG/.5ML
3 INJECTION, SOLUTION SUBCUTANEOUS WEEKLY
Qty: 2 ML | Refills: 0 | Status: SHIPPED | OUTPATIENT
Start: 2024-01-04

## 2024-01-04 RX ORDER — AMLODIPINE BESYLATE 10 MG/1
10 TABLET ORAL DAILY
Qty: 90 TABLET | Refills: 0 | Status: SHIPPED | OUTPATIENT
Start: 2024-01-04

## 2024-01-04 RX ORDER — HYDROCHLOROTHIAZIDE 25 MG/1
25 TABLET ORAL EVERY MORNING
Qty: 30 TABLET | Refills: 1 | Status: SHIPPED | OUTPATIENT
Start: 2024-01-04

## 2024-01-04 ASSESSMENT — ENCOUNTER SYMPTOMS
ABDOMINAL PAIN: 0
COUGH: 0
SHORTNESS OF BREATH: 0

## 2024-01-04 ASSESSMENT — PATIENT HEALTH QUESTIONNAIRE - PHQ9
SUM OF ALL RESPONSES TO PHQ9 QUESTIONS 1 & 2: 0
SUM OF ALL RESPONSES TO PHQ QUESTIONS 1-9: 0
1. LITTLE INTEREST OR PLEASURE IN DOING THINGS: 0
SUM OF ALL RESPONSES TO PHQ QUESTIONS 1-9: 0
2. FEELING DOWN, DEPRESSED OR HOPELESS: 0
SUM OF ALL RESPONSES TO PHQ QUESTIONS 1-9: 0
SUM OF ALL RESPONSES TO PHQ QUESTIONS 1-9: 0

## 2024-01-04 NOTE — PROGRESS NOTES
After obtaining consent, and per orders of Dr. Beltre , injection of Hep B  given in Left deltoid by MARILIA FERNÁNDEZ MA. Patient instructed to remain in clinic for 20 minutes afterwards, and to report any adverse reaction to me immediately.    Immunizations Administered       Name Date Dose Route    Hep B, ENGERIX-B, (age 20y+), IM, 1mL 1/4/2024 1 mL Intramuscular    Site: Deltoid- Left    Lot: [id number]    NDC: 01484-178-82          VIS given - patient tolerated injection well   
Session Code  Please provide this session code to the .  [id number]    Waldo # [id number]  
  Component Value Date    WBC 11.8 (H) 11/17/2022    HGB 13.8 11/17/2022    HCT 43.4 11/17/2022     11/17/2022    CHOL 178 12/07/2021    TRIG 196 12/07/2021    HDL 37 11/17/2022    ALT 20 11/17/2022    AST 16 11/17/2022     11/17/2022    K 4.4 11/17/2022     11/17/2022    CREATININE 0.7 11/17/2022    BUN 16 11/17/2022    CO2 27 11/17/2022    TSH 1.630 11/17/2022    LABA1C 8.5 (A) 01/04/2024       /Plan:   1. Well woman exam (no gynecological exam)  Well check. Routine care.      2. Type 2 diabetes mellitus with other specified complication, without long-term current use of insulin (HCC)  Uncontrolled but side effects. Nausea on metformin. Will double Trulicity. Re-check in 1 month. Plan to go up again at that time if tolerated.    - POCT glycosylated hemoglobin (Hb A1C)  - POCT microalbumin  - CBC with Auto Differential; Future  - Comprehensive Metabolic Panel; Future  - Lipid, Fasting; Future  - TSH With Reflex Ft4; Future  - TSH With Reflex Ft4  - Lipid, Fasting  - Comprehensive Metabolic Panel  - CBC with Auto Differential  - dulaglutide (TRULICITY) 1.5 MG/0.5ML SC injection; Inject 0.5 mLs into the skin once a week  Dispense: 2 mL; Refill: 0    3. Essential hypertension  Uncontrolled.  Potential for pregnancy so avoiding ACE/ARB. Will check labs. Add hydrochlorothiazide. Re-check in 1 month. Bring log of home blood pressures.    - amLODIPine (NORVASC) 10 MG tablet; Take 1 tablet by mouth daily  Dispense: 90 tablet; Refill: 0  - CBC with Auto Differential; Future  - Comprehensive Metabolic Panel; Future  - Lipid, Fasting; Future  - TSH With Reflex Ft4; Future  - TSH With Reflex Ft4  - Lipid, Fasting  - Comprehensive Metabolic Panel  - CBC with Auto Differential    4. Neck pain  Mild. Given duration will check x-ray.  Refilled effective regimen. No red flags.    - orphenadrine (NORFLEX) 100 MG extended release tablet; Take 1 tablet by mouth 2 times daily as needed for Muscle spasms  Dispense: 20

## 2024-01-05 LAB
BASOPHILS ABSOLUTE: 0.1 THOU/MM3 (ref 0–0.1)
BASOPHILS NFR BLD AUTO: 0.7 %
DEPRECATED RDW RBC AUTO: 40.5 FL (ref 35–45)
EOSINOPHIL NFR BLD AUTO: 2 %
EOSINOPHILS ABSOLUTE: 0.2 THOU/MM3 (ref 0–0.4)
ERYTHROCYTE [DISTWIDTH] IN BLOOD BY AUTOMATED COUNT: 12.9 % (ref 11.5–14.5)
HCT VFR BLD AUTO: 46.4 % (ref 37–47)
HCV IGG SERPL QL IA: NEGATIVE
HGB BLD-MCNC: 16.9 GM/DL (ref 12–16)
IMM GRANULOCYTES # BLD AUTO: 0.02 THOU/MM3 (ref 0–0.07)
IMM GRANULOCYTES NFR BLD AUTO: 0.2 %
LYMPHOCYTES ABSOLUTE: 3.4 THOU/MM3 (ref 1–4.8)
LYMPHOCYTES NFR BLD AUTO: 34.8 %
MCH RBC QN AUTO: 31.7 PG (ref 26–33)
MCHC RBC AUTO-ENTMCNC: 36.4 GM/DL (ref 32.2–35.5)
MCV RBC AUTO: 87.1 FL (ref 81–99)
MONOCYTES ABSOLUTE: 0.5 THOU/MM3 (ref 0.4–1.3)
MONOCYTES NFR BLD AUTO: 4.8 %
NEUTROPHILS NFR BLD AUTO: 57.5 %
NRBC BLD AUTO-RTO: 0 /100 WBC
PLATELET # BLD AUTO: 408 THOU/MM3 (ref 130–400)
PMV BLD AUTO: 10.3 FL (ref 9.4–12.4)
RBC # BLD AUTO: 5.33 MILL/MM3 (ref 4.2–5.4)
SEGMENTED NEUTROPHILS ABSOLUTE COUNT: 5.6 THOU/MM3 (ref 1.8–7.7)
WBC # BLD AUTO: 9.7 THOU/MM3 (ref 4.8–10.8)

## 2024-02-16 ENCOUNTER — HOSPITAL ENCOUNTER (OUTPATIENT)
Dept: WOMENS IMAGING | Age: 39
Discharge: HOME | End: 2024-02-16
Attending: FAMILY MEDICINE
Payer: COMMERCIAL

## 2024-02-16 DIAGNOSIS — N63.22 MASS OF UPPER INNER QUADRANT OF LEFT BREAST: ICD-10-CM

## 2024-02-16 PROCEDURE — 76642 ULTRASOUND BREAST LIMITED: CPT

## 2024-02-16 PROCEDURE — G0279 TOMOSYNTHESIS, MAMMO: HCPCS

## 2024-03-11 ENCOUNTER — OFFICE VISIT (OUTPATIENT)
Dept: FAMILY MEDICINE CLINIC | Age: 39
End: 2024-03-11
Payer: COMMERCIAL

## 2024-03-11 VITALS
OXYGEN SATURATION: 98 % | HEIGHT: 64 IN | RESPIRATION RATE: 16 BRPM | HEART RATE: 74 BPM | DIASTOLIC BLOOD PRESSURE: 94 MMHG | WEIGHT: 293 LBS | SYSTOLIC BLOOD PRESSURE: 160 MMHG | BODY MASS INDEX: 50.02 KG/M2

## 2024-03-11 DIAGNOSIS — N91.5 LIGHT MENSTRUAL FLOW: Primary | ICD-10-CM

## 2024-03-11 DIAGNOSIS — Z23 NEED FOR HEPATITIS B VACCINATION: ICD-10-CM

## 2024-03-11 DIAGNOSIS — E11.65 TYPE 2 DIABETES MELLITUS WITH HYPERGLYCEMIA, WITHOUT LONG-TERM CURRENT USE OF INSULIN (HCC): ICD-10-CM

## 2024-03-11 DIAGNOSIS — I10 ESSENTIAL HYPERTENSION: ICD-10-CM

## 2024-03-11 LAB
CONTROL: NORMAL
PREGNANCY TEST URINE, POC: NEGATIVE

## 2024-03-11 PROCEDURE — 3052F HG A1C>EQUAL 8.0%<EQUAL 9.0%: CPT | Performed by: FAMILY MEDICINE

## 2024-03-11 PROCEDURE — 90471 IMMUNIZATION ADMIN: CPT | Performed by: FAMILY MEDICINE

## 2024-03-11 PROCEDURE — 99214 OFFICE O/P EST MOD 30 MIN: CPT | Performed by: FAMILY MEDICINE

## 2024-03-11 PROCEDURE — 3077F SYST BP >= 140 MM HG: CPT | Performed by: FAMILY MEDICINE

## 2024-03-11 PROCEDURE — 1036F TOBACCO NON-USER: CPT | Performed by: FAMILY MEDICINE

## 2024-03-11 PROCEDURE — 2022F DILAT RTA XM EVC RTNOPTHY: CPT | Performed by: FAMILY MEDICINE

## 2024-03-11 PROCEDURE — G8417 CALC BMI ABV UP PARAM F/U: HCPCS | Performed by: FAMILY MEDICINE

## 2024-03-11 PROCEDURE — 90746 HEPB VACCINE 3 DOSE ADULT IM: CPT | Performed by: FAMILY MEDICINE

## 2024-03-11 PROCEDURE — 81025 URINE PREGNANCY TEST: CPT | Performed by: FAMILY MEDICINE

## 2024-03-11 PROCEDURE — 3080F DIAST BP >= 90 MM HG: CPT | Performed by: FAMILY MEDICINE

## 2024-03-11 PROCEDURE — G8427 DOCREV CUR MEDS BY ELIG CLIN: HCPCS | Performed by: FAMILY MEDICINE

## 2024-03-11 PROCEDURE — G8484 FLU IMMUNIZE NO ADMIN: HCPCS | Performed by: FAMILY MEDICINE

## 2024-03-11 RX ORDER — DULAGLUTIDE 3 MG/.5ML
3 INJECTION, SOLUTION SUBCUTANEOUS WEEKLY
Qty: 6 ML | Refills: 1 | Status: SHIPPED | OUTPATIENT
Start: 2024-03-11

## 2024-03-11 RX ORDER — AMLODIPINE BESYLATE 10 MG/1
10 TABLET ORAL DAILY
Qty: 90 TABLET | Refills: 1 | Status: SHIPPED | OUTPATIENT
Start: 2024-03-11

## 2024-03-11 SDOH — ECONOMIC STABILITY: INCOME INSECURITY: HOW HARD IS IT FOR YOU TO PAY FOR THE VERY BASICS LIKE FOOD, HOUSING, MEDICAL CARE, AND HEATING?: NOT HARD AT ALL

## 2024-03-11 SDOH — ECONOMIC STABILITY: FOOD INSECURITY: WITHIN THE PAST 12 MONTHS, THE FOOD YOU BOUGHT JUST DIDN'T LAST AND YOU DIDN'T HAVE MONEY TO GET MORE.: NEVER TRUE

## 2024-03-11 SDOH — ECONOMIC STABILITY: FOOD INSECURITY: WITHIN THE PAST 12 MONTHS, YOU WORRIED THAT YOUR FOOD WOULD RUN OUT BEFORE YOU GOT MONEY TO BUY MORE.: NEVER TRUE

## 2024-03-11 ASSESSMENT — ENCOUNTER SYMPTOMS
ABDOMINAL PAIN: 0
SHORTNESS OF BREATH: 0
COUGH: 0

## 2024-03-11 NOTE — PROGRESS NOTES
[facility]ESSIONAL [facility]  [facility]  [address] [facility] [address]  Dept: [phone]  Dept Fax: [phone]  Loc: [phone]      Jing Rousseau is a 38 y.o. female who presents todayfor her medical conditions/complaints as noted below.  Jing Rousseau is c/o of Follow-up (When taking hydrochlorothiazide gets headaches os has not been taking it)      :     HPI    Weight reported better with Trulicity.    Off hydrochlorothiazide with headaches.  130's over 80's at home.      Last menses was really light.      Reports felt poorly on beta blocker also.    Family history of white coat hypertension.      Wants repeat breast exam today to confirm nothing palpable.  She does not feel any lump.    Patient Active Problem List   Diagnosis    Impaired fasting glucose    Morbid (severe) obesity due to excess calories (HCC)    Essential hypertension    Type 2 diabetes mellitus with hyperglycemia      Goals        Exercise 3x per week (30 min per time)           The patient has No Known Allergies.    Medical History  Jing has a past medical history of BRCA1 negative, BRCA2 negative, Gallstone, and Hypertension.    Past SurgicalHistory  The patient  has a past surgical history that includes  section ().    Family History  This patient's family history includes Breast Cancer (age of onset: 53) in her mother; Diabetes in her mother; Kidney Disease in her father.    Social History  Jing  reports that she has never smoked. She has never been exposed to tobacco smoke. She has never used smokeless tobacco. She reports that she does not drink alcohol and does not use drugs.    Medications    Current Outpatient Medications:     amLODIPine (NORVASC) 10 MG tablet, Take 1 tablet by mouth daily, Disp: 90 tablet, Rfl: 1    Dulaglutide (TRULICITY) 3 MG/0.5ML SOPN, Inject 3 mg into the skin once a week, Disp: 6 mL, Rfl: 1    meclizine (ANTIVERT) 25 MG tablet,

## 2024-03-11 NOTE — PROGRESS NOTES
After obtaining consent, and per orders of Dr. Beltre, injection of Hep B  given in Left deltoid by MARILIA FERNÁNDEZ MA. Patient instructed to remain in clinic for 20 minutes afterwards, and to report any adverse reaction to me immediately.    Immunizations Administered       Name Date Dose Route    Hep B, ENGERIX-B, (age 20y+), IM, 1mL 3/11/2024 1 mL Intramuscular    Site: Deltoid- Left    Lot: [id number]    NDC: 49897-631-09          Patient tolerated injection well

## 2024-05-10 DIAGNOSIS — E11.65 TYPE 2 DIABETES MELLITUS WITH HYPERGLYCEMIA, WITHOUT LONG-TERM CURRENT USE OF INSULIN (HCC): ICD-10-CM

## 2024-05-10 RX ORDER — DULAGLUTIDE 4.5 MG/.5ML
4.5 INJECTION, SOLUTION SUBCUTANEOUS WEEKLY
Qty: 6 ML | Refills: 1 | Status: SHIPPED | OUTPATIENT
Start: 2024-05-10

## 2024-05-20 ENCOUNTER — TELEPHONE (OUTPATIENT)
Dept: FAMILY MEDICINE CLINIC | Age: 39
End: 2024-05-20

## 2024-05-20 NOTE — TELEPHONE ENCOUNTER
[facility] needing Dx code- Type 2 dx given

## 2024-05-23 ENCOUNTER — TELEPHONE (OUTPATIENT)
Dept: FAMILY MEDICINE CLINIC | Age: 39
End: 2024-05-23

## 2024-05-28 NOTE — TELEPHONE ENCOUNTER
Let me know status on this. Patient messaged met that cost was $900 but I am guessing that was without prior authorization?  I can sent in a script for another GLP-1 that is covered if needed. Thanks.

## 2024-05-29 NOTE — TELEPHONE ENCOUNTER
Request Reference Number: [id number]. OZEMPIC INJ 8MG/3ML is approved through 05/23/2025. Your patient may now fill this prescription and it will be covered.     [facility] pharmacy updated- they are stating that the cost is still $901.00 even with insurance-     Please advise

## 2024-05-30 NOTE — TELEPHONE ENCOUNTER
Please let patient know to sign up for a copay card from the .  It looks like this can be done at [email or web address].  This might help with cost.  [facility] also please check with pharmacy and let me know if there are less expensive covered GLP-1 options. Thanks.

## 2024-05-30 NOTE — TELEPHONE ENCOUNTER
Kati Gregory Joanna, MD1 hour ago (7:54 AM)     AM  She has commercial insurance so she will not qualify for any of the PAP programs, she can only use copay savings cards from the . She might have a large deductible through her insurance.

## 2024-05-30 NOTE — TELEPHONE ENCOUNTER
Does pharmacy have information on less expensive covered options?  Will forward to Kati Gregory also. Thanks.

## 2024-06-25 ENCOUNTER — TELEPHONE (OUTPATIENT)
Dept: FAMILY MEDICINE CLINIC | Age: 39
End: 2024-06-25

## 2024-06-25 DIAGNOSIS — E66.01 MORBID OBESITY (HCC): ICD-10-CM

## 2024-06-25 DIAGNOSIS — E11.65 TYPE 2 DIABETES MELLITUS WITH HYPERGLYCEMIA, WITHOUT LONG-TERM CURRENT USE OF INSULIN (HCC): Primary | ICD-10-CM

## 2024-06-25 NOTE — TELEPHONE ENCOUNTER
Yes ozempic is the diabetes approved version of this medication but it looks like that was approved in the past but cost was too high. Patient can call insurance company and see what medications would be covered for type two diabetes (GLP-1s) and how much it would be out of pocket.

## 2024-06-25 NOTE — TELEPHONE ENCOUNTER
Patient spoke with  and would like wegovy sent into her pharmacy-     Do you want to see her?    Patient's last appointment was : 3/11/2024  Patient's next appointment is : Visit date not found

## 2024-06-25 NOTE — TELEPHONE ENCOUNTER
Sent in the 0.25 dose of wegovy. Would recommend being seen in 4 weeks for next increase in dose.

## 2024-06-28 NOTE — TELEPHONE ENCOUNTER
Patient notified that Wegovy was denied. She will call her insurance for another option and then call our office back.

## 2024-07-18 ENCOUNTER — TELEPHONE (OUTPATIENT)
Dept: FAMILY MEDICINE CLINIC | Age: 39
End: 2024-07-18
Payer: COMMERCIAL

## 2024-07-18 DIAGNOSIS — R30.0 DYSURIA: Primary | ICD-10-CM

## 2024-07-18 PROCEDURE — 81003 URINALYSIS AUTO W/O SCOPE: CPT | Performed by: FAMILY MEDICINE

## 2024-07-18 NOTE — TELEPHONE ENCOUNTER
Chatted with patient.  Mild dysuria improving with hydration and cranberry juice. No other symptoms. No fevers or flank pain.  Will place order for UA.  If symptoms persist will get sample tomorrow and would treat with antibiotics. If symptoms have largely resolved, would await culture results. Amenable with plan.

## 2024-07-19 ENCOUNTER — HOSPITAL ENCOUNTER (OUTPATIENT)
Age: 39
Discharge: HOME | End: 2024-07-19
Payer: COMMERCIAL

## 2024-07-19 DIAGNOSIS — R30.0 DYSURIA: ICD-10-CM

## 2024-07-19 LAB
BACTERIA: ABNORMAL
BILIRUB UR QL STRIP: NEGATIVE
CASTS #/AREA URNS LPF: ABNORMAL /LPF
CASTS #/AREA URNS LPF: ABNORMAL /LPF
CHARACTER UR: CLEAR
CHARCOAL URNS QL MICRO: ABNORMAL
COLOR UR: YELLOW
CRYSTALS URNS QL MICRO: ABNORMAL
EPITHELIAL CELLS, UA: ABNORMAL /HPF
GLUCOSE UR QL STRIP.AUTO: >= 1000 MG/DL
HGB UR QL STRIP.AUTO: ABNORMAL
KETONES UR QL STRIP.AUTO: NEGATIVE
LEUKOCYTE ESTERASE UR QL STRIP.AUTO: NEGATIVE
NITRITE UR QL STRIP.AUTO: NEGATIVE
PH UR STRIP.AUTO: 5.5 [PH] (ref 5–9)
PROT UR STRIP.AUTO-MCNC: NEGATIVE MG/DL
RBC #/AREA URNS HPF: ABNORMAL /HPF
RENAL EPI CELLS #/AREA URNS HPF: ABNORMAL /[HPF]
SPECIFIC GRAVITY UA: > 1.03 (ref 1–1.03)
UROBILINOGEN, URINE: 0.2 EU/DL (ref 0–1)
WBC #/AREA URNS HPF: ABNORMAL /HPF
YEAST LIKE FUNGI URNS QL MICRO: ABNORMAL

## 2024-07-19 PROCEDURE — 87086 URINE CULTURE/COLONY COUNT: CPT

## 2024-07-19 PROCEDURE — 81001 URINALYSIS AUTO W/SCOPE: CPT

## 2024-07-19 RX ORDER — SULFAMETHOXAZOLE AND TRIMETHOPRIM 800; 160 MG/1; MG/1
1 TABLET ORAL 2 TIMES DAILY
Qty: 12 TABLET | Refills: 0 | Status: SHIPPED | OUTPATIENT
Start: 2024-07-19 | End: 2024-07-25

## 2024-07-19 RX ORDER — FLUCONAZOLE 150 MG/1
150 TABLET ORAL WEEKLY
Qty: 2 TABLET | Refills: 0 | Status: SHIPPED | OUTPATIENT
Start: 2024-07-19

## 2024-07-20 LAB
BACTERIA UR CULT: ABNORMAL
ORGANISM: ABNORMAL

## 2024-08-20 ENCOUNTER — OFFICE VISIT (OUTPATIENT)
Dept: FAMILY MEDICINE CLINIC | Age: 39
End: 2024-08-20
Payer: COMMERCIAL

## 2024-08-20 VITALS
HEIGHT: 64 IN | OXYGEN SATURATION: 96 % | SYSTOLIC BLOOD PRESSURE: 160 MMHG | HEART RATE: 76 BPM | WEIGHT: 293 LBS | RESPIRATION RATE: 16 BRPM | BODY MASS INDEX: 50.02 KG/M2 | TEMPERATURE: 98.1 F | DIASTOLIC BLOOD PRESSURE: 110 MMHG

## 2024-08-20 DIAGNOSIS — E11.65 TYPE 2 DIABETES MELLITUS WITH HYPERGLYCEMIA, WITHOUT LONG-TERM CURRENT USE OF INSULIN (HCC): ICD-10-CM

## 2024-08-20 DIAGNOSIS — R30.0 DYSURIA: Primary | ICD-10-CM

## 2024-08-20 DIAGNOSIS — B37.9 YEAST INFECTION: ICD-10-CM

## 2024-08-20 DIAGNOSIS — R42 DIZZINESS: ICD-10-CM

## 2024-08-20 LAB
BILIRUBIN, POC: NEGATIVE
BLOOD URINE, POC: ABNORMAL
CLARITY, POC: ABNORMAL
COLOR, POC: YELLOW
GLUCOSE URINE, POC: >1000
KETONES, POC: NEGATIVE
LEUKOCYTE EST, POC: NEGATIVE
NITRITE, POC: NEGATIVE
PH, POC: 6
PROTEIN, POC: NEGATIVE
SPECIFIC GRAVITY, POC: 1.01
UROBILINOGEN, POC: 0.2

## 2024-08-20 PROCEDURE — 3052F HG A1C>EQUAL 8.0%<EQUAL 9.0%: CPT | Performed by: STUDENT IN AN ORGANIZED HEALTH CARE EDUCATION/TRAINING PROGRAM

## 2024-08-20 PROCEDURE — 3080F DIAST BP >= 90 MM HG: CPT | Performed by: STUDENT IN AN ORGANIZED HEALTH CARE EDUCATION/TRAINING PROGRAM

## 2024-08-20 PROCEDURE — 81003 URINALYSIS AUTO W/O SCOPE: CPT | Performed by: STUDENT IN AN ORGANIZED HEALTH CARE EDUCATION/TRAINING PROGRAM

## 2024-08-20 PROCEDURE — 99214 OFFICE O/P EST MOD 30 MIN: CPT | Performed by: STUDENT IN AN ORGANIZED HEALTH CARE EDUCATION/TRAINING PROGRAM

## 2024-08-20 PROCEDURE — 3077F SYST BP >= 140 MM HG: CPT | Performed by: STUDENT IN AN ORGANIZED HEALTH CARE EDUCATION/TRAINING PROGRAM

## 2024-08-20 RX ORDER — DULAGLUTIDE 3 MG/.5ML
3 INJECTION, SOLUTION SUBCUTANEOUS WEEKLY
Qty: 6 ML | Refills: 1 | Status: SHIPPED | OUTPATIENT
Start: 2024-08-20

## 2024-08-20 RX ORDER — MECLIZINE HYDROCHLORIDE 25 MG/1
25 TABLET ORAL 3 TIMES DAILY PRN
Qty: 25 TABLET | Refills: 2 | Status: SHIPPED | OUTPATIENT
Start: 2024-08-20

## 2024-08-20 RX ORDER — FLUCONAZOLE 150 MG/1
TABLET ORAL
Qty: 2 TABLET | Refills: 0 | Status: SHIPPED | OUTPATIENT
Start: 2024-08-20

## 2024-08-20 NOTE — PROGRESS NOTES
Health Maintenance Due   Topic Date Due    Diabetic retinal exam  Never done    COVID-19 Vaccine (4 - 2023-24 season) 09/01/2023    Cervical cancer screen  05/18/2024    Flu vaccine (1) 08/01/2024

## 2024-08-20 NOTE — PROGRESS NOTES
[facility]  [facility]  [address] [address]  [phone]     Jing Rousseau is a 38 y.o. female who presents today for:  Chief Complaint   Patient presents with    Vaginal Itching     Vaginal itching and burning, sx started Sunday.        Assessment/Plan:     Jing was seen today for vaginal itching.    Diagnoses and all orders for this visit:    Dysuria  -     POCT Urinalysis No Micro (Auto)    Type 2 diabetes mellitus with hyperglycemia, without long-term current use of insulin (HCC)  -     Dulaglutide (TRULICITY) 3 MG/0.5ML SOPN; Inject 3 mg into the skin once a week    Yeast infection  -     fluconazole (DIFLUCAN) 150 MG tablet; torey andry tableta cada 72 horas    Dizziness  -     meclizine (ANTIVERT) 25 MG tablet; Take 1 tablet by mouth 3 times daily as needed for Dizziness    Other orders  -     miconazole (MICONAZOLE 7) 2 % vaginal cream; Place vaginally nightly.      Dysuria: Acute/uncontrolled, patient reports more itchiness and urinary frequency.  UA not concerning for infection however was concerning for glucosuria. Has not been taking trulicity for at least 30 days, pharmacy can't get it in.     Dm2: Chronic/controlled, however patient not currently taking Trulicity, try ordering it for different pharmacy.    Yeast infection: Acute/uncontrolled, on physical exam patient has concerning findings for yeast infection, will prescribe Diflucan as above, can also try miconazole cream to help with itch.    Dizziness: Chronic/controlled, refilled meclizine as above.           No follow-ups on file.      Medications Prescribed:  Orders Placed This Encounter   Medications    meclizine (ANTIVERT) 25 MG tablet     Sig: Take 1 tablet by mouth 3 times daily as needed for Dizziness     Dispense:  25 tablet     Refill:  2    fluconazole (DIFLUCAN) 150 MG tablet     Sig: torey andry tableta cada 72 horas     Dispense:  2 tablet     Refill:

## 2024-08-21 ASSESSMENT — ENCOUNTER SYMPTOMS
NAUSEA: 0
DIARRHEA: 0
VOMITING: 0
CONSTIPATION: 0

## 2024-08-22 ENCOUNTER — TELEPHONE (OUTPATIENT)
Dept: FAMILY MEDICINE CLINIC | Age: 39
End: 2024-08-22

## 2024-08-23 NOTE — TELEPHONE ENCOUNTER
Payer: [employer] - Commercial Case ID: [id number]  Note from payer: Request Reference Number: [id number]. TRULICITY INJ 3/0.5 is approved through 08/22/2025. Your patient may now fill this prescription and it will be covered.  Approval Details    Authorized from August 22, 2024 to August 22, 2025  Electronic appeal: Not supported

## 2024-08-29 ENCOUNTER — OFFICE VISIT (OUTPATIENT)
Dept: FAMILY MEDICINE CLINIC | Age: 39
End: 2024-08-29

## 2024-08-29 VITALS
SYSTOLIC BLOOD PRESSURE: 166 MMHG | WEIGHT: 293 LBS | BODY MASS INDEX: 51.81 KG/M2 | HEART RATE: 94 BPM | RESPIRATION RATE: 16 BRPM | OXYGEN SATURATION: 98 % | DIASTOLIC BLOOD PRESSURE: 90 MMHG

## 2024-08-29 DIAGNOSIS — B37.31 VAGINAL YEAST INFECTION: ICD-10-CM

## 2024-08-29 DIAGNOSIS — R30.0 DYSURIA: ICD-10-CM

## 2024-08-29 DIAGNOSIS — E66.01 CLASS 3 SEVERE OBESITY WITH BODY MASS INDEX (BMI) OF 50.0 TO 59.9 IN ADULT, UNSPECIFIED OBESITY TYPE, UNSPECIFIED WHETHER SERIOUS COMORBIDITY PRESENT (HCC): ICD-10-CM

## 2024-08-29 DIAGNOSIS — I10 ESSENTIAL HYPERTENSION: Primary | ICD-10-CM

## 2024-08-29 DIAGNOSIS — E11.65 TYPE 2 DIABETES MELLITUS WITH HYPERGLYCEMIA, WITHOUT LONG-TERM CURRENT USE OF INSULIN (HCC): ICD-10-CM

## 2024-08-29 DIAGNOSIS — Z01.419 WELL WOMAN EXAM WITH ROUTINE GYNECOLOGICAL EXAM: ICD-10-CM

## 2024-08-29 LAB
BILIRUBIN, POC: NEGATIVE
BLOOD URINE, POC: NEGATIVE
CLARITY, POC: CLEAR
COLOR, POC: YELLOW
GLUCOSE URINE, POC: 500
HBA1C MFR BLD: 10.7 %
KETONES, POC: NEGATIVE
LEUKOCYTE EST, POC: NEGATIVE
NITRITE, POC: NEGATIVE
PH, POC: 5.5
PROTEIN, POC: NEGATIVE
SPECIFIC GRAVITY, POC: 1.01
UROBILINOGEN, POC: 0.2

## 2024-08-29 RX ORDER — METFORMIN HCL 500 MG
500 TABLET, EXTENDED RELEASE 24 HR ORAL
Qty: 30 TABLET | Refills: 1 | Status: SHIPPED | OUTPATIENT
Start: 2024-08-29

## 2024-08-29 RX ORDER — NEBULIZER AND COMPRESSOR
1 EACH MISCELLANEOUS DAILY
Qty: 1 KIT | Refills: 0 | Status: SHIPPED | OUTPATIENT
Start: 2024-08-29

## 2024-08-29 RX ORDER — TIRZEPATIDE 2.5 MG/.5ML
2.5 INJECTION, SOLUTION SUBCUTANEOUS WEEKLY
Qty: 2 ML | Refills: 1 | Status: SHIPPED | OUTPATIENT
Start: 2024-08-29

## 2024-08-29 RX ORDER — DAPAGLIFLOZIN 5 MG/1
5 TABLET, FILM COATED ORAL EVERY MORNING
Qty: 30 TABLET | Refills: 1 | Status: SHIPPED | OUTPATIENT
Start: 2024-08-29

## 2024-08-29 RX ORDER — AMLODIPINE BESYLATE 10 MG/1
10 TABLET ORAL DAILY
Qty: 90 TABLET | Refills: 1 | Status: SHIPPED | OUTPATIENT
Start: 2024-08-29

## 2024-08-29 RX ORDER — LOSARTAN POTASSIUM 25 MG/1
25 TABLET ORAL DAILY
Qty: 30 TABLET | Refills: 1 | Status: SHIPPED | OUTPATIENT
Start: 2024-08-29

## 2024-08-29 ASSESSMENT — ENCOUNTER SYMPTOMS
COUGH: 0
ABDOMINAL PAIN: 0
CHEST TIGHTNESS: 0
NAUSEA: 0
SINUS PRESSURE: 0
DIARRHEA: 0
CONSTIPATION: 0
SHORTNESS OF BREATH: 0

## 2024-08-29 NOTE — PROGRESS NOTES
Attending attestation:  I personally performed and participated key or critical portions of the evaluation and management including personally performing the exam and medical decision making.  I verify the accuracy of the documentation by the resident with the following addition or changes: Here for follow-up and well woman with pap. Blood pressure uncontrolled. Taking max dose of amlodipine.  Occasional blurred vision without headaches. Agreeable to starting a second medication.  Thinks vague stomach upset with lisinopril. Had issue with beta blockers also - felt poorly (was on propranolol). Had issues with diuretics (hydrochlorothiazide) -  headaches.  Will add losartan.  Needs pap today.  Diabetes is also uncontrolled. A1C is 10.7 up from 8.5.  Did well on Trulicity and was losing weight but not able to get script.  Was also supposed to be on metformin.  Will re-add triple therapy today.  Thinks had yeast infection. Wants testing for this.  Will send swabs.  Encourage eye exam; suspect high sugars causing intermittent blurred vision. Re-check in 1 month.        Electronically signed by Zuleyka Beltre MD on 8/29/2024 at 1:10 PM

## 2024-08-29 NOTE — PROGRESS NOTES
history of BRCA1 negative, BRCA2 negative, Gallstone, Hypertension, and Type 2 diabetes mellitus without complication (HCC).    Past SurgicalHistory  The patient  has a past surgical history that includes  section (2015).    Family History  This patient's family history includes Breast Cancer (age of onset: 53) in her mother; Diabetes in her mother; Kidney Disease in her father.    Social History  Jing  reports that she has never smoked. She has never been exposed to tobacco smoke. She has never used smokeless tobacco. She reports that she does not drink alcohol and does not use drugs.    Medications    Current Outpatient Medications:     Magnesium Hydroxide (MAGNESIA PO), Take by mouth, Disp: , Rfl:     amLODIPine (NORVASC) 10 MG tablet, Take 1 tablet by mouth daily, Disp: 90 tablet, Rfl: 1    Tirzepatide (MOUNJARO) 2.5 MG/0.5ML SOPN SC injection, Inject 0.5 mLs into the skin once a week, Disp: 2 mL, Rfl: 1    losartan (COZAAR) 25 MG tablet, Take 1 tablet by mouth daily, Disp: 30 tablet, Rfl: 1    metFORMIN (GLUCOPHAGE-XR) 500 MG extended release tablet, Take 1 tablet by mouth daily (with breakfast), Disp: 30 tablet, Rfl: 1    dapagliflozin (FARXIGA) 5 MG tablet, Take 1 tablet by mouth every morning, Disp: 30 tablet, Rfl: 1    Blood Pressure Monitoring (ADULT BLOOD PRESSURE CUFF LG) KIT, 1 each by Does not apply route daily, Disp: 1 kit, Rfl: 0    meclizine (ANTIVERT) 25 MG tablet, Take 1 tablet by mouth 3 times daily as needed for Dizziness, Disp: 25 tablet, Rfl: 2    omeprazole (PRILOSEC) 20 MG delayed release capsule, Take 2 capsules by mouth every morning (before breakfast) Take 2 daily for 14 days, then go back to 20 mg daily. (Patient taking differently: Take 1 capsule by mouth every morning (before breakfast)), Disp: 90 capsule, Rfl: 1    NONFORMULARY, Stress Calm - for anxiety, Disp: , Rfl:     Dulaglutide (TRULICITY) 3 MG/0.5ML SOPN, Inject 3 mg into the skin once a week (Patient not taking:  N/A    POCT glycosylated hemoglobin (Hb A1C)    POCT Urinalysis No Micro (Auto)       Prescriptions given/sent  Orders Placed This Encounter   Medications    amLODIPine (NORVASC) 10 MG tablet     Sig: Take 1 tablet by mouth daily     Dispense:  90 tablet     Refill:  1    Tirzepatide (MOUNJARO) 2.5 MG/0.5ML SOPN SC injection     Sig: Inject 0.5 mLs into the skin once a week     Dispense:  2 mL     Refill:  1    losartan (COZAAR) 25 MG tablet     Sig: Take 1 tablet by mouth daily     Dispense:  30 tablet     Refill:  1    metFORMIN (GLUCOPHAGE-XR) 500 MG extended release tablet     Sig: Take 1 tablet by mouth daily (with breakfast)     Dispense:  30 tablet     Refill:  1    dapagliflozin (FARXIGA) 5 MG tablet     Sig: Take 1 tablet by mouth every morning     Dispense:  30 tablet     Refill:  1    Blood Pressure Monitoring (ADULT BLOOD PRESSURE CUFF LG) KIT     Si each by Does not apply route daily     Dispense:  1 kit     Refill:  0       Patient given educational materials - see patient instructions.  Discussed use, benefit, and side effects of recommended medications.  All patient questions answered.  Pt voiced understanding.  Reviewed health maintenance.            On this date 2024 I have spent 45-50 minutes reviewing previous notes, test results and face to face with the patient discussing the diagnosis and importance of compliance with the treatment plan as well as documenting on the day of the visit.  Electronically signed by Emmanuelle Lacy DO on 2024 at 1:45 PM

## 2024-08-30 LAB
CANDIDA SPECIES, DNA PROBE: NEGATIVE
GARDNERELLA VAGINALIS, DNA PROBE: NEGATIVE
SOURCE: NORMAL
SPECIMEN SOURCE: NORMAL
TRICHOMONAS VAGINALIS DNA: NEGATIVE

## 2024-09-03 ENCOUNTER — TELEPHONE (OUTPATIENT)
Dept: FAMILY MEDICINE CLINIC | Age: 39
End: 2024-09-03

## 2024-09-03 LAB
C TRACH DNA SPEC QL NAA+PROBE: NEGATIVE
HEXOKINASE1 CFR RBC: NEGATIVE %
SPECIMEN SOURCE: NORMAL

## 2024-09-03 NOTE — TELEPHONE ENCOUNTER
Medication approved- scanned into media     [employer] notified - they will process and notify patient

## 2024-09-03 NOTE — TELEPHONE ENCOUNTER
Patient calling regarding PA for mounjaro    PA started through covermymeds     Once PA please notify patient and pharmacy     Thank you

## 2024-09-04 ENCOUNTER — PATIENT MESSAGE (OUTPATIENT)
Dept: FAMILY MEDICINE CLINIC | Age: 39
End: 2024-09-04

## 2024-09-04 ENCOUNTER — TELEPHONE (OUTPATIENT)
Dept: FAMILY MEDICINE CLINIC | Age: 39
End: 2024-09-04

## 2024-09-04 NOTE — TELEPHONE ENCOUNTER
Only thing she can use is a copay savings card from the . PAP programs are only for uninsured patients and Medicare D patients.

## 2024-09-09 DIAGNOSIS — E11.65 TYPE 2 DIABETES MELLITUS WITH HYPERGLYCEMIA, WITHOUT LONG-TERM CURRENT USE OF INSULIN (HCC): ICD-10-CM

## 2024-09-09 DIAGNOSIS — Z01.00 ENCOUNTER FOR VISION SCREENING: Primary | ICD-10-CM

## 2024-09-09 LAB — CYTOLOGY THIN PREP PAP: NORMAL

## 2024-09-25 DIAGNOSIS — E11.65 TYPE 2 DIABETES MELLITUS WITH HYPERGLYCEMIA, WITHOUT LONG-TERM CURRENT USE OF INSULIN (HCC): ICD-10-CM

## 2024-09-25 RX ORDER — TIRZEPATIDE 2.5 MG/.5ML
2.5 INJECTION, SOLUTION SUBCUTANEOUS WEEKLY
Qty: 2 ML | Refills: 1 | Status: CANCELLED | OUTPATIENT
Start: 2024-09-25

## 2024-09-30 ENCOUNTER — APPOINTMENT (OUTPATIENT)
Dept: CT IMAGING | Age: 39
End: 2024-09-30
Payer: COMMERCIAL

## 2024-09-30 ENCOUNTER — HOSPITAL ENCOUNTER (EMERGENCY)
Age: 39
Discharge: HOME | End: 2024-09-30
Attending: EMERGENCY MEDICINE
Payer: COMMERCIAL

## 2024-09-30 VITALS
BODY MASS INDEX: 50.02 KG/M2 | WEIGHT: 293 LBS | HEART RATE: 75 BPM | TEMPERATURE: 97.7 F | DIASTOLIC BLOOD PRESSURE: 83 MMHG | SYSTOLIC BLOOD PRESSURE: 146 MMHG | HEIGHT: 64 IN | OXYGEN SATURATION: 99 % | RESPIRATION RATE: 22 BRPM

## 2024-09-30 DIAGNOSIS — M54.2 NECK PAIN: ICD-10-CM

## 2024-09-30 DIAGNOSIS — R51.9 ACUTE NONINTRACTABLE HEADACHE, UNSPECIFIED HEADACHE TYPE: Primary | ICD-10-CM

## 2024-09-30 LAB
ALBUMIN SERPL BCG-MCNC: 4.4 G/DL (ref 3.5–5.1)
ALP SERPL-CCNC: 75 U/L (ref 38–126)
ALT SERPL W/O P-5'-P-CCNC: 35 U/L (ref 11–66)
ANION GAP SERPL CALC-SCNC: 13 MEQ/L (ref 8–16)
AST SERPL-CCNC: 22 U/L (ref 5–40)
B-HCG SERPL QL: NEGATIVE
BASOPHILS ABSOLUTE: 0.1 THOU/MM3 (ref 0–0.1)
BASOPHILS NFR BLD AUTO: 0.7 %
BILIRUB SERPL-MCNC: 0.3 MG/DL (ref 0.3–1.2)
BILIRUB UR QL STRIP.AUTO: NEGATIVE
BUN SERPL-MCNC: 15 MG/DL (ref 7–22)
CALCIUM SERPL-MCNC: 9.5 MG/DL (ref 8.5–10.5)
CHARACTER UR: CLEAR
CHLORIDE SERPL-SCNC: 99 MEQ/L (ref 98–111)
CO2 SERPL-SCNC: 25 MEQ/L (ref 23–33)
COLOR, UA: YELLOW
CREAT SERPL-MCNC: 0.6 MG/DL (ref 0.4–1.2)
DEPRECATED RDW RBC AUTO: 41.6 FL (ref 35–45)
EOSINOPHIL NFR BLD AUTO: 3.3 %
EOSINOPHILS ABSOLUTE: 0.4 THOU/MM3 (ref 0–0.4)
ERYTHROCYTE [DISTWIDTH] IN BLOOD BY AUTOMATED COUNT: 13.1 % (ref 11.5–14.5)
GFR SERPL CREATININE-BSD FRML MDRD: > 90 ML/MIN/1.73M2
GLUCOSE SERPL-MCNC: 123 MG/DL (ref 70–108)
GLUCOSE UR QL STRIP.AUTO: NEGATIVE MG/DL
HCT VFR BLD AUTO: 43.6 % (ref 37–47)
HGB BLD-MCNC: 14.4 GM/DL (ref 12–16)
HGB UR QL STRIP.AUTO: NEGATIVE
IMM GRANULOCYTES # BLD AUTO: 0.02 THOU/MM3 (ref 0–0.07)
IMM GRANULOCYTES NFR BLD AUTO: 0.2 %
KETONES UR QL STRIP.AUTO: NEGATIVE
LYMPHOCYTES ABSOLUTE: 4 THOU/MM3 (ref 1–4.8)
LYMPHOCYTES NFR BLD AUTO: 35.1 %
MCH RBC QN AUTO: 29 PG (ref 26–33)
MCHC RBC AUTO-ENTMCNC: 33 GM/DL (ref 32.2–35.5)
MCV RBC AUTO: 87.7 FL (ref 81–99)
MONOCYTES ABSOLUTE: 0.6 THOU/MM3 (ref 0.4–1.3)
MONOCYTES NFR BLD AUTO: 5 %
NEUTROPHILS ABSOLUTE: 6.4 THOU/MM3 (ref 1.8–7.7)
NEUTROPHILS NFR BLD AUTO: 55.7 %
NITRITE UR QL STRIP: NEGATIVE
NRBC BLD AUTO-RTO: 0 /100 WBC
OSMOLALITY SERPL CALC.SUM OF ELEC: 276 MOSMOL/KG (ref 275–300)
PH UR STRIP.AUTO: 6.5 [PH] (ref 5–9)
PLATELET # BLD AUTO: 315 THOU/MM3 (ref 130–400)
PMV BLD AUTO: 10.1 FL (ref 9.4–12.4)
POTASSIUM SERPL-SCNC: 4 MEQ/L (ref 3.5–5.2)
PROT SERPL-MCNC: 7.8 G/DL (ref 6.1–8)
PROT UR STRIP.AUTO-MCNC: NEGATIVE MG/DL
RBC # BLD AUTO: 4.97 MILL/MM3 (ref 4.2–5.4)
SODIUM SERPL-SCNC: 137 MEQ/L (ref 135–145)
SP GR UR REFRACT.AUTO: 1.01 (ref 1–1.03)
UROBILINOGEN, URINE: 0.2 EU/DL (ref 0–1)
WBC # BLD AUTO: 11.5 THOU/MM3 (ref 4.8–10.8)
WBC #/AREA URNS HPF: NEGATIVE /[HPF]

## 2024-09-30 PROCEDURE — 6370000000 HC RX 637 (ALT 250 FOR IP)

## 2024-09-30 PROCEDURE — 96372 THER/PROPH/DIAG INJ SC/IM: CPT

## 2024-09-30 PROCEDURE — 81003 URINALYSIS AUTO W/O SCOPE: CPT

## 2024-09-30 PROCEDURE — 85025 COMPLETE CBC W/AUTO DIFF WBC: CPT

## 2024-09-30 PROCEDURE — 6360000002 HC RX W HCPCS

## 2024-09-30 PROCEDURE — 70450 CT HEAD/BRAIN W/O DYE: CPT

## 2024-09-30 PROCEDURE — 36415 COLL VENOUS BLD VENIPUNCTURE: CPT

## 2024-09-30 PROCEDURE — 99284 EMERGENCY DEPT VISIT MOD MDM: CPT

## 2024-09-30 PROCEDURE — 72125 CT NECK SPINE W/O DYE: CPT

## 2024-09-30 PROCEDURE — 84703 CHORIONIC GONADOTROPIN ASSAY: CPT

## 2024-09-30 PROCEDURE — 80053 COMPREHEN METABOLIC PANEL: CPT

## 2024-09-30 RX ORDER — ORPHENADRINE CITRATE 30 MG/ML
60 INJECTION INTRAMUSCULAR; INTRAVENOUS ONCE
Status: COMPLETED | OUTPATIENT
Start: 2024-09-30 | End: 2024-09-30

## 2024-09-30 RX ORDER — ORPHENADRINE CITRATE 100 MG/1
100 TABLET, EXTENDED RELEASE ORAL 2 TIMES DAILY
Qty: 20 TABLET | Refills: 0 | Status: SHIPPED | OUTPATIENT
Start: 2024-09-30 | End: 2024-10-10

## 2024-09-30 RX ORDER — LIDOCAINE 4 G/G
1 PATCH TOPICAL ONCE
Status: DISCONTINUED | OUTPATIENT
Start: 2024-09-30 | End: 2024-10-01 | Stop reason: HOSPADM

## 2024-09-30 RX ORDER — KETOROLAC TROMETHAMINE 30 MG/ML
15 INJECTION, SOLUTION INTRAMUSCULAR; INTRAVENOUS ONCE
Status: COMPLETED | OUTPATIENT
Start: 2024-09-30 | End: 2024-09-30

## 2024-09-30 RX ADMIN — ORPHENADRINE CITRATE 60 MG: 60 INJECTION INTRAMUSCULAR; INTRAVENOUS at 21:15

## 2024-09-30 RX ADMIN — KETOROLAC TROMETHAMINE 15 MG: 30 INJECTION, SOLUTION INTRAMUSCULAR at 21:14

## 2024-09-30 ASSESSMENT — PAIN DESCRIPTION - LOCATION: LOCATION: NECK

## 2024-09-30 ASSESSMENT — PAIN SCALES - GENERAL
PAINLEVEL_OUTOF10: 5
PAINLEVEL_OUTOF10: 5

## 2024-09-30 ASSESSMENT — ENCOUNTER SYMPTOMS: SHORTNESS OF BREATH: 1

## 2024-09-30 ASSESSMENT — PAIN - FUNCTIONAL ASSESSMENT: PAIN_FUNCTIONAL_ASSESSMENT: 0-10

## 2024-09-30 NOTE — ED TRIAGE NOTES
PT to the ED with neck pain that goes into her head. PT states pain is a tingling sensation that comes and goes. PT denies any known injury. Pt states she goes to a chiropractor regularly that states \"its cervical\". PT alert oriented and ambulatory on arrival. Interpretor in use

## 2024-10-01 ENCOUNTER — TELEPHONE (OUTPATIENT)
Dept: FAMILY MEDICINE CLINIC | Age: 39
End: 2024-10-01

## 2024-10-01 NOTE — ED PROVIDER NOTES
Transfer of Care Note:   Physician Signing out: Satish Viera MD  Receiving Physician: Farzad Ruiz MD  Sign out time: 2230      Brief history:  38F with PMH significant for HTN, DMT2, morbid obesity (BMI 50) who presents to the ED for evaluation of neck pain and headache x1w.  Also reports experiencing worsening anxiety recently.  Vitals stable.  Physical exam reveals no acute focal deficits.    Items pending that need to be checked:  -CT head  -CT C-spine  -Reevaluation      Additional Assessment and results:   I have personally performed a face to face diagnostic evaluation on this patient. The patient's initial evaluation and plan have been discussed with the prior physician who initially evaluated the patient. Nursing Notes, Past Medical Hx, Past Surgical Hx, Social Hx, Allergies, vital signs and Family Hx were all reviewed.      Vitals:    09/30/24 2314   BP: (!) 146/83   Pulse: 75   Resp: 22   Temp:    SpO2: 99%     Physical Exam      Labs Reviewed   CBC WITH AUTO DIFFERENTIAL - Abnormal; Notable for the following components:       Result Value    WBC 11.5 (*)     All other components within normal limits   COMPREHENSIVE METABOLIC PANEL W/ REFLEX TO MG FOR LOW K - Abnormal; Notable for the following components:    Glucose 123 (*)     All other components within normal limits   URINALYSIS WITH REFLEX TO CULTURE   HCG, SERUM, QUALITATIVE   ANION GAP   GLOMERULAR FILTRATION RATE, ESTIMATED   OSMOLALITY         Medications   orphenadrine (NORFLEX) injection 60 mg (60 mg IntraMUSCular Given 9/30/24 2115)   ketorolac (TORADOL) injection 15 mg (15 mg IntraMUSCular Given 9/30/24 2114)         CT CSpine W/O Contrast   Final Result   No acute findings in the cervical spine.      This document has been electronically signed by: Boo Bee MD on    09/30/2024 09:57 PM      All CTs at this facility use dose modulation techniques and iterative    reconstructions, and/or weight-based dosing   when appropriate to 
Pulmonary effort is normal. No respiratory distress.      Breath sounds: Normal breath sounds. No wheezing, rhonchi or rales.   Abdominal:      Palpations: Abdomen is soft.   Musculoskeletal:      Cervical back: Rigidity present. Muscular tenderness present. No spinous process tenderness. Decreased range of motion.   Skin:     General: Skin is warm and dry.      Capillary Refill: Capillary refill takes less than 2 seconds.   Neurological:      Mental Status: She is alert and oriented to person, place, and time.      Sensory: No sensory deficit.      Motor: No weakness.   Psychiatric:         Mood and Affect: Mood normal.         Behavior: Behavior normal.         Thought Content: Thought content normal.         Judgment: Judgment normal.           DIFFERENTIALS   Initial Assessment: Given the patient's above chief complaint and findings on history and physical examination, I thought it was appropriate to consider the following emergency medical conditions: SAH, subdural hematoma, vertebral artery dissection, epidural, intracerebral, meningitis, encephalitis, migraine, tension, cluster, sinusitis, dental, stroke, encephalitis, abscess, CNS mass, increased ICP, venous thrombosis, carbon monoxide poisoning, acute angle closure glaucoma, temporal arteritis, idiopathic intracranial hypertension. Although some of these diagnoses are unlikely they were considered in my medical decision making.  Will get CT head WO rule out intracranial hemorrhage at this time. Norflex for muscle tightness.  CT head negative for intracranial hemorrhage.  Will order CT C-spine without contrast at this time.  Will order Toradol and lidocaine patch at this time.      Chronic Conditions considered:   Patient Active Problem List   Diagnosis    Impaired fasting glucose    Morbid (severe) obesity due to excess calories    Essential hypertension    Type 2 diabetes mellitus with hyperglycemia         ED RESULTS   Laboratory results:  Labs Reviewed -

## 2024-10-01 NOTE — ED NOTES
PT medicated for pain. VS assessed. Visitor at bedside. Interpretor used. PT denies any further needs at this time.

## 2024-10-01 NOTE — DISCHARGE INSTRUCTIONS
You were seen in the ED today for neck pain and headache.  Headache was likely caused by the tension in your neck.  Workup reassuring in the ED.  Please follow-up with PCP as soon as possible for further medical management.  Can start taking Norflex twice daily for muscular relief.  Take all other medication as prescribed and indicated.  Return to the ED for any new or worsening symptoms, or any additional concerns.

## 2024-10-28 DIAGNOSIS — I10 ESSENTIAL HYPERTENSION: ICD-10-CM

## 2024-10-28 DIAGNOSIS — E11.65 TYPE 2 DIABETES MELLITUS WITH HYPERGLYCEMIA, WITHOUT LONG-TERM CURRENT USE OF INSULIN (HCC): Primary | ICD-10-CM

## 2024-10-28 RX ORDER — LOSARTAN POTASSIUM 25 MG/1
25 TABLET ORAL DAILY
Qty: 90 TABLET | Refills: 0 | Status: SHIPPED | OUTPATIENT
Start: 2024-10-28

## 2024-10-28 RX ORDER — LOSARTAN POTASSIUM 25 MG/1
25 TABLET ORAL DAILY
Qty: 30 TABLET | Refills: 0 | OUTPATIENT
Start: 2024-10-28

## 2024-10-28 RX ORDER — TIRZEPATIDE 5 MG/.5ML
0.5 INJECTION, SOLUTION SUBCUTANEOUS WEEKLY
Qty: 2 ML | Refills: 0 | Status: SHIPPED | OUTPATIENT
Start: 2024-10-28

## 2024-10-28 NOTE — TELEPHONE ENCOUNTER
Patient's last appointment was : 8/29/2024  Patient's next appointment is : Visit date not found  Last refilled:      08/29/2024 Losartan 25 mg, 30 tabs with 1 refill.

## 2024-11-07 ENCOUNTER — TELEPHONE (OUTPATIENT)
Dept: FAMILY MEDICINE CLINIC | Age: 39
End: 2024-11-07

## 2024-11-08 DIAGNOSIS — E11.65 TYPE 2 DIABETES MELLITUS WITH HYPERGLYCEMIA, WITHOUT LONG-TERM CURRENT USE OF INSULIN (HCC): Primary | ICD-10-CM

## 2024-11-08 RX ORDER — LIRAGLUTIDE 6 MG/ML
.6-1.2 INJECTION SUBCUTANEOUS DAILY
Qty: 6 ADJUSTABLE DOSE PRE-FILLED PEN SYRINGE | Refills: 1 | Status: SHIPPED | OUTPATIENT
Start: 2024-11-08 | End: 2024-11-08 | Stop reason: SDUPTHER

## 2024-11-08 RX ORDER — LIRAGLUTIDE 6 MG/ML
.6-1.2 INJECTION SUBCUTANEOUS DAILY
Qty: 6 ADJUSTABLE DOSE PRE-FILLED PEN SYRINGE | Refills: 1 | Status: SHIPPED | OUTPATIENT
Start: 2024-11-08 | End: 2024-11-11 | Stop reason: SDUPTHER

## 2024-11-08 NOTE — TELEPHONE ENCOUNTER
Without medication not being sent into pharmacy will not know what is covered. Pt will need to reach out to insurance company.

## 2024-11-08 NOTE — TELEPHONE ENCOUNTER
Any idea which GLP-1 would be covered for this patient? Can we reach out to the pharmacy?  Thanks. Diabetic with A1C of 10.7 so should be covered.

## 2024-11-11 DIAGNOSIS — E11.65 TYPE 2 DIABETES MELLITUS WITH HYPERGLYCEMIA, WITHOUT LONG-TERM CURRENT USE OF INSULIN (HCC): ICD-10-CM

## 2024-11-11 RX ORDER — LIRAGLUTIDE 6 MG/ML
.6-1.2 INJECTION SUBCUTANEOUS DAILY
Qty: 6 ADJUSTABLE DOSE PRE-FILLED PEN SYRINGE | Refills: 1 | Status: SHIPPED | OUTPATIENT
Start: 2024-11-11 | End: 2024-11-13 | Stop reason: SDUPTHER

## 2024-11-13 ENCOUNTER — TELEPHONE (OUTPATIENT)
Dept: FAMILY MEDICINE CLINIC | Age: 39
End: 2024-11-13

## 2024-11-13 DIAGNOSIS — E11.65 TYPE 2 DIABETES MELLITUS WITH HYPERGLYCEMIA, WITHOUT LONG-TERM CURRENT USE OF INSULIN (HCC): Primary | ICD-10-CM

## 2024-11-13 RX ORDER — LIRAGLUTIDE 6 MG/ML
.6-1.2 INJECTION SUBCUTANEOUS DAILY
Qty: 6 ADJUSTABLE DOSE PRE-FILLED PEN SYRINGE | Refills: 1 | Status: SHIPPED | OUTPATIENT
Start: 2024-11-13 | End: 2024-11-14 | Stop reason: SDUPTHER

## 2024-11-14 ENCOUNTER — OFFICE VISIT (OUTPATIENT)
Dept: FAMILY MEDICINE CLINIC | Age: 39
End: 2024-11-14

## 2024-11-14 VITALS
SYSTOLIC BLOOD PRESSURE: 160 MMHG | HEIGHT: 64 IN | BODY MASS INDEX: 50.02 KG/M2 | WEIGHT: 293 LBS | HEART RATE: 78 BPM | DIASTOLIC BLOOD PRESSURE: 100 MMHG | OXYGEN SATURATION: 98 % | RESPIRATION RATE: 16 BRPM

## 2024-11-14 DIAGNOSIS — I10 ESSENTIAL HYPERTENSION: ICD-10-CM

## 2024-11-14 DIAGNOSIS — E66.813 CLASS 3 SEVERE OBESITY WITH BODY MASS INDEX (BMI) OF 50.0 TO 59.9 IN ADULT, UNSPECIFIED OBESITY TYPE, UNSPECIFIED WHETHER SERIOUS COMORBIDITY PRESENT: ICD-10-CM

## 2024-11-14 DIAGNOSIS — R80.9 MICROALBUMINURIA: ICD-10-CM

## 2024-11-14 DIAGNOSIS — E66.01 CLASS 3 SEVERE OBESITY WITH BODY MASS INDEX (BMI) OF 50.0 TO 59.9 IN ADULT, UNSPECIFIED OBESITY TYPE, UNSPECIFIED WHETHER SERIOUS COMORBIDITY PRESENT: ICD-10-CM

## 2024-11-14 DIAGNOSIS — E11.9 TYPE 2 DIABETES MELLITUS WITHOUT COMPLICATION, WITHOUT LONG-TERM CURRENT USE OF INSULIN (HCC): Primary | ICD-10-CM

## 2024-11-14 LAB
CREAT UR-MCNC: 47.4 MG/DL
CREATININE URINE POCT: 50
HBA1C MFR BLD: 7.3 %
MICROALBUMIN UR-MCNC: < 1.2 MG/DL
MICROALBUMIN/CREAT 24H UR: 10 MG/DL
MICROALBUMIN/CREAT RATIO PNL UR: 25 MG/G (ref 0–30)
MICROALBUMIN/CREAT UR-RTO: ABNORMAL MG/G

## 2024-11-14 RX ORDER — AMLODIPINE BESYLATE 10 MG/1
10 TABLET ORAL DAILY
Qty: 90 TABLET | Refills: 1 | Status: SHIPPED | OUTPATIENT
Start: 2024-11-14

## 2024-11-14 RX ORDER — LOSARTAN POTASSIUM 25 MG/1
50 TABLET ORAL DAILY
Qty: 90 TABLET | Refills: 0 | Status: SHIPPED | OUTPATIENT
Start: 2024-11-14 | End: 2024-11-14 | Stop reason: SDUPTHER

## 2024-11-14 RX ORDER — METFORMIN HYDROCHLORIDE 500 MG/1
1000 TABLET, EXTENDED RELEASE ORAL
Qty: 180 TABLET | Refills: 1 | Status: SHIPPED | OUTPATIENT
Start: 2024-11-14

## 2024-11-14 RX ORDER — LOSARTAN POTASSIUM 25 MG/1
25 TABLET ORAL DAILY
Qty: 90 TABLET | Refills: 0 | Status: CANCELLED | OUTPATIENT
Start: 2024-11-14

## 2024-11-14 RX ORDER — LOSARTAN POTASSIUM 50 MG/1
50 TABLET ORAL DAILY
Qty: 90 TABLET | Refills: 1 | Status: SHIPPED | OUTPATIENT
Start: 2024-11-14

## 2024-11-14 RX ORDER — LIRAGLUTIDE 6 MG/ML
.6-1.2 INJECTION SUBCUTANEOUS DAILY
Qty: 6 ADJUSTABLE DOSE PRE-FILLED PEN SYRINGE | Refills: 1 | Status: SHIPPED | OUTPATIENT
Start: 2024-11-14

## 2024-11-14 ASSESSMENT — ENCOUNTER SYMPTOMS
COUGH: 0
SHORTNESS OF BREATH: 0
ABDOMINAL PAIN: 0

## 2024-11-14 NOTE — PROGRESS NOTES
[facility] PROFESSIONAL [facility]S  [facility]  [address] [address]  Dept: [phone]  Dept Fax: [phone]  Loc: [phone]      Jing Rousseau is a 39 y.o. female who presents todayfor her medical conditions/complaints as noted below.  Jing Rousseau is c/o of Diabetes and Hypertension      :     HPI    Here for follow-up.    Off GLP-1 for 2 weeks with cost. Will resume this.  Has script at pharmacy and is covered by insurance.     Blood pressure is still high. Not checking at home.    Patient Active Problem List   Diagnosis    Impaired fasting glucose    Morbid (severe) obesity due to excess calories    Essential hypertension    Type 2 diabetes mellitus with hyperglycemia      Goals        Exercise 3x per week (30 min per time)           The patient has No Known Allergies.    Medical History  Jing has a past medical history of BRCA1 negative, BRCA2 negative, Gallstone, Hypertension, and Type 2 diabetes mellitus without complication (HCC).    Past SurgicalHistory  The patient  has a past surgical history that includes  section ().    Family History  This patient's family history includes Breast Cancer (age of onset: 53) in her mother; Diabetes in her mother; Kidney Disease in her father.    Social History  Jing  reports that she has never smoked. She has never been exposed to tobacco smoke. She has never used smokeless tobacco. She reports that she does not drink alcohol and does not use drugs.    Medications    Current Outpatient Medications:     Liraglutide (VICTOZA) 18 MG/3ML SOPN SC injection, Inject 0.6-1.2 mg into the skin daily 0.6 mg daily for 1 week then increase to 1.2 mg daily, Disp: 6 Adjustable Dose Pre-filled Pen Syringe, Rfl: 1    amLODIPine (NORVASC) 10 MG tablet, Take 1 tablet by mouth daily, Disp: 90 tablet, Rfl: 1    losartan (COZAAR) 50 MG tablet, Take 1 tablet by mouth daily, Disp: 90 tablet, Rfl: 1

## 2024-12-26 DIAGNOSIS — E11.9 TYPE 2 DIABETES MELLITUS WITHOUT COMPLICATION, WITHOUT LONG-TERM CURRENT USE OF INSULIN (HCC): ICD-10-CM

## 2024-12-26 NOTE — TELEPHONE ENCOUNTER
Jing Rousseau called requesting a refill on the following medications:  Requested Prescriptions     Pending Prescriptions Disp Refills    metFORMIN (GLUCOPHAGE-XR) 500 MG extended release tablet [Pharmacy Med Name: metFORMIN HCl ER Oral Tablet Extended Release 24 Hour 500 MG] 30 tablet 0     Sig: TAKE 1 TABLET BY MOUTH EVERY DAY WITH BREAKFAST       Date of last visit: 11/14/2024  Date of next visit (if applicable):Visit date not found  Date of last refill: 11/14/2024  Pharmacy Name: [facility] - [place], [address]    Last A1c and CMP done 11/14/2024    Thanks,  Altagracia Escobar LPN

## 2024-12-27 RX ORDER — METFORMIN HYDROCHLORIDE 500 MG/1
500 TABLET, EXTENDED RELEASE ORAL
Qty: 90 TABLET | Refills: 0 | Status: SHIPPED | OUTPATIENT
Start: 2024-12-27

## 2025-01-03 ENCOUNTER — TELEPHONE (OUTPATIENT)
Dept: FAMILY MEDICINE CLINIC | Age: 40
End: 2025-01-03

## 2025-01-06 ENCOUNTER — TELEPHONE (OUTPATIENT)
Dept: FAMILY MEDICINE CLINIC | Age: 40
End: 2025-01-06

## 2025-01-06 DIAGNOSIS — E11.9 TYPE 2 DIABETES MELLITUS WITHOUT COMPLICATION, WITHOUT LONG-TERM CURRENT USE OF INSULIN (HCC): Primary | ICD-10-CM

## 2025-01-06 RX ORDER — TIRZEPATIDE 2.5 MG/.5ML
2.5 INJECTION, SOLUTION SUBCUTANEOUS WEEKLY
Qty: 2 ML | Refills: 0 | Status: SHIPPED | OUTPATIENT
Start: 2025-01-06

## 2025-01-07 ENCOUNTER — TELEPHONE (OUTPATIENT)
Dept: FAMILY MEDICINE CLINIC | Age: 40
End: 2025-01-07

## 2025-01-09 NOTE — TELEPHONE ENCOUNTER
Prior authorization approved  Payer: [employer] [place] [facility] Commercial Case ID: [id number]  Note from payer: PA Case: [id number], Status: Approved, Coverage Starts on: 1/8/2025 12:00:00 AM, Coverage Ends on: 1/8/2026 12:00:00 AM.  Approval Details    Authorized from January 8, 2025 to January 8, 2026  Electronic appeal: Not supported  View History

## 2025-01-31 DIAGNOSIS — E11.9 TYPE 2 DIABETES MELLITUS WITHOUT COMPLICATION, WITHOUT LONG-TERM CURRENT USE OF INSULIN (HCC): ICD-10-CM

## 2025-01-31 NOTE — TELEPHONE ENCOUNTER
Patient's last appointment was : 11/14/2024  Patient's next appointment is : 2/6/2025  Last sent in: 1/6/25 0 refills

## 2025-02-03 ENCOUNTER — PATIENT MESSAGE (OUTPATIENT)
Dept: FAMILY MEDICINE CLINIC | Age: 40
End: 2025-02-03

## 2025-02-04 NOTE — TELEPHONE ENCOUNTER
Refilled at next dose. Of 5 mg. Let me know if side effects and wants to continue 2.5 mg for some reason. Thanks.

## 2025-02-06 DIAGNOSIS — I10 ESSENTIAL HYPERTENSION: ICD-10-CM

## 2025-02-06 RX ORDER — LOSARTAN POTASSIUM 25 MG/1
25 TABLET ORAL DAILY
Qty: 90 TABLET | Refills: 0 | OUTPATIENT
Start: 2025-02-06

## 2025-02-08 DIAGNOSIS — I10 ESSENTIAL HYPERTENSION: ICD-10-CM

## 2025-02-10 RX ORDER — LOSARTAN POTASSIUM 25 MG/1
25 TABLET ORAL DAILY
Qty: 90 TABLET | Refills: 0 | Status: SHIPPED | OUTPATIENT
Start: 2025-02-10

## 2025-02-10 NOTE — TELEPHONE ENCOUNTER
Can we confirm dose? Is patient on both 25 and 50 mg?  Thanks.  Once dose confirmed will re-send rx if needed.

## 2025-02-10 NOTE — TELEPHONE ENCOUNTER
Patient's last appointment was : 11/14/2024  Patient's next appointment is : 2/20/2025  Last refilled: 11/14/24- wants Rx sent to different pharmacy

## 2025-02-20 ENCOUNTER — OFFICE VISIT (OUTPATIENT)
Dept: FAMILY MEDICINE CLINIC | Age: 40
End: 2025-02-20

## 2025-02-20 VITALS
DIASTOLIC BLOOD PRESSURE: 84 MMHG | HEART RATE: 93 BPM | RESPIRATION RATE: 16 BRPM | WEIGHT: 289 LBS | OXYGEN SATURATION: 98 % | SYSTOLIC BLOOD PRESSURE: 136 MMHG | BODY MASS INDEX: 49.61 KG/M2

## 2025-02-20 DIAGNOSIS — I10 ESSENTIAL HYPERTENSION: ICD-10-CM

## 2025-02-20 DIAGNOSIS — Z12.31 ENCOUNTER FOR SCREENING MAMMOGRAM FOR BREAST CANCER: ICD-10-CM

## 2025-02-20 DIAGNOSIS — Z00.00 WELL WOMAN EXAM (NO GYNECOLOGICAL EXAM): ICD-10-CM

## 2025-02-20 DIAGNOSIS — E11.9 TYPE 2 DIABETES MELLITUS WITHOUT COMPLICATION, WITHOUT LONG-TERM CURRENT USE OF INSULIN (HCC): Primary | ICD-10-CM

## 2025-02-20 DIAGNOSIS — E66.01 CLASS 3 SEVERE OBESITY WITH BODY MASS INDEX (BMI) OF 50.0 TO 59.9 IN ADULT, UNSPECIFIED OBESITY TYPE, UNSPECIFIED WHETHER SERIOUS COMORBIDITY PRESENT: ICD-10-CM

## 2025-02-20 DIAGNOSIS — R80.9 MICROALBUMINURIA: ICD-10-CM

## 2025-02-20 DIAGNOSIS — E66.813 CLASS 3 SEVERE OBESITY WITH BODY MASS INDEX (BMI) OF 50.0 TO 59.9 IN ADULT, UNSPECIFIED OBESITY TYPE, UNSPECIFIED WHETHER SERIOUS COMORBIDITY PRESENT: ICD-10-CM

## 2025-02-20 LAB — HBA1C MFR BLD: 6.4 %

## 2025-02-20 RX ORDER — AMLODIPINE BESYLATE 10 MG/1
10 TABLET ORAL DAILY
Qty: 90 TABLET | Refills: 1 | Status: SHIPPED | OUTPATIENT
Start: 2025-02-20

## 2025-02-20 RX ORDER — METFORMIN HYDROCHLORIDE 500 MG/1
500 TABLET, EXTENDED RELEASE ORAL
Qty: 90 TABLET | Refills: 0 | Status: SHIPPED | OUTPATIENT
Start: 2025-02-20

## 2025-02-20 SDOH — ECONOMIC STABILITY: FOOD INSECURITY: WITHIN THE PAST 12 MONTHS, THE FOOD YOU BOUGHT JUST DIDN'T LAST AND YOU DIDN'T HAVE MONEY TO GET MORE.: NEVER TRUE

## 2025-02-20 SDOH — ECONOMIC STABILITY: FOOD INSECURITY: WITHIN THE PAST 12 MONTHS, YOU WORRIED THAT YOUR FOOD WOULD RUN OUT BEFORE YOU GOT MONEY TO BUY MORE.: NEVER TRUE

## 2025-02-20 ASSESSMENT — PATIENT HEALTH QUESTIONNAIRE - PHQ9
2. FEELING DOWN, DEPRESSED OR HOPELESS: NOT AT ALL
SUM OF ALL RESPONSES TO PHQ QUESTIONS 1-9: 0
SUM OF ALL RESPONSES TO PHQ9 QUESTIONS 1 & 2: 0
1. LITTLE INTEREST OR PLEASURE IN DOING THINGS: NOT AT ALL
SUM OF ALL RESPONSES TO PHQ QUESTIONS 1-9: 0

## 2025-02-20 ASSESSMENT — ENCOUNTER SYMPTOMS: CONSTIPATION: 1

## 2025-02-20 NOTE — PROGRESS NOTES
[facility] PROFESSIONAL [facility]S  [facility]  [address] [address]  Dept: [phone]  Dept Fax: [phone]  Loc: [phone]      Jing Rousseau is a 39 y.o. female who presents todayfor her medical conditions/complaints as noted below.  Jing Rousseau is c/o of Diabetes (Follow up ), Hypertension (Follow up ), Constipation (X 3 weeks- off and on - ), and Discuss Medications (How much losartan should she be taking )      :     Here for follow-up.  Doing well.    Menses are more regular with injection.    Feeling well.  Some weight loss.    Taking magnesium for constipation but this helps. Had belly pain once that resolved with stooling.  Feeling well otherwise. Drinks water.    Patient Active Problem List   Diagnosis    Impaired fasting glucose    Morbid (severe) obesity due to excess calories    Essential hypertension    Type 2 diabetes mellitus with hyperglycemia      Goals        Exercise 3x per week (30 min per time)           The patient has No Known Allergies.    Medical History  Jing has a past medical history of BRCA1 negative, BRCA2 negative, Gallstone, Hypertension, and Type 2 diabetes mellitus without complication (HCC).    Past SurgicalHistory  The patient  has a past surgical history that includes  section ().    Family History  This patient's family history includes Breast Cancer (age of onset: 53) in her mother; Diabetes in her mother; Kidney Disease in her father.    Social History  Jing  reports that she has never smoked. She has never been exposed to tobacco smoke. She has never used smokeless tobacco. She reports that she does not drink alcohol and does not use drugs.    Medications    Current Outpatient Medications:     ASHWAGANDHA PO, Take by mouth daily, Disp: , Rfl:     Tirzepatide 7.5 MG/0.5ML SOAJ, Inject 7.5 mg into the skin every 7 days, Disp: 2 mL, Rfl: 0    Tirzepatide 10 MG/0.5ML SOAJ, Inject 10 mg

## 2025-02-25 DIAGNOSIS — Z20.828 EXPOSURE TO INFLUENZA: Primary | ICD-10-CM

## 2025-02-25 RX ORDER — OSELTAMIVIR PHOSPHATE 75 MG/1
75 CAPSULE ORAL DAILY
Qty: 10 CAPSULE | Refills: 0 | Status: SHIPPED | OUTPATIENT
Start: 2025-02-25 | End: 2025-03-07

## 2025-02-25 NOTE — PROGRESS NOTES
Patient's son tested positive for influenza A in the office on 2/25/2025.  Prophylactic Tamiflu sent to pharmacy for patient to  and start taking per patient request.    April Law, DO

## 2025-04-24 DIAGNOSIS — E11.9 TYPE 2 DIABETES MELLITUS WITHOUT COMPLICATION, WITHOUT LONG-TERM CURRENT USE OF INSULIN (HCC): Primary | ICD-10-CM

## 2025-04-24 RX ORDER — TIRZEPATIDE 15 MG/.5ML
15 INJECTION, SOLUTION SUBCUTANEOUS WEEKLY
Qty: 6 ML | Refills: 1 | Status: SHIPPED | OUTPATIENT
Start: 2025-04-24

## 2025-05-16 ENCOUNTER — HOSPITAL ENCOUNTER (OUTPATIENT)
Dept: WOMENS IMAGING | Age: 40
Discharge: HOME | End: 2025-05-16
Attending: FAMILY MEDICINE
Payer: COMMERCIAL

## 2025-05-16 VITALS — HEIGHT: 64 IN | WEIGHT: 289 LBS | BODY MASS INDEX: 49.34 KG/M2

## 2025-05-16 DIAGNOSIS — Z12.31 ENCOUNTER FOR SCREENING MAMMOGRAM FOR BREAST CANCER: ICD-10-CM

## 2025-05-16 PROCEDURE — 77063 BREAST TOMOSYNTHESIS BI: CPT

## 2025-05-19 ENCOUNTER — RESULTS FOLLOW-UP (OUTPATIENT)
Dept: FAMILY MEDICINE CLINIC | Age: 40
End: 2025-05-19

## 2025-05-19 NOTE — RESULT ENCOUNTER NOTE
Normal mammogram. Let me know if you want me to order breast MRI and automated breast ultrasound for additional security about absence of breast cancer given family history and dense breasts.

## 2025-06-02 ENCOUNTER — OFFICE VISIT (OUTPATIENT)
Dept: FAMILY MEDICINE CLINIC | Age: 40
End: 2025-06-02

## 2025-06-02 VITALS
OXYGEN SATURATION: 98 % | SYSTOLIC BLOOD PRESSURE: 136 MMHG | WEIGHT: 273 LBS | BODY MASS INDEX: 46.86 KG/M2 | HEART RATE: 83 BPM | DIASTOLIC BLOOD PRESSURE: 80 MMHG | RESPIRATION RATE: 16 BRPM

## 2025-06-02 DIAGNOSIS — T14.8XXA MUSCLE STRAIN: ICD-10-CM

## 2025-06-02 DIAGNOSIS — R80.9 MICROALBUMINURIA: ICD-10-CM

## 2025-06-02 DIAGNOSIS — R42 DIZZINESS: ICD-10-CM

## 2025-06-02 DIAGNOSIS — F41.9 ANXIETY: ICD-10-CM

## 2025-06-02 DIAGNOSIS — I10 ESSENTIAL HYPERTENSION: ICD-10-CM

## 2025-06-02 DIAGNOSIS — E11.69 TYPE 2 DIABETES MELLITUS WITH OTHER SPECIFIED COMPLICATION, WITHOUT LONG-TERM CURRENT USE OF INSULIN (HCC): ICD-10-CM

## 2025-06-02 DIAGNOSIS — E66.813 CLASS 3 SEVERE OBESITY WITH BODY MASS INDEX (BMI) OF 50.0 TO 59.9 IN ADULT, UNSPECIFIED OBESITY TYPE, UNSPECIFIED WHETHER SERIOUS COMORBIDITY PRESENT (HCC): ICD-10-CM

## 2025-06-02 DIAGNOSIS — Z23 NEED FOR HPV VACCINATION: ICD-10-CM

## 2025-06-02 LAB
ALBUMIN SERPL BCG-MCNC: 4.2 G/DL (ref 3.4–4.9)
ALP SERPL-CCNC: 74 U/L (ref 38–126)
ALT SERPL W/O P-5'-P-CCNC: 31 U/L (ref 10–35)
ANION GAP SERPL CALC-SCNC: 14 MEQ/L (ref 8–16)
AST SERPL-CCNC: 31 U/L (ref 10–35)
BASOPHILS ABSOLUTE: 0.1 THOU/MM3 (ref 0–0.1)
BASOPHILS NFR BLD AUTO: 0.6 %
BILIRUB SERPL-MCNC: 0.3 MG/DL (ref 0.3–1.2)
BUN SERPL-MCNC: 13 MG/DL (ref 8–23)
CALCIUM SERPL-MCNC: 9.5 MG/DL (ref 8.6–10)
CHLORIDE SERPL-SCNC: 99 MEQ/L (ref 98–111)
CHOLEST SERPL-MCNC: 183 MG/DL (ref 100–199)
CO2 SERPL-SCNC: 25 MEQ/L (ref 22–29)
CREAT SERPL-MCNC: 0.6 MG/DL (ref 0.5–0.9)
DEPRECATED RDW RBC AUTO: 44.1 FL (ref 35–45)
EOSINOPHIL NFR BLD AUTO: 1.5 %
EOSINOPHILS ABSOLUTE: 0.2 THOU/MM3 (ref 0–0.4)
ERYTHROCYTE [DISTWIDTH] IN BLOOD BY AUTOMATED COUNT: 13.7 % (ref 11.5–14.5)
GFR SERPL CREATININE-BSD FRML MDRD: > 90 ML/MIN/1.73M2
GLUCOSE SERPL-MCNC: 90 MG/DL (ref 74–109)
HBA1C MFR BLD: 5.4 %
HCT VFR BLD AUTO: 44.5 % (ref 37–47)
HDLC SERPL-MCNC: 42 MG/DL
HGB BLD-MCNC: 14.4 GM/DL (ref 12–16)
IMM GRANULOCYTES # BLD AUTO: 0.03 THOU/MM3 (ref 0–0.07)
IMM GRANULOCYTES NFR BLD AUTO: 0.3 %
LDLC SERPL CALC-MCNC: 109 MG/DL
LYMPHOCYTES ABSOLUTE: 3.3 THOU/MM3 (ref 1–4.8)
LYMPHOCYTES NFR BLD AUTO: 29.6 %
MCH RBC QN AUTO: 28.9 PG (ref 26–33)
MCHC RBC AUTO-ENTMCNC: 32.4 GM/DL (ref 32.2–35.5)
MCV RBC AUTO: 89.2 FL (ref 81–99)
MONOCYTES ABSOLUTE: 0.5 THOU/MM3 (ref 0.4–1.3)
MONOCYTES NFR BLD AUTO: 4.4 %
NEUTROPHILS ABSOLUTE: 7 THOU/MM3 (ref 1.8–7.7)
NEUTROPHILS NFR BLD AUTO: 63.6 %
NRBC BLD AUTO-RTO: 0 /100 WBC
PLATELET # BLD AUTO: 384 THOU/MM3 (ref 130–400)
PMV BLD AUTO: 10.4 FL (ref 9.4–12.4)
POTASSIUM SERPL-SCNC: 3.9 MEQ/L (ref 3.5–5.2)
PROT SERPL-MCNC: 8 G/DL (ref 6.4–8.3)
RBC # BLD AUTO: 4.99 MILL/MM3 (ref 4.2–5.4)
SODIUM SERPL-SCNC: 138 MEQ/L (ref 135–145)
TRIGL SERPL-MCNC: 159 MG/DL (ref 0–199)
TSH SERPL DL<=0.05 MIU/L-ACNC: 2.51 UIU/ML (ref 0.27–4.2)
WBC # BLD AUTO: 11 THOU/MM3 (ref 4.8–10.8)

## 2025-06-02 RX ORDER — LOSARTAN POTASSIUM 25 MG/1
25 TABLET ORAL DAILY
Qty: 90 TABLET | Refills: 1 | Status: SHIPPED | OUTPATIENT
Start: 2025-06-02

## 2025-06-02 RX ORDER — METFORMIN HYDROCHLORIDE 500 MG/1
500 TABLET, EXTENDED RELEASE ORAL
Qty: 90 TABLET | Refills: 1 | Status: SHIPPED | OUTPATIENT
Start: 2025-06-02

## 2025-06-02 RX ORDER — MECLIZINE HYDROCHLORIDE 25 MG/1
25 TABLET ORAL 3 TIMES DAILY PRN
Qty: 15 TABLET | Refills: 0 | Status: SHIPPED | OUTPATIENT
Start: 2025-06-02 | End: 2025-06-12

## 2025-06-02 ASSESSMENT — ENCOUNTER SYMPTOMS
NAUSEA: 0
CONSTIPATION: 1
SHORTNESS OF BREATH: 0
COUGH: 0
EYE REDNESS: 0
ABDOMINAL PAIN: 0
VOMITING: 0
DIARRHEA: 0
EYE DISCHARGE: 0
BACK PAIN: 1
SORE THROAT: 0

## 2025-06-02 NOTE — PROGRESS NOTES
[facility]  [facility]  [address] [address]  Dept: [phone]  Dept Fax: [phone]  Loc: [phone]      Jing Rousseau is a 39 y.o. female who presents todayfor her medical conditions/complaints as noted below.  Jing Rousseau is c/o of 3 Month Follow-Up (DM, HTN )      :     HPI    Generally doing well.    Losing weight.  Down 34 pounds.     Am fasting sugars are all below 100.  After eating goes up to 110-115 if watches carbs.    Sometimes hands get sweaty when goes to 80's.      Mild strain in right upper back from clipping plants in garden.    Sometimes anxious when there are a lot of people.  Wants to leave those places.  Feels desperate and afraid to be around a lot of cars.  Not interested in medication change.  Thinks ashwagandha helping.  Not wanting medication changes at this time.      Wt Readings from Last 50 Encounters:   06/02/25 123.8 kg (273 lb)   05/16/25 131.1 kg (289 lb)   02/20/25 131.1 kg (289 lb)   11/14/24 132.9 kg (293 lb)   09/30/24 133.4 kg (294 lb)   08/29/24 (!) 137 kg (302 lb)   08/20/24 (!) 137.2 kg (302 lb 6.4 oz)   03/11/24 (!) 138.8 kg (306 lb)   01/04/24 (!) 139.7 kg (308 lb)   06/19/23 (!) 140.3 kg (309 lb 3.2 oz)   02/16/23 (!) 139 kg (306 lb 6.4 oz)   02/08/23 (!) 139.3 kg (307 lb)   11/17/22 (!) 138.3 kg (305 lb)   08/01/22 (!) 138.8 kg (306 lb)   04/18/22 (!) 138.4 kg (305 lb 3.2 oz)   04/11/22 (!) 137.4 kg (303 lb)   02/07/22 134.3 kg (296 lb)   01/04/22 136.1 kg (300 lb)   12/13/21 134.9 kg (297 lb 6.4 oz)   12/06/21 133.8 kg (295 lb)   11/24/21 122.4 kg (269 lb 12.8 oz)   10/25/21 (!) 137.3 kg (302 lb 12.8 oz)   07/26/21 132.7 kg (292 lb 9.6 oz)   07/19/21 133.7 kg (294 lb 12.8 oz)   05/18/21 133.6 kg (294 lb 9.6 oz)   04/26/21 132.9 kg (293 lb)   03/04/21 135.9 kg (299 lb 9.6 oz)   12/04/20 (!) 136.8 kg (301 lb 9.6 oz)   07/16/20 134.2 kg (295 lb 12.8 oz)   02/12/20 129.7 kg

## 2025-06-02 NOTE — PROGRESS NOTES
After obtaining consent, and per orders of Dr. Beltre, injection of Gardasil-9 given in Left deltoid by Jennifer Harris MA. Patient instructed to remain in clinic for 20 minutes afterwards, and to report any adverse reaction to me immediately.    Immunizations Administered       Name Date Dose Route    HPV, GARDASIL 9, (age 9y-45y), IM, 0.5mL 6/2/2025 0.5 mL Intramuscular    Site: Deltoid- Left    Lot: [id number]    NDC: 8649-4440-57          Pt tolerated injection well. VIS given to pt, vaccine checklist completed.

## 2025-06-05 ENCOUNTER — RESULTS FOLLOW-UP (OUTPATIENT)
Dept: FAMILY MEDICINE CLINIC | Age: 40
End: 2025-06-05

## 2025-06-16 ENCOUNTER — OFFICE VISIT (OUTPATIENT)
Dept: FAMILY MEDICINE CLINIC | Age: 40
End: 2025-06-16
Payer: COMMERCIAL

## 2025-06-16 VITALS
RESPIRATION RATE: 18 BRPM | BODY MASS INDEX: 46.35 KG/M2 | DIASTOLIC BLOOD PRESSURE: 88 MMHG | HEART RATE: 98 BPM | OXYGEN SATURATION: 99 % | SYSTOLIC BLOOD PRESSURE: 140 MMHG | WEIGHT: 270 LBS

## 2025-06-16 DIAGNOSIS — M54.9 MID BACK PAIN ON RIGHT SIDE: Primary | ICD-10-CM

## 2025-06-16 DIAGNOSIS — M62.838 MUSCLE SPASM: ICD-10-CM

## 2025-06-16 LAB
BILIRUBIN, POC: NEGATIVE
BLOOD URINE, POC: NORMAL
CLARITY, POC: CLEAR
COLOR, POC: YELLOW
GLUCOSE URINE, POC: NEGATIVE MG/DL
KETONES, POC: NEGATIVE MG/DL
LEUKOCYTE EST, POC: NEGATIVE
NITRITE, POC: NEGATIVE
PH, POC: 6
PROTEIN, POC: NEGATIVE MG/DL
SPECIFIC GRAVITY, POC: 1.02
UROBILINOGEN, POC: 0.2 MG/DL

## 2025-06-16 PROCEDURE — 3077F SYST BP >= 140 MM HG: CPT | Performed by: STUDENT IN AN ORGANIZED HEALTH CARE EDUCATION/TRAINING PROGRAM

## 2025-06-16 PROCEDURE — 3079F DIAST BP 80-89 MM HG: CPT | Performed by: STUDENT IN AN ORGANIZED HEALTH CARE EDUCATION/TRAINING PROGRAM

## 2025-06-16 PROCEDURE — 81003 URINALYSIS AUTO W/O SCOPE: CPT | Performed by: STUDENT IN AN ORGANIZED HEALTH CARE EDUCATION/TRAINING PROGRAM

## 2025-06-16 PROCEDURE — 99213 OFFICE O/P EST LOW 20 MIN: CPT | Performed by: STUDENT IN AN ORGANIZED HEALTH CARE EDUCATION/TRAINING PROGRAM

## 2025-06-16 ASSESSMENT — ENCOUNTER SYMPTOMS
DIARRHEA: 0
ABDOMINAL PAIN: 0
CONSTIPATION: 1
NAUSEA: 0
VOMITING: 0
BACK PAIN: 1

## 2025-06-16 NOTE — PROGRESS NOTES
Musculoskeletal:  Positive for back pain. Negative for arthralgias, gait problem and neck pain.   Neurological:  Negative for weakness and numbness.     Objective:     Physical Exam  Vitals and nursing note reviewed.   Constitutional:       General: She is not in acute distress.     Appearance: Normal appearance. She is well-developed. She is obese. She is not ill-appearing.   HENT:      Head: Normocephalic and atraumatic.      Mouth/Throat:      Lips: Pink.      Mouth: Mucous membranes are moist.   Eyes:      General: Lids are normal.      Conjunctiva/sclera: Conjunctivae normal.   Cardiovascular:      Rate and Rhythm: Normal rate and regular rhythm.      Heart sounds: Normal heart sounds. No murmur heard.  Pulmonary:      Effort: Pulmonary effort is normal.      Breath sounds: Normal breath sounds and air entry. No wheezing, rhonchi or rales.   Musculoskeletal:      Cervical back: Normal. No tenderness or bony tenderness. No spinous process tenderness or muscular tenderness.      Thoracic back: Spasms and tenderness (muscular on right lower thoracolumbar region) present. No deformity or bony tenderness.      Lumbar back: Normal. No tenderness or bony tenderness.   Skin:     General: Skin is warm and dry.   Neurological:      General: No focal deficit present.      Mental Status: She is alert and oriented to person, place, and time.      Gait: Gait is intact.   Psychiatric:         Mood and Affect: Mood normal.         Behavior: Behavior is cooperative.       BP (!) 140/88   Pulse 98   Resp 18   Wt 122.5 kg (270 lb)   LMP  (LMP Unknown)   SpO2 99%   BMI 46.35 kg/m²     Assessment/Plan:   Jing was seen today for back pain.    Diagnoses and all orders for this visit:    Mid back pain on right side  -     POCT Urinalysis No Micro (Auto)    Muscle spasm    39-year-old female presented to the office for concerns of mid right back pain x 3 days; no known injury or trauma, though patient was seated in a twisted